# Patient Record
Sex: FEMALE | Race: WHITE | NOT HISPANIC OR LATINO | Employment: OTHER | URBAN - METROPOLITAN AREA
[De-identification: names, ages, dates, MRNs, and addresses within clinical notes are randomized per-mention and may not be internally consistent; named-entity substitution may affect disease eponyms.]

---

## 2023-03-08 ENCOUNTER — TELEPHONE (OUTPATIENT)
Dept: PRIMARY CARE | Facility: CLINIC | Age: 88
End: 2023-03-08
Payer: MEDICARE

## 2023-03-08 NOTE — TELEPHONE ENCOUNTER
Pt seen in urgent care, given Z-Rupert. Had reaction, covered in hives. Asking for you to send in substitute.     Pharmacy: [facility]

## 2023-03-09 DIAGNOSIS — T78.40XA ALLERGIC REACTION, INITIAL ENCOUNTER: Primary | ICD-10-CM

## 2023-03-09 RX ORDER — TRIAMCINOLONE ACETONIDE 1 MG/G
CREAM TOPICAL 2 TIMES DAILY
Qty: 15 G | Refills: 0 | Status: SHIPPED | OUTPATIENT
Start: 2023-03-09 | End: 2023-10-06 | Stop reason: ALTCHOICE

## 2023-03-09 RX ORDER — PREDNISONE 20 MG/1
TABLET ORAL
Qty: 18 TABLET | Refills: 0 | Status: SHIPPED | OUTPATIENT
Start: 2023-03-09 | End: 2023-03-18

## 2023-03-09 NOTE — TELEPHONE ENCOUNTER
Pt had allergic reaction, it's gotten much worse. Rash moved to head, ears, all over. Benadryl is not helping. Asking for steroid and cream.

## 2023-03-25 DIAGNOSIS — K21.9 GASTROESOPHAGEAL REFLUX DISEASE WITHOUT ESOPHAGITIS: Primary | ICD-10-CM

## 2023-03-27 RX ORDER — OMEPRAZOLE 20 MG/1
CAPSULE, DELAYED RELEASE ORAL
Qty: 90 CAPSULE | Refills: 0 | Status: SHIPPED | OUTPATIENT
Start: 2023-03-27 | End: 2023-06-27

## 2023-04-07 ENCOUNTER — OFFICE VISIT (OUTPATIENT)
Dept: PRIMARY CARE | Facility: CLINIC | Age: 88
End: 2023-04-07
Payer: MEDICARE

## 2023-04-07 VITALS
BODY MASS INDEX: 22.66 KG/M2 | SYSTOLIC BLOOD PRESSURE: 112 MMHG | HEART RATE: 51 BPM | HEIGHT: 65 IN | WEIGHT: 136 LBS | RESPIRATION RATE: 17 BRPM | TEMPERATURE: 97.3 F | DIASTOLIC BLOOD PRESSURE: 70 MMHG | OXYGEN SATURATION: 99 %

## 2023-04-07 DIAGNOSIS — Z00.00 MEDICARE ANNUAL WELLNESS VISIT, SUBSEQUENT: ICD-10-CM

## 2023-04-07 DIAGNOSIS — R19.7 DIARRHEA OF PRESUMED INFECTIOUS ORIGIN: Primary | ICD-10-CM

## 2023-04-07 PROBLEM — I10 BENIGN ESSENTIAL HYPERTENSION: Status: ACTIVE | Noted: 2023-04-07

## 2023-04-07 PROBLEM — R41.3 MEMORY LOSS: Status: ACTIVE | Noted: 2023-04-07

## 2023-04-07 PROBLEM — F41.1 ANXIETY STATE: Status: ACTIVE | Noted: 2023-04-07

## 2023-04-07 PROBLEM — N39.41 SENSORY URGE INCONTINENCE: Status: ACTIVE | Noted: 2023-04-07

## 2023-04-07 PROBLEM — E78.5 HYPERLIPIDEMIA, UNSPECIFIED: Status: ACTIVE | Noted: 2023-04-07

## 2023-04-07 PROBLEM — K21.9 GASTROESOPHAGEAL REFLUX DISEASE WITHOUT ESOPHAGITIS: Status: ACTIVE | Noted: 2023-04-07

## 2023-04-07 PROBLEM — E03.9 HYPOTHYROIDISM: Status: ACTIVE | Noted: 2023-04-07

## 2023-04-07 PROBLEM — M81.0 OSTEOPOROSIS: Status: ACTIVE | Noted: 2023-04-07

## 2023-04-07 PROCEDURE — 1170F FXNL STATUS ASSESSED: CPT | Performed by: FAMILY MEDICINE

## 2023-04-07 PROCEDURE — 99213 OFFICE O/P EST LOW 20 MIN: CPT | Performed by: FAMILY MEDICINE

## 2023-04-07 PROCEDURE — 3078F DIAST BP <80 MM HG: CPT | Performed by: FAMILY MEDICINE

## 2023-04-07 PROCEDURE — 1160F RVW MEDS BY RX/DR IN RCRD: CPT | Performed by: FAMILY MEDICINE

## 2023-04-07 PROCEDURE — 1036F TOBACCO NON-USER: CPT | Performed by: FAMILY MEDICINE

## 2023-04-07 PROCEDURE — 1159F MED LIST DOCD IN RCRD: CPT | Performed by: FAMILY MEDICINE

## 2023-04-07 PROCEDURE — G0439 PPPS, SUBSEQ VISIT: HCPCS | Performed by: FAMILY MEDICINE

## 2023-04-07 PROCEDURE — 3074F SYST BP LT 130 MM HG: CPT | Performed by: FAMILY MEDICINE

## 2023-04-07 RX ORDER — LEVOTHYROXINE SODIUM 75 UG/1
TABLET ORAL
COMMUNITY
Start: 2020-10-26 | End: 2023-04-17

## 2023-04-07 RX ORDER — LORAZEPAM 0.5 MG/1
0.5 TABLET ORAL EVERY 6 HOURS PRN
COMMUNITY
End: 2023-10-06 | Stop reason: ALTCHOICE

## 2023-04-07 RX ORDER — BIOTIN 1 MG
1 TABLET ORAL DAILY
COMMUNITY
End: 2024-04-08 | Stop reason: WASHOUT

## 2023-04-07 RX ORDER — ACETAMINOPHEN 500 MG
TABLET ORAL
COMMUNITY

## 2023-04-07 RX ORDER — DOCUSATE SODIUM 100 MG/1
1 CAPSULE, LIQUID FILLED ORAL NIGHTLY
COMMUNITY
Start: 2021-06-08

## 2023-04-07 RX ORDER — DONEPEZIL HYDROCHLORIDE 10 MG/1
TABLET, FILM COATED ORAL
COMMUNITY
End: 2023-06-07

## 2023-04-07 RX ORDER — ACETAMINOPHEN 500 MG
TABLET ORAL
COMMUNITY
Start: 2022-05-03

## 2023-04-07 RX ORDER — AMLODIPINE BESYLATE 5 MG/1
TABLET ORAL
COMMUNITY
Start: 2016-06-20 | End: 2023-07-28

## 2023-04-07 RX ORDER — SIMVASTATIN 20 MG/1
TABLET, FILM COATED ORAL
COMMUNITY
Start: 2021-05-10 | End: 2023-12-15 | Stop reason: SDUPTHER

## 2023-04-07 RX ORDER — CITALOPRAM 40 MG/1
1 TABLET, FILM COATED ORAL DAILY
COMMUNITY
Start: 2017-08-21 | End: 2023-06-07

## 2023-04-07 RX ORDER — ACETAMINOPHEN 500 MG
1 TABLET ORAL DAILY
COMMUNITY

## 2023-04-07 RX ORDER — METOPROLOL SUCCINATE 50 MG/1
TABLET, EXTENDED RELEASE ORAL
COMMUNITY
Start: 2020-10-26 | End: 2023-05-23

## 2023-04-07 RX ORDER — ASPIRIN 81 MG/1
TABLET ORAL
COMMUNITY

## 2023-04-07 ASSESSMENT — ACTIVITIES OF DAILY LIVING (ADL)
TAKING_MEDICATION: NEEDS ASSISTANCE
DRESSING: INDEPENDENT
GROCERY_SHOPPING: INDEPENDENT
MANAGING_FINANCES: NEEDS ASSISTANCE
DOING_HOUSEWORK: INDEPENDENT
BATHING: INDEPENDENT

## 2023-04-07 ASSESSMENT — ENCOUNTER SYMPTOMS
LOSS OF SENSATION IN FEET: 0
CONSTIPATION: 1
DIARRHEA: 1
CONFUSION: 1
OCCASIONAL FEELINGS OF UNSTEADINESS: 0
DEPRESSION: 0
ROS GI COMMENTS: INCREASED BELCHING

## 2023-04-07 ASSESSMENT — PATIENT HEALTH QUESTIONNAIRE - PHQ9
1. LITTLE INTEREST OR PLEASURE IN DOING THINGS: NOT AT ALL
2. FEELING DOWN, DEPRESSED OR HOPELESS: NOT AT ALL
SUM OF ALL RESPONSES TO PHQ9 QUESTIONS 1 AND 2: 0

## 2023-04-07 NOTE — PROGRESS NOTES
"87 year old female for follow up.     Reason for Visit: Gabrielle Turner is an 87 y.o. female here for a Medicare Wellness visit.     Reviewed all medications by prescribing practitioner or clinical pharmacist (such as prescriptions, OTCs, herbal therapies and supplements) and documented in the medical record.    Diarrhea        Subjective : she is having stomach issues. She has been living with Lashawn, and she has sporadic diarrhea. If is very explosive, and mostly when eating out. They were when she had sandwiches, ham and cheese sergey and monte s[employer]. Salads, Bag lettuce. Constipated for the most part. Black stools, fills the toilet. Not really cramping, no nausea, vomiting. Has tried pepto a few times. Burping a lot more.     Patient Care Team:  Allyn Solis MD as PCP - General  Allyn Solis MD as PCP - [employer] PCP     Review of Systems   Gastrointestinal:  Positive for constipation and diarrhea.        Increased belching   Psychiatric/Behavioral:  Positive for confusion.        Objective   Vitals:  /70   Pulse 51   Temp 36.3 °C (97.3 °F)   Resp 17   Ht 1.651 m (5' 5\")   Wt 61.7 kg (136 lb)   SpO2 99%   BMI 22.63 kg/m²       Physical Exam  Vitals reviewed.   Constitutional:       Appearance: Normal appearance.   HENT:      Head: Normocephalic and atraumatic.      Right Ear: Tympanic membrane normal.      Left Ear: Tympanic membrane normal.      Nose: Nose normal.      Mouth/Throat:      Mouth: Mucous membranes are moist.      Pharynx: Oropharynx is clear.   Eyes:      General: Scleral icterus: stool.      Extraocular Movements: Extraocular movements intact.      Conjunctiva/sclera: Conjunctivae normal.      Pupils: Pupils are equal, round, and reactive to light.   Cardiovascular:      Rate and Rhythm: Normal rate and regular rhythm.      Pulses: Normal pulses.      Heart sounds: Normal heart sounds.   Pulmonary:      Effort: Pulmonary effort is normal.      Breath " sounds: Normal breath sounds.   Abdominal:      General: Abdomen is flat. Bowel sounds are normal.      Palpations: Abdomen is soft.   Musculoskeletal:         General: Normal range of motion.      Cervical back: Normal range of motion and neck supple.   Skin:     General: Skin is warm and dry.      Capillary Refill: Capillary refill takes less than 2 seconds.   Neurological:      General: No focal deficit present.      Mental Status: She is alert and oriented to person, place, and time.   Psychiatric:         Mood and Affect: Mood normal.         Behavior: Behavior normal.         Assessment/Plan   Problem List Items Addressed This Visit       Medicare annual wellness visit, subsequent    Diarrhea of presumed infectious origin - Primary    Relevant Orders    Stool Pathogen Panel, PCR    Calprotectin Stool    Giardia / Cryptosporidium antigens, DFA    C. difficile, PCR    Fecal Occult Blood Immunoassy    Lactoferrin, Fecal, Quantitative     Plan to increase fiber, Track diarrhea, and get stool cultures and check for blood. If cultures negative, would think about checking gall bladder or considering irritable bowel.

## 2023-04-13 ENCOUNTER — LAB (OUTPATIENT)
Dept: LAB | Facility: LAB | Age: 88
End: 2023-04-13
Payer: MEDICARE

## 2023-04-13 DIAGNOSIS — R19.7 DIARRHEA OF PRESUMED INFECTIOUS ORIGIN: ICD-10-CM

## 2023-04-13 LAB
C. DIFFICILE TOXIN, PCR: NORMAL
CALPROTECTIN, STOOL: NORMAL
CAMPYLOBACTER GP: NORMAL
CLOSTRIDIUM DIFFICILE NAP 1 STRAIN (PRESUMPTIVE): NORMAL
CRYPTOSPORIDIUM ANTIGEN-DATA CONVERSION: NORMAL
GIARDIA LAMBLIA AG-DATA CONVERSION: NORMAL
LACTOFERRIN, FECAL, QUANT.: POSITIVE
NOROVIRUS GI/GII: NORMAL
ROTAVIRUS A: NORMAL
SALMONELLA SP.: NORMAL
SHIGA TOXIN 1: NORMAL
SHIGA TOXIN 2: NORMAL
SHIGELLA SP.: NORMAL
VIBRIO GRP.: NORMAL
YERSINIA ENTEROCOLITICA: NORMAL

## 2023-04-13 PROCEDURE — 83630 LACTOFERRIN FECAL (QUAL): CPT

## 2023-04-14 ENCOUNTER — TELEPHONE (OUTPATIENT)
Dept: PRIMARY CARE | Facility: CLINIC | Age: 88
End: 2023-04-14
Payer: MEDICARE

## 2023-04-14 DIAGNOSIS — R19.7 DIARRHEA OF PRESUMED INFECTIOUS ORIGIN: Primary | ICD-10-CM

## 2023-04-14 NOTE — TELEPHONE ENCOUNTER
Lab called, all stool studies were canceled to due to Q&S and the cdiff was canceled due to formed stool.

## 2023-04-18 ENCOUNTER — APPOINTMENT (OUTPATIENT)
Dept: PRIMARY CARE | Facility: CLINIC | Age: 88
End: 2023-04-18
Payer: MEDICARE

## 2023-04-20 LAB — FECAL OCCULT BLD IMMUNOASSAY: NEGATIVE

## 2023-04-26 NOTE — TELEPHONE ENCOUNTER
Daughter called stating patient is still having diarrhea, I explained that the stool cultures got canceled because the stool received by the lab was formed and not diarrhea. Since she is still having symptoms recommended her doing the stool cultures again, explained to her that it has to be diarrhea in order for them to test. Re ordered, daughter will drop off at lab.

## 2023-05-01 ENCOUNTER — LAB (OUTPATIENT)
Dept: LAB | Facility: LAB | Age: 88
End: 2023-05-01
Payer: MEDICARE

## 2023-05-01 DIAGNOSIS — R19.7 DIARRHEA OF PRESUMED INFECTIOUS ORIGIN: ICD-10-CM

## 2023-05-01 LAB — LACTOFERRIN, FECAL, QUANT.: NEGATIVE

## 2023-05-01 PROCEDURE — 83630 LACTOFERRIN FECAL (QUAL): CPT

## 2023-05-01 PROCEDURE — 87177 OVA AND PARASITES SMEARS: CPT

## 2023-05-01 PROCEDURE — 87328 CRYPTOSPORIDIUM AG IA: CPT

## 2023-05-01 PROCEDURE — 87329 GIARDIA AG IA: CPT

## 2023-05-01 PROCEDURE — 36415 COLL VENOUS BLD VENIPUNCTURE: CPT

## 2023-05-01 PROCEDURE — 87506 IADNA-DNA/RNA PROBE TQ 6-11: CPT

## 2023-05-01 PROCEDURE — 87209 SMEAR COMPLEX STAIN: CPT

## 2023-05-01 PROCEDURE — 83993 ASSAY FOR CALPROTECTIN FECAL: CPT

## 2023-05-02 LAB
CAMPYLOBACTER GP: NOT DETECTED
NOROVIRUS GI/GII: NOT DETECTED
ROTAVIRUS A: NOT DETECTED
SALMONELLA SP.: NOT DETECTED
SHIGA TOXIN 1: NOT DETECTED
SHIGA TOXIN 2: NOT DETECTED
SHIGELLA SP.: NOT DETECTED
VIBRIO GRP.: NOT DETECTED
YERSINIA ENTEROCOLITICA: NOT DETECTED

## 2023-05-04 LAB — CALPROTECTIN, STOOL: 45 UG/G

## 2023-05-05 LAB
CRYPTOSPORIDIUM ANTIGEN-DATA CONVERSION: NEGATIVE
GIARDIA LAMBLIA AG-DATA CONVERSION: NEGATIVE
OVA + PARASITE EXAM: NEGATIVE

## 2023-05-16 ENCOUNTER — TELEPHONE (OUTPATIENT)
Dept: PRIMARY CARE | Facility: CLINIC | Age: 88
End: 2023-05-16
Payer: MEDICARE

## 2023-06-06 ENCOUNTER — TELEPHONE (OUTPATIENT)
Dept: PRIMARY CARE | Facility: CLINIC | Age: 88
End: 2023-06-06
Payer: MEDICARE

## 2023-06-06 DIAGNOSIS — M19.90 ARTHRITIS: Primary | ICD-10-CM

## 2023-06-06 PROBLEM — R39.9 UTI SYMPTOMS: Status: ACTIVE | Noted: 2023-06-06

## 2023-06-06 PROBLEM — J30.9 ALLERGIC RHINITIS: Status: ACTIVE | Noted: 2023-06-06

## 2023-06-06 PROBLEM — K59.09 CHRONIC CONSTIPATION: Status: ACTIVE | Noted: 2023-06-06

## 2023-06-06 PROBLEM — L08.9 POSTTRAUMATIC WOUND INFECTION: Status: ACTIVE | Noted: 2023-06-06

## 2023-06-06 PROBLEM — R35.0 URINE FREQUENCY: Status: ACTIVE | Noted: 2023-06-06

## 2023-06-06 PROBLEM — T14.8XXA POSTTRAUMATIC WOUND INFECTION: Status: ACTIVE | Noted: 2023-06-06

## 2023-06-06 PROBLEM — R30.0 DYSURIA: Status: ACTIVE | Noted: 2023-06-06

## 2023-06-06 PROBLEM — R32 INCONTINENCE: Status: ACTIVE | Noted: 2023-06-06

## 2023-06-06 PROBLEM — H81.10 BENIGN POSITIONAL VERTIGO: Status: ACTIVE | Noted: 2023-06-06

## 2023-06-06 PROBLEM — S61.451A DOG BITE OF RIGHT HAND: Status: ACTIVE | Noted: 2023-06-06

## 2023-06-06 PROBLEM — K57.30 DIVERTICULOSIS OF LARGE INTESTINE WITHOUT HEMORRHAGE: Status: ACTIVE | Noted: 2023-06-06

## 2023-06-06 PROBLEM — C44.320 SQUAMOUS CELL CARCINOMA OF SKIN OF FACE: Status: ACTIVE | Noted: 2023-06-06

## 2023-06-06 PROBLEM — T14.8XXA: Status: ACTIVE | Noted: 2023-06-06
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
## 2023-06-06 PROBLEM — S42.209A CLOSED FRACTURE OF PROXIMAL END OF HUMERUS: Status: ACTIVE | Noted: 2023-06-06

## 2023-06-06 RX ORDER — MELOXICAM 15 MG/1
15 TABLET ORAL DAILY
Qty: 30 TABLET | Refills: 0 | Status: SHIPPED | OUTPATIENT
Start: 2023-06-06 | End: 2024-04-08 | Stop reason: WASHOUT

## 2023-06-06 NOTE — TELEPHONE ENCOUNTER
Pt having knee pain, made soonest avail appt next week. Asking for refill of Meloxicam    [facility]

## 2023-06-07 DIAGNOSIS — F41.1 ANXIETY STATE: Primary | ICD-10-CM

## 2023-06-07 RX ORDER — CITALOPRAM 40 MG/1
TABLET, FILM COATED ORAL
Qty: 90 TABLET | Refills: 0 | Status: SHIPPED | OUTPATIENT
Start: 2023-06-07 | End: 2023-08-28

## 2023-06-15 ENCOUNTER — OFFICE VISIT (OUTPATIENT)
Dept: PRIMARY CARE | Facility: CLINIC | Age: 88
End: 2023-06-15
Payer: MEDICARE

## 2023-06-15 VITALS
DIASTOLIC BLOOD PRESSURE: 82 MMHG | HEART RATE: 78 BPM | WEIGHT: 136.8 LBS | SYSTOLIC BLOOD PRESSURE: 134 MMHG | BODY MASS INDEX: 22.76 KG/M2

## 2023-06-15 DIAGNOSIS — M25.562 CHRONIC PAIN OF BOTH KNEES: Primary | ICD-10-CM

## 2023-06-15 DIAGNOSIS — M17.11 PRIMARY LOCALIZED OSTEOARTHROSIS OF THE KNEE, RIGHT: ICD-10-CM

## 2023-06-15 DIAGNOSIS — G89.29 CHRONIC PAIN OF BOTH KNEES: Primary | ICD-10-CM

## 2023-06-15 DIAGNOSIS — M25.561 CHRONIC PAIN OF BOTH KNEES: Primary | ICD-10-CM

## 2023-06-15 DIAGNOSIS — M17.12 PRIMARY OSTEOARTHRITIS OF LEFT KNEE: ICD-10-CM

## 2023-06-15 PROCEDURE — 1036F TOBACCO NON-USER: CPT | Performed by: FAMILY MEDICINE

## 2023-06-15 PROCEDURE — 1160F RVW MEDS BY RX/DR IN RCRD: CPT | Performed by: FAMILY MEDICINE

## 2023-06-15 PROCEDURE — 1159F MED LIST DOCD IN RCRD: CPT | Performed by: FAMILY MEDICINE

## 2023-06-15 PROCEDURE — 20610 DRAIN/INJ JOINT/BURSA W/O US: CPT | Performed by: FAMILY MEDICINE

## 2023-06-15 PROCEDURE — 3075F SYST BP GE 130 - 139MM HG: CPT | Performed by: FAMILY MEDICINE

## 2023-06-15 PROCEDURE — 99213 OFFICE O/P EST LOW 20 MIN: CPT | Performed by: FAMILY MEDICINE

## 2023-06-15 PROCEDURE — 3079F DIAST BP 80-89 MM HG: CPT | Performed by: FAMILY MEDICINE

## 2023-06-15 RX ORDER — TRIAMCINOLONE ACETONIDE 40 MG/ML
40 INJECTION, SUSPENSION INTRA-ARTICULAR; INTRAMUSCULAR
Status: COMPLETED | OUTPATIENT
Start: 2023-06-15 | End: 2023-06-15

## 2023-06-15 RX ORDER — LIDOCAINE HYDROCHLORIDE 20 MG/ML
1 INJECTION, SOLUTION INFILTRATION; PERINEURAL ONCE
Status: COMPLETED | OUTPATIENT
Start: 2023-06-15 | End: 2023-06-15

## 2023-06-15 RX ADMIN — TRIAMCINOLONE ACETONIDE 40 MG: 40 INJECTION, SUSPENSION INTRA-ARTICULAR; INTRAMUSCULAR at 14:58

## 2023-06-15 RX ADMIN — LIDOCAINE HYDROCHLORIDE 20 MG: 20 INJECTION, SOLUTION INFILTRATION; PERINEURAL at 15:56

## 2023-06-15 RX ADMIN — LIDOCAINE HYDROCHLORIDE 20 MG: 20 INJECTION, SOLUTION INFILTRATION; PERINEURAL at 15:59

## 2023-06-15 ASSESSMENT — ENCOUNTER SYMPTOMS
ENDOCRINE NEGATIVE: 1
CONSTITUTIONAL NEGATIVE: 1
CARDIOVASCULAR NEGATIVE: 1
ALLERGIC/IMMUNOLOGIC NEGATIVE: 1
ARTHRALGIAS: 1
PSYCHIATRIC NEGATIVE: 1
HEMATOLOGIC/LYMPHATIC NEGATIVE: 1
NEUROLOGICAL NEGATIVE: 1
EYES NEGATIVE: 1
RESPIRATORY NEGATIVE: 1
GASTROINTESTINAL NEGATIVE: 1
MYALGIAS: 1

## 2023-06-15 NOTE — PROGRESS NOTES
Patient ID: Gabrielle Turner is a 88 y.o. female.    Joint Injection Large/Arthrocentesis: bilateral knee on 6/15/2023 2:58 PM  Indications: pain  Details: 22 G needle, medial approach  Medications (Right): 40 mg triamcinolone acetonide 40 mg/mL  Medications (Left): 40 mg triamcinolone acetonide 40 mg/mL  Procedure, treatment alternatives, risks and benefits explained, specific risks discussed. Consent was given by the patient. Immediately prior to procedure a time out was called to verify the correct patient, procedure, equipment, support staff and site/side marked as required. Patient was prepped and draped in the usual sterile fashion.     Subjective   Patient ID: Gabrielle Turner is a 88 y.o. female who presents for Knee Pain (Cortisone injections).    Patient presents for cortisone injections in her knees that has been going on for a few years. She says that the meloxicam that was sent in has helped some. She has no other complaints at this time. She does have a history of falling and hurting her left shoulder, and she saw Dr. Del Valle who gave her cortisone injections in both knees and used comfort measures for her left shoulder. She did not know exactly what medications she needed refills on, so she was instructed to call the office for any refills.  Ladonna Hood P student acting as scribe for Glendy Nolasco CNP          Review of Systems   Constitutional: Negative.    HENT: Negative.     Eyes: Negative.    Respiratory: Negative.     Cardiovascular: Negative.    Gastrointestinal: Negative.    Endocrine: Negative.    Genitourinary: Negative.    Musculoskeletal:  Positive for arthralgias and myalgias.        Bilateral knee pain   Skin: Negative.    Allergic/Immunologic: Negative.    Neurological: Negative.    Hematological: Negative.    Psychiatric/Behavioral: Negative.         Objective   /82   Pulse 78   Wt 62.1 kg (136 lb 12.8 oz)   BMI 22.76 kg/m²     Physical Exam  Constitutional:        Appearance: Normal appearance.   HENT:      Head: Normocephalic and atraumatic.      Nose: Nose normal.      Mouth/Throat:      Mouth: Mucous membranes are moist.   Cardiovascular:      Rate and Rhythm: Normal rate and regular rhythm.      Pulses: Normal pulses.      Heart sounds: Normal heart sounds.   Pulmonary:      Effort: Pulmonary effort is normal.      Breath sounds: Normal breath sounds.   Musculoskeletal:         General: Tenderness present.      Cervical back: Normal range of motion and neck supple.      Comments: Bilateral knee   Skin:     General: Skin is warm and dry.      Capillary Refill: Capillary refill takes less than 2 seconds.   Neurological:      General: No focal deficit present.      Mental Status: She is alert and oriented to person, place, and time.   Psychiatric:         Mood and Affect: Mood normal.         Behavior: Behavior normal.         Thought Content: Thought content normal.         Judgment: Judgment normal.       Assessment/Plan   Problem List Items Addressed This Visit    None  Visit Diagnoses       Chronic pain of both knees    -  Primary    Relevant Medications    lidocaine (Xylocaine) 20 mg/mL (2 %) injection 20 mg (Completed)    lidocaine (Xylocaine) 20 mg/mL (2 %) injection 20 mg (Completed)    Other Relevant Orders    Large Joint Injection/Arthrocentesis    Large Joint Injection/Arthrocentesis    Primary osteoarthritis of left knee        Relevant Medications    lidocaine (Xylocaine) 20 mg/mL (2 %) injection 20 mg (Completed)

## 2023-08-24 ENCOUNTER — OFFICE VISIT (OUTPATIENT)
Dept: PRIMARY CARE | Facility: CLINIC | Age: 88
End: 2023-08-24
Payer: MEDICARE

## 2023-08-24 DIAGNOSIS — S05.32XD: Primary | ICD-10-CM

## 2023-08-24 DIAGNOSIS — Z48.02 VISIT FOR SUTURE REMOVAL: ICD-10-CM

## 2023-08-24 PROCEDURE — 15853 REMOVAL SUTR/STAPL XREQ ANES: CPT | Performed by: FAMILY MEDICINE

## 2023-08-24 PROCEDURE — 99212 OFFICE O/P EST SF 10 MIN: CPT | Performed by: FAMILY MEDICINE

## 2023-08-24 PROCEDURE — 1160F RVW MEDS BY RX/DR IN RCRD: CPT | Performed by: FAMILY MEDICINE

## 2023-08-24 PROCEDURE — 1159F MED LIST DOCD IN RCRD: CPT | Performed by: FAMILY MEDICINE

## 2023-08-24 PROCEDURE — 1036F TOBACCO NON-USER: CPT | Performed by: FAMILY MEDICINE

## 2023-08-24 NOTE — PROGRESS NOTES
Subjective   Patient ID: Gabrielle Turner is a 88 y.o. female who presents for No chief complaint on file..    Presents for suture removal. Was seen in ER 8/14 after sustaining a laceration to left eye requiring the placement of 11 sutures. Denies headaches, vision changes, sleeping difficulties. Is healing well and without s/s infection.          Review of Systems   All other systems reviewed and are negative.      Objective   There were no vitals taken for this visit.    Physical Exam  Constitutional:       Appearance: Normal appearance.   Eyes:      Pupils: Pupils are equal, round, and reactive to light.   Skin:     Comments: Laceration to left upper eyelid closed with 11 sutures, wound bed well approximated with some scabbing along laceration, no s/s infection   Neurological:      Mental Status: She is alert.     Patient ID: Gabrielle Turner is a 88 y.o. female.    Suture Removal    Date/Time: 8/24/2023 3:30 PM    Performed by: GRZEGORZ Maciel  Authorized by: GRZEGORZ Maciel    Consent:     Consent obtained:  Verbal    Consent given by:  Patient    Risks, benefits, and alternatives were discussed: yes      Risks discussed:  Bleeding, pain and wound separation  Location:     Location: left upper eye lid.  Procedure details:     Wound appearance:  No signs of infection and nontender    Number of sutures removed:  11  Post-procedure details:     Post-removal:  Antibiotic ointment applied    Procedure completion:  Tolerated well, no immediate complications      Assessment/Plan   Problem List Items Addressed This Visit    None  Visit Diagnoses       Laceration of left eye, subsequent encounter    -  Primary    Relevant Orders    Suture removal    Visit for suture removal

## 2023-10-06 ENCOUNTER — OFFICE VISIT (OUTPATIENT)
Dept: PRIMARY CARE | Facility: CLINIC | Age: 88
End: 2023-10-06
Payer: MEDICARE

## 2023-10-06 VITALS
OXYGEN SATURATION: 97 % | SYSTOLIC BLOOD PRESSURE: 120 MMHG | TEMPERATURE: 97.6 F | HEIGHT: 65 IN | WEIGHT: 130 LBS | BODY MASS INDEX: 21.66 KG/M2 | HEART RATE: 64 BPM | DIASTOLIC BLOOD PRESSURE: 80 MMHG

## 2023-10-06 DIAGNOSIS — I10 BENIGN ESSENTIAL HYPERTENSION: ICD-10-CM

## 2023-10-06 DIAGNOSIS — E03.9 ACQUIRED HYPOTHYROIDISM: ICD-10-CM

## 2023-10-06 DIAGNOSIS — E78.2 MIXED HYPERLIPIDEMIA: ICD-10-CM

## 2023-10-06 DIAGNOSIS — G30.1 MILD LATE ONSET ALZHEIMER'S DEMENTIA WITHOUT BEHAVIORAL DISTURBANCE, PSYCHOTIC DISTURBANCE, MOOD DISTURBANCE, OR ANXIETY (MULTI): Primary | ICD-10-CM

## 2023-10-06 DIAGNOSIS — F02.A0 MILD LATE ONSET ALZHEIMER'S DEMENTIA WITHOUT BEHAVIORAL DISTURBANCE, PSYCHOTIC DISTURBANCE, MOOD DISTURBANCE, OR ANXIETY (MULTI): Primary | ICD-10-CM

## 2023-10-06 DIAGNOSIS — E03.9 ADULT HYPOTHYROIDISM: ICD-10-CM

## 2023-10-06 DIAGNOSIS — M19.90 ARTHRITIS: ICD-10-CM

## 2023-10-06 DIAGNOSIS — Z23 NEED FOR PROPHYLACTIC VACCINATION AND INOCULATION AGAINST INFLUENZA: ICD-10-CM

## 2023-10-06 DIAGNOSIS — R41.3 MEMORY LOSS: ICD-10-CM

## 2023-10-06 DIAGNOSIS — F41.1 ANXIETY STATE: ICD-10-CM

## 2023-10-06 PROCEDURE — 1170F FXNL STATUS ASSESSED: CPT | Performed by: FAMILY MEDICINE

## 2023-10-06 PROCEDURE — 99214 OFFICE O/P EST MOD 30 MIN: CPT | Performed by: FAMILY MEDICINE

## 2023-10-06 PROCEDURE — 1160F RVW MEDS BY RX/DR IN RCRD: CPT | Performed by: FAMILY MEDICINE

## 2023-10-06 PROCEDURE — 1159F MED LIST DOCD IN RCRD: CPT | Performed by: FAMILY MEDICINE

## 2023-10-06 PROCEDURE — 90686 IIV4 VACC NO PRSV 0.5 ML IM: CPT | Performed by: FAMILY MEDICINE

## 2023-10-06 PROCEDURE — 3079F DIAST BP 80-89 MM HG: CPT | Performed by: FAMILY MEDICINE

## 2023-10-06 PROCEDURE — 3074F SYST BP LT 130 MM HG: CPT | Performed by: FAMILY MEDICINE

## 2023-10-06 PROCEDURE — G0008 ADMIN INFLUENZA VIRUS VAC: HCPCS | Performed by: FAMILY MEDICINE

## 2023-10-06 PROCEDURE — 1036F TOBACCO NON-USER: CPT | Performed by: FAMILY MEDICINE

## 2023-10-06 RX ORDER — AMLODIPINE BESYLATE 5 MG/1
5 TABLET ORAL DAILY
Qty: 90 TABLET | Refills: 3 | Status: SHIPPED | OUTPATIENT
Start: 2023-10-06

## 2023-10-06 RX ORDER — LEVOTHYROXINE SODIUM 75 UG/1
75 TABLET ORAL DAILY
Qty: 90 TABLET | Refills: 3 | Status: SHIPPED | OUTPATIENT
Start: 2023-10-06 | End: 2024-10-05

## 2023-10-06 RX ORDER — MEMANTINE HYDROCHLORIDE 10 MG/1
10 TABLET ORAL 2 TIMES DAILY
Qty: 60 TABLET | Refills: 5 | Status: SHIPPED | OUTPATIENT
Start: 2023-10-06 | End: 2024-04-08 | Stop reason: WASHOUT

## 2023-10-06 RX ORDER — CITALOPRAM 40 MG/1
40 TABLET, FILM COATED ORAL DAILY
Qty: 90 TABLET | Refills: 3 | Status: SHIPPED | OUTPATIENT
Start: 2023-10-06

## 2023-10-06 ASSESSMENT — ACTIVITIES OF DAILY LIVING (ADL)
ADEQUATE_TO_COMPLETE_ADL: YES
MANAGING FINANCES: INDEPENDENT
BATHING: NEEDS ASSISTANCE
PILL BOX USED: YES
FEEDING YOURSELF: INDEPENDENT
TAKING MEDICATION: NEEDS ASSISTANCE
EATING: INDEPENDENT
JUDGMENT_ADEQUATE_SAFELY_COMPLETE_DAILY_ACTIVITIES: YES
DOING HOUSEWORK: NEEDS ASSISTANCE
USING TELEPHONE: INDEPENDENT
DRESSING YOURSELF: INDEPENDENT
STIL DRIVING: NO
GROOMING: INDEPENDENT
GROCERY SHOPPING: NEEDS ASSISTANCE
USING TRANSPORTATION: INDEPENDENT
PREPARING MEALS: NEEDS ASSISTANCE
PATIENT'S MEMORY ADEQUATE TO SAFELY COMPLETE DAILY ACTIVITIES?: YES
TOILETING: INDEPENDENT
HEARING - RIGHT EAR: FUNCTIONAL
HEARING - LEFT EAR: FUNCTIONAL
NEEDS ASSISTANCE WITH FOOD: INDEPENDENT
WALKS IN HOME: INDEPENDENT

## 2023-10-06 ASSESSMENT — COLUMBIA-SUICIDE SEVERITY RATING SCALE - C-SSRS
2. HAVE YOU ACTUALLY HAD ANY THOUGHTS OF KILLING YOURSELF?: NO
1. IN THE PAST MONTH, HAVE YOU WISHED YOU WERE DEAD OR WISHED YOU COULD GO TO SLEEP AND NOT WAKE UP?: NO
6. HAVE YOU EVER DONE ANYTHING, STARTED TO DO ANYTHING, OR PREPARED TO DO ANYTHING TO END YOUR LIFE?: NO

## 2023-10-06 ASSESSMENT — ENCOUNTER SYMPTOMS
CONFUSION: 1
LOSS OF SENSATION IN FEET: 0
DEPRESSION: 0
OCCASIONAL FEELINGS OF UNSTEADINESS: 1

## 2023-10-06 ASSESSMENT — LIFESTYLE VARIABLES
AUDIT-C TOTAL SCORE: 0
HOW OFTEN DO YOU HAVE SIX OR MORE DRINKS ON ONE OCCASION: NEVER
HOW MANY STANDARD DRINKS CONTAINING ALCOHOL DO YOU HAVE ON A TYPICAL DAY: PATIENT DOES NOT DRINK
HOW OFTEN DO YOU HAVE A DRINK CONTAINING ALCOHOL: NEVER
SKIP TO QUESTIONS 9-10: 1

## 2023-10-06 ASSESSMENT — PATIENT HEALTH QUESTIONNAIRE - PHQ9
SUM OF ALL RESPONSES TO PHQ9 QUESTIONS 1 AND 2: 0
1. LITTLE INTEREST OR PLEASURE IN DOING THINGS: NOT AT ALL
2. FEELING DOWN, DEPRESSED OR HOPELESS: NOT AT ALL

## 2023-10-06 NOTE — PROGRESS NOTES
Patient was identified as a fall risk. Risk prevention instructions provided.  Subjective   Patient ID: Gabrielle Turner is a 88 y.o. female.    HPI  88 year old female for six month follow up. Meds reconciled, and daughter questions if she needs less medication.   Review of Systems   Psychiatric/Behavioral:  Positive for confusion.         Memory, but for the most part she is happy.    All other systems reviewed and are negative.      Objective   Physical Exam  Vitals and nursing note reviewed.   Constitutional:       Appearance: Normal appearance.   HENT:      Head: Normocephalic and atraumatic.      Right Ear: Tympanic membrane normal.      Left Ear: Tympanic membrane normal.      Nose: Nose normal.      Mouth/Throat:      Mouth: Mucous membranes are moist.      Pharynx: Oropharynx is clear.   Eyes:      Extraocular Movements: Extraocular movements intact.      Conjunctiva/sclera: Conjunctivae normal.      Pupils: Pupils are equal, round, and reactive to light.   Cardiovascular:      Rate and Rhythm: Normal rate and regular rhythm.      Pulses: Normal pulses.      Heart sounds: Normal heart sounds.   Pulmonary:      Effort: Pulmonary effort is normal.      Breath sounds: Normal breath sounds.   Abdominal:      General: Abdomen is flat. Bowel sounds are normal.      Palpations: Abdomen is soft.   Musculoskeletal:         General: Normal range of motion.      Cervical back: Normal range of motion and neck supple.   Skin:     General: Skin is warm and dry.      Capillary Refill: Capillary refill takes less than 2 seconds.   Neurological:      General: No focal deficit present.      Mental Status: She is alert and oriented to person, place, and time.   Psychiatric:         Mood and Affect: Mood normal.         Behavior: Behavior normal.       Assessment/Plan   Diagnoses and all orders for this visit:  Mild late onset Alzheimer's dementia without behavioral disturbance, psychotic disturbance, mood disturbance, or  anxiety (CMS/HCC)  -     memantine (Namenda) 10 mg tablet; Take 1 tablet (10 mg) by mouth 2 times a day.  Take with aricept.   Need for prophylactic vaccination and inoculation against influenza  -     Flu vaccine (IIV4) age 6 months and greater, preservative free  Benign essential hypertension  -     amLODIPine (Norvasc) 5 mg tablet; Take 1 tablet (5 mg) by mouth once daily. as directed  Also taking Metoprolol, BP good.   Mixed hyperlipidemia- ? Simvastatin, recheck next visit.   Acquired hypothyroidism- levothyroxine  Anxiety state  -     citalopram (CeleXA) 40 mg tablet; Take 1 tablet (40 mg) by mouth once daily.  Arthritis- ? meloxicam  Memory loss- added above  Adult hypothyroidism  -     levothyroxine (Synthroid, Levoxyl) 75 mcg tablet; Take 1 tablet (75 mcg) by mouth once daily. as directed

## 2023-10-06 NOTE — PATIENT INSTRUCTIONS
Assessment/Plan   Diagnoses and all orders for this visit:  Mild late onset Alzheimer's dementia without behavioral disturbance, psychotic disturbance, mood disturbance, or anxiety (CMS/HCC)  -     memantine (Namenda) 10 mg tablet; Take 1 tablet (10 mg) by mouth 2 times a day.  Take with aricept.   Need for prophylactic vaccination and inoculation against influenza  -     Flu vaccine (IIV4) age 6 months and greater, preservative free  Benign essential hypertension  -     amLODIPine (Norvasc) 5 mg tablet; Take 1 tablet (5 mg) by mouth once daily. as directed  Also taking Metoprolol, BP good.   Mixed hyperlipidemia- ? Simvastatin, recheck next visit.   Acquired hypothyroidism- levothyroxine  Anxiety state  -     citalopram (CeleXA) 40 mg tablet; Take 1 tablet (40 mg) by mouth once daily.  Arthritis- ? meloxicam  Memory loss- added above  Adult hypothyroidism  -     levothyroxine (Synthroid, Levoxyl) 75 mcg tablet; Take 1 tablet (75 mcg) by mouth once daily. as directed      Ways to Help Prevent Falls at Home    Quick Tips   ? Ask for help if you need it. Most people want to help!   ? Get up slowly after sitting or laying down   ? Wear a medical alert device or keep cell phone in your pocket   ? Use night lights, especially areas near a bathroom   ? Keep the items you use often within reach on a small stool or end table   ? Use an assistive device such as walker or cane, as directed by provider/physical therapy   ? Use a non-slip mat and grab bars in your bathroom. Look for home health sections for best options     Other Areas to Focus On   ? Exercise and nutrition: Regular exercise or taking a falls prevention class are great ways improve strength and balance. Don’t forget to stay hydrated and bring a snack!   ? Medicine side effects: Some medicines can make you sleepy or dizzy, which could cause a fall. Ask your healthcare provider about the side effects your medicines could cause. Be sure to let them know if you  take any vitamins or supplements as well.   ? Tripping hazards: Remove items you could trip on, such as loose mats, rugs, cords, and clutter. Wear closed toe shoes with rubber soles.   ? Health and wellness: Get regular checkups with your healthcare provider, plus routine vision and hearing screenings. Talk with your healthcare provider about:   o Your medicines and the possible side effects - bring them in a bag if that is easier!   o Problems with balance or feeling dizzy   o Ways to promote bone health, such as Vitamin D and calcium supplements   o Questions or concerns about falling     *Ask your healthcare team if you have questions     ©[facility], 2022

## 2023-10-30 DIAGNOSIS — R41.3 MEMORY LOSS: ICD-10-CM

## 2023-10-30 RX ORDER — DONEPEZIL HYDROCHLORIDE 10 MG/1
TABLET, FILM COATED ORAL
Qty: 30 TABLET | Refills: 0 | Status: SHIPPED | OUTPATIENT
Start: 2023-10-30 | End: 2023-12-04

## 2023-12-15 DIAGNOSIS — E78.5 HYPERLIPIDEMIA, UNSPECIFIED HYPERLIPIDEMIA TYPE: ICD-10-CM

## 2023-12-18 RX ORDER — SIMVASTATIN 20 MG/1
20 TABLET, FILM COATED ORAL DAILY
Qty: 90 TABLET | Refills: 3 | Status: SHIPPED | OUTPATIENT
Start: 2023-12-18

## 2024-01-29 ENCOUNTER — TELEPHONE (OUTPATIENT)
Dept: PRIMARY CARE | Facility: CLINIC | Age: 89
End: 2024-01-29
Payer: MEDICARE

## 2024-01-29 NOTE — TELEPHONE ENCOUNTER
donepezil (Aricept) 10 mg tablet [[id number]]    Patients daughter said memory is getting worse and doesn't seem to be helping asking if the dosage was changed or if there is a stronger alternative. Please advise

## 2024-01-31 NOTE — TELEPHONE ENCOUNTER
Patients daughter said they never received the memantine (Namenda) 10 mg tablet [[id number]] that was prescribed the 10/06/23 appointment calling the pharmacy now

## 2024-01-31 NOTE — TELEPHONE ENCOUNTER
Spoke to Lashawn she never picked up Rx from [facility]. She will  today and start, will call if any issues.

## 2024-04-08 ENCOUNTER — OFFICE VISIT (OUTPATIENT)
Dept: PRIMARY CARE | Facility: CLINIC | Age: 89
End: 2024-04-08
Payer: MEDICARE

## 2024-04-08 VITALS
BODY MASS INDEX: 21.26 KG/M2 | HEIGHT: 65 IN | SYSTOLIC BLOOD PRESSURE: 122 MMHG | WEIGHT: 127.6 LBS | OXYGEN SATURATION: 95 % | HEART RATE: 63 BPM | RESPIRATION RATE: 16 BRPM | DIASTOLIC BLOOD PRESSURE: 80 MMHG

## 2024-04-08 DIAGNOSIS — K59.09 CHRONIC CONSTIPATION: ICD-10-CM

## 2024-04-08 DIAGNOSIS — I10 BENIGN ESSENTIAL HYPERTENSION: ICD-10-CM

## 2024-04-08 DIAGNOSIS — N39.41 SENSORY URGE INCONTINENCE: ICD-10-CM

## 2024-04-08 DIAGNOSIS — F02.A0 MILD LATE ONSET ALZHEIMER'S DEMENTIA WITHOUT BEHAVIORAL DISTURBANCE, PSYCHOTIC DISTURBANCE, MOOD DISTURBANCE, OR ANXIETY (MULTI): ICD-10-CM

## 2024-04-08 DIAGNOSIS — K21.9 GASTROESOPHAGEAL REFLUX DISEASE WITHOUT ESOPHAGITIS: ICD-10-CM

## 2024-04-08 DIAGNOSIS — I73.9 PERIPHERAL VASCULAR DISEASE, UNSPECIFIED (CMS-HCC): ICD-10-CM

## 2024-04-08 DIAGNOSIS — G30.1 MILD LATE ONSET ALZHEIMER'S DEMENTIA WITHOUT BEHAVIORAL DISTURBANCE, PSYCHOTIC DISTURBANCE, MOOD DISTURBANCE, OR ANXIETY (MULTI): ICD-10-CM

## 2024-04-08 DIAGNOSIS — E03.9 ADULT HYPOTHYROIDISM: ICD-10-CM

## 2024-04-08 DIAGNOSIS — M81.0 OSTEOPOROSIS, UNSPECIFIED OSTEOPOROSIS TYPE, UNSPECIFIED PATHOLOGICAL FRACTURE PRESENCE: ICD-10-CM

## 2024-04-08 DIAGNOSIS — H34.8112 CENTRAL RETINAL VEIN OCCLUSION, RIGHT EYE, STABLE (CMS-HCC): ICD-10-CM

## 2024-04-08 DIAGNOSIS — K59.1 FUNCTIONAL DIARRHEA: ICD-10-CM

## 2024-04-08 DIAGNOSIS — F41.1 ANXIETY STATE: ICD-10-CM

## 2024-04-08 DIAGNOSIS — Z00.00 ROUTINE GENERAL MEDICAL EXAMINATION AT HEALTH CARE FACILITY: Primary | ICD-10-CM

## 2024-04-08 DIAGNOSIS — E78.2 MIXED HYPERLIPIDEMIA: ICD-10-CM

## 2024-04-08 PROCEDURE — 1170F FXNL STATUS ASSESSED: CPT | Performed by: FAMILY MEDICINE

## 2024-04-08 PROCEDURE — 3074F SYST BP LT 130 MM HG: CPT | Performed by: FAMILY MEDICINE

## 2024-04-08 PROCEDURE — 1159F MED LIST DOCD IN RCRD: CPT | Performed by: FAMILY MEDICINE

## 2024-04-08 PROCEDURE — 1036F TOBACCO NON-USER: CPT | Performed by: FAMILY MEDICINE

## 2024-04-08 PROCEDURE — 1123F ACP DISCUSS/DSCN MKR DOCD: CPT | Performed by: FAMILY MEDICINE

## 2024-04-08 PROCEDURE — 3079F DIAST BP 80-89 MM HG: CPT | Performed by: FAMILY MEDICINE

## 2024-04-08 PROCEDURE — G0439 PPPS, SUBSEQ VISIT: HCPCS | Performed by: FAMILY MEDICINE

## 2024-04-08 PROCEDURE — 1160F RVW MEDS BY RX/DR IN RCRD: CPT | Performed by: FAMILY MEDICINE

## 2024-04-08 PROCEDURE — 1158F ADVNC CARE PLAN TLK DOCD: CPT | Performed by: FAMILY MEDICINE

## 2024-04-08 RX ORDER — TOLTERODINE 2 MG/1
2 CAPSULE, EXTENDED RELEASE ORAL DAILY
Qty: 90 CAPSULE | Refills: 3 | Status: SHIPPED | OUTPATIENT
Start: 2024-04-08 | End: 2025-04-08

## 2024-04-08 RX ORDER — METOPROLOL SUCCINATE 25 MG/1
25 TABLET, EXTENDED RELEASE ORAL DAILY
Qty: 90 TABLET | Refills: 3 | Status: SHIPPED | OUTPATIENT
Start: 2024-04-08 | End: 2025-04-08

## 2024-04-08 ASSESSMENT — ACTIVITIES OF DAILY LIVING (ADL)
DRESSING: INDEPENDENT
DOING_HOUSEWORK: TOTAL CARE
MANAGING_FINANCES: TOTAL CARE
GROCERY_SHOPPING: TOTAL CARE
TAKING_MEDICATION: TOTAL CARE
BATHING: INDEPENDENT

## 2024-04-08 ASSESSMENT — PATIENT HEALTH QUESTIONNAIRE - PHQ9
2. FEELING DOWN, DEPRESSED OR HOPELESS: NOT AT ALL
SUM OF ALL RESPONSES TO PHQ9 QUESTIONS 1 AND 2: 0
1. LITTLE INTEREST OR PLEASURE IN DOING THINGS: NOT AT ALL

## 2024-04-08 ASSESSMENT — ENCOUNTER SYMPTOMS
LOSS OF SENSATION IN FEET: 0
CONFUSION: 1
DEPRESSION: 0
OCCASIONAL FEELINGS OF UNSTEADINESS: 0

## 2024-04-08 ASSESSMENT — COLUMBIA-SUICIDE SEVERITY RATING SCALE - C-SSRS
1. IN THE PAST MONTH, HAVE YOU WISHED YOU WERE DEAD OR WISHED YOU COULD GO TO SLEEP AND NOT WAKE UP?: NO
2. HAVE YOU ACTUALLY HAD ANY THOUGHTS OF KILLING YOURSELF?: NO
6. HAVE YOU EVER DONE ANYTHING, STARTED TO DO ANYTHING, OR PREPARED TO DO ANYTHING TO END YOUR LIFE?: NO

## 2024-04-08 NOTE — PATIENT INSTRUCTIONS
Assessment/Plan   Problem List Items Addressed This Visit       Anxiety state    Benign essential hypertension    Relevant Medications    metoprolol succinate XL (Toprol-XL) 25 mg 24 hr tablet    Gastroesophageal reflux disease without esophagitis    Hyperlipidemia, unspecified    Adult hypothyroidism    Osteoporosis    Sensory urge incontinence    Relevant Medications    tolterodine LA (Detrol LA) 2 mg 24 hr capsule    Chronic constipation    Mild late onset Alzheimer's dementia without behavioral disturbance, psychotic disturbance, mood disturbance, or anxiety (CMS/HCC)    Central retinal vein occlusion, right eye, stable    Peripheral vascular disease, unspecified (CMS/HCC)    Functional diarrhea     Other Visit Diagnoses       Routine general medical examination at health care facility    -  Primary           Patient was identified as a fall risk. Risk prevention instructions provided.      Ways to Help Prevent Falls at Home    Quick Tips   ? Ask for help if you need it. Most people want to help!   ? Get up slowly after sitting or laying down   ? Wear a medical alert device or keep cell phone in your pocket   ? Use night lights, especially areas near a bathroom   ? Keep the items you use often within reach on a small stool or end table   ? Use an assistive device such as walker or cane, as directed by provider/physical therapy   ? Use a non-slip mat and grab bars in your bathroom. Look for home health sections for best options     Other Areas to Focus On   ? Exercise and nutrition: Regular exercise or taking a falls prevention class are great ways improve strength and balance. Don’t forget to stay hydrated and bring a snack!   ? Medicine side effects: Some medicines can make you sleepy or dizzy, which could cause a fall. Ask your healthcare provider about the side effects your medicines could cause. Be sure to let them know if you take any vitamins or supplements as well.   ? Tripping hazards: Remove items you  could trip on, such as loose mats, rugs, cords, and clutter. Wear closed toe shoes with rubber soles.   ? Health and wellness: Get regular checkups with your healthcare provider, plus routine vision and hearing screenings. Talk with your healthcare provider about:   o Your medicines and the possible side effects - bring them in a bag if that is easier!   o Problems with balance or feeling dizzy   o Ways to promote bone health, such as Vitamin D and calcium supplements   o Questions or concerns about falling     *Ask your healthcare team if you have questions     ©[facility], 2022

## 2024-04-08 NOTE — PROGRESS NOTES
" Subjective   Reason for Visit: Gabrielle Turner is an 88 y.o. female here for a Medicare Wellness visit.      Reviewed all medications by prescribing practitioner or clinical pharmacist (such as prescriptions, OTCs, herbal therapies and supplements) and documented in the medical record.    BERNADRO Hardin is here with [place], memory and care at home is an issue and she may need eventually, has forms today. That may eventually need placement. She   Patient Care Team:  Allyn Solis MD as PCP - General (Family Medicine)  Allyn Solis MD as PCP - [employer] PCP     Review of Systems   Genitourinary:  Positive for enuresis.   Psychiatric/Behavioral:  Positive for confusion.    All other systems reviewed and are negative.      Objective   Vitals:  /80   Pulse 63   Resp 16   Ht 1.651 m (5' 5\")   Wt 57.9 kg (127 lb 9.6 oz)   SpO2 95%   BMI 21.23 kg/m²       Physical Exam  Vitals reviewed.   Constitutional:       Appearance: Normal appearance.   HENT:      Head: Normocephalic and atraumatic.      Right Ear: Tympanic membrane normal.      Left Ear: Tympanic membrane normal.      Nose: Nose normal.      Mouth/Throat:      Mouth: Mucous membranes are moist.      Pharynx: Oropharynx is clear.   Eyes:      Extraocular Movements: Extraocular movements intact.      Conjunctiva/sclera: Conjunctivae normal.      Pupils: Pupils are equal, round, and reactive to light.   Cardiovascular:      Rate and Rhythm: Normal rate and regular rhythm.      Pulses: Normal pulses.      Heart sounds: Normal heart sounds.   Pulmonary:      Effort: Pulmonary effort is normal.      Breath sounds: Normal breath sounds.   Abdominal:      General: Abdomen is flat. Bowel sounds are normal.      Palpations: Abdomen is soft.   Musculoskeletal:         General: Normal range of motion.      Cervical back: Normal range of motion and neck supple.   Skin:     General: Skin is warm and dry.      Capillary Refill: Capillary refill " takes less than 2 seconds.   Neurological:      Mental Status: She is alert and oriented to person, place, and time.   Psychiatric:      Comments: dementia       Assessment/Plan   Problem List Items Addressed This Visit       Anxiety state    Benign essential hypertension    Relevant Medications    metoprolol succinate XL (Toprol-XL) 25 mg 24 hr tablet    Gastroesophageal reflux disease without esophagitis    Hyperlipidemia, unspecified    Adult hypothyroidism    Osteoporosis    Sensory urge incontinence    Relevant Medications    tolterodine LA (Detrol LA) 2 mg 24 hr capsule    Chronic constipation    Mild late onset Alzheimer's dementia without behavioral disturbance, psychotic disturbance, mood disturbance, or anxiety (CMS/HCC)    Central retinal vein occlusion, right eye, stable    Peripheral vascular disease, unspecified (CMS/HCC)    Functional diarrhea     Other Visit Diagnoses       Routine general medical examination at health care facility    -  Primary           Patient was identified as a fall risk. Risk prevention instructions provided.

## 2024-06-11 ENCOUNTER — HOSPITAL ENCOUNTER (EMERGENCY)
Facility: HOSPITAL | Age: 89
Discharge: HOME | End: 2024-06-11
Payer: MEDICARE

## 2024-06-11 ENCOUNTER — APPOINTMENT (OUTPATIENT)
Dept: RADIOLOGY | Facility: HOSPITAL | Age: 89
End: 2024-06-11
Payer: MEDICARE

## 2024-06-11 VITALS
OXYGEN SATURATION: 97 % | HEIGHT: 63 IN | DIASTOLIC BLOOD PRESSURE: 77 MMHG | RESPIRATION RATE: 18 BRPM | WEIGHT: 130 LBS | TEMPERATURE: 97.8 F | BODY MASS INDEX: 23.04 KG/M2 | SYSTOLIC BLOOD PRESSURE: 151 MMHG | HEART RATE: 60 BPM

## 2024-06-11 DIAGNOSIS — S62.102A CLOSED FRACTURE OF LEFT WRIST, INITIAL ENCOUNTER: Primary | ICD-10-CM

## 2024-06-11 PROCEDURE — 99284 EMERGENCY DEPT VISIT MOD MDM: CPT

## 2024-06-11 PROCEDURE — 73090 X-RAY EXAM OF FOREARM: CPT | Mod: LT

## 2024-06-11 PROCEDURE — 73110 X-RAY EXAM OF WRIST: CPT | Mod: LT

## 2024-06-11 PROCEDURE — 73090 X-RAY EXAM OF FOREARM: CPT | Mod: LEFT SIDE | Performed by: RADIOLOGY

## 2024-06-11 PROCEDURE — 73130 X-RAY EXAM OF HAND: CPT | Mod: LEFT SIDE | Performed by: RADIOLOGY

## 2024-06-11 PROCEDURE — 29125 APPL SHORT ARM SPLINT STATIC: CPT | Mod: LT

## 2024-06-11 PROCEDURE — 73130 X-RAY EXAM OF HAND: CPT | Mod: LT

## 2024-06-11 PROCEDURE — 73110 X-RAY EXAM OF WRIST: CPT | Mod: LEFT SIDE | Performed by: RADIOLOGY

## 2024-06-11 RX ORDER — HYDROCODONE BITARTRATE AND ACETAMINOPHEN 5; 325 MG/1; MG/1
1 TABLET ORAL EVERY 6 HOURS PRN
Qty: 12 TABLET | Refills: 0 | Status: SHIPPED | OUTPATIENT
Start: 2024-06-11 | End: 2024-06-20 | Stop reason: HOSPADM

## 2024-06-11 RX ORDER — ACETAMINOPHEN 325 MG/1
975 TABLET ORAL ONCE
Status: COMPLETED | OUTPATIENT
Start: 2024-06-11 | End: 2024-06-11

## 2024-06-11 ASSESSMENT — LIFESTYLE VARIABLES
HAVE YOU EVER FELT YOU SHOULD CUT DOWN ON YOUR DRINKING: NO
HAVE PEOPLE ANNOYED YOU BY CRITICIZING YOUR DRINKING: NO
EVER FELT BAD OR GUILTY ABOUT YOUR DRINKING: NO
TOTAL SCORE: 0
EVER HAD A DRINK FIRST THING IN THE MORNING TO STEADY YOUR NERVES TO GET RID OF A HANGOVER: NO

## 2024-06-11 ASSESSMENT — COLUMBIA-SUICIDE SEVERITY RATING SCALE - C-SSRS
6. HAVE YOU EVER DONE ANYTHING, STARTED TO DO ANYTHING, OR PREPARED TO DO ANYTHING TO END YOUR LIFE?: NO
2. HAVE YOU ACTUALLY HAD ANY THOUGHTS OF KILLING YOURSELF?: NO
1. IN THE PAST MONTH, HAVE YOU WISHED YOU WERE DEAD OR WISHED YOU COULD GO TO SLEEP AND NOT WAKE UP?: NO

## 2024-06-11 ASSESSMENT — PAIN DESCRIPTION - LOCATION: LOCATION: WRIST

## 2024-06-11 ASSESSMENT — PAIN - FUNCTIONAL ASSESSMENT: PAIN_FUNCTIONAL_ASSESSMENT: 0-10

## 2024-06-11 ASSESSMENT — PAIN DESCRIPTION - ORIENTATION: ORIENTATION: LEFT

## 2024-06-11 ASSESSMENT — PAIN SCALES - GENERAL: PAINLEVEL_OUTOF10: 5 - MODERATE PAIN

## 2024-06-12 PROBLEM — S52.502A: Status: ACTIVE | Noted: 2024-06-12

## 2024-06-12 PROBLEM — S52.602A: Status: ACTIVE | Noted: 2024-06-12

## 2024-06-12 NOTE — ED PROVIDER NOTES
HPI   Chief Complaint   Patient presents with    Arm Injury     Left wrist injury after fall at 2000 this evening. Not on blood thinners. Denies hitting head. Patient explains she came down on her butt and tried to catch herself.        A 88 year old female presents with fall a few hours ago. Fall was witnessed by her  that is with her tonight. It was on a carpeted surface, no LOC, did not hit her head, no obvious deformity. Point tender at midpoint of dorsal aspect on forearm. Pain with flexion and extension of wrist and digits. Patient has significant hx of dementia, so  provided a large portion of today's history. He had wrapped her left arm and wrist in an ACE wrap that was present upon arrival. She denies taking any medication for pain prior to arrival. Denies pain radiating up to elbow or shoulder. Denies h/a, chest pain, dizziness. Is not on any blood thinners.                           Nikolay Coma Scale Score: 14                     Patient History   Past Medical History:   Diagnosis Date    Other specified postprocedural states     History of colonoscopy    Personal history of other diseases of the digestive system 04/27/2016    History of constipation     Past Surgical History:   Procedure Laterality Date    COLONOSCOPY  04/27/2016    Complete Colonoscopy    HERNIA REPAIR  04/27/2016    Hernia Repair     No family history on file.  Social History     Tobacco Use    Smoking status: Never    Smokeless tobacco: Never   Vaping Use    Vaping status: Never Used   Substance Use Topics    Alcohol use: Never    Drug use: Never       Physical Exam   ED Triage Vitals [06/11/24 2028]   Temperature Heart Rate Respirations BP   36.6 °C (97.8 °F) 60 18 151/77      Pulse Ox Temp Source Heart Rate Source Patient Position   97 % Temporal Monitor Sitting      BP Location FiO2 (%)     Right arm --       Physical Exam  Vitals and nursing note reviewed.   HENT:      Head: Normocephalic and atraumatic.    Cardiovascular:      Rate and Rhythm: Normal rate and regular rhythm.      Pulses: Normal pulses.      Heart sounds: Normal heart sounds.   Pulmonary:      Effort: Pulmonary effort is normal.      Breath sounds: Normal breath sounds.   Musculoskeletal:      Comments: There is no obvious deformity noted to the left hand, wrist, or forearm.  The patient has focal tenderness noted to the medial aspect of the mid radius.  The patient has pain with flexion and extension of the hand.  Hand grasp are strong and equal bilaterally.  No scaphoid or snuffbox tenderness.  Distal extremity with brisk cap refill and sensation intact.   Skin:     General: Skin is warm.      Capillary Refill: Capillary refill takes less than 2 seconds.   Neurological:      General: No focal deficit present.      Mental Status: She is alert.   Psychiatric:         Mood and Affect: Mood normal.         ED Course & MDM   Diagnoses as of 06/11/24 2248   Closed fracture of left wrist, initial encounter       Medical Decision Making  The patient was seen and evaluated by the nurse practitioner, Virginia Smith.  The patient is presenting to the emergency room, complaints of left arm pain status post fall.  Patient did not suffer a head, neck, or back injury.  She was medicated with Tylenol 975 mg p.o.  X-rays of the left hand, wrist, and forearm were obtained and revealed that the patient had comminuted fractures of the ulna and radius.  The patient was notified of their imaging results.  The patient was placed in a custom sugar-tong splint by me.  She was neurovascularly intact after splint placement.  The patient was placed in a sling for comfort.  She was educated on rice therapy and splint care.  She is to follow-up with Dr. Del Valle for further evaluation and treatment.  The patient was advised to use Tylenol over-the-counter.  The patient was provided prescription for Norco for home administration for breakthrough pain.  Patient was discharged in  stable condition with discharge instructions given.  Patient was agreeable with discharge plan..        Procedure  Procedures     Virginia Smith, MEERA-CNP  06/11/24 2350

## 2024-06-13 ENCOUNTER — TELEPHONE (OUTPATIENT)
Dept: PRIMARY CARE | Facility: CLINIC | Age: 89
End: 2024-06-13
Payer: MEDICARE

## 2024-06-17 ENCOUNTER — APPOINTMENT (OUTPATIENT)
Dept: ORTHOPEDIC SURGERY | Facility: CLINIC | Age: 89
End: 2024-06-17
Payer: MEDICARE

## 2024-06-17 VITALS — HEIGHT: 63 IN | WEIGHT: 130 LBS | BODY MASS INDEX: 23.04 KG/M2

## 2024-06-17 DIAGNOSIS — S52.502A TRAUMATIC CLOSED DISPLACED FRACTURE OF DISTAL END OF LEFT RADIUS AND ULNA, INITIAL ENCOUNTER: Primary | ICD-10-CM

## 2024-06-17 DIAGNOSIS — S52.602A TRAUMATIC CLOSED DISPLACED FRACTURE OF DISTAL END OF LEFT RADIUS AND ULNA, INITIAL ENCOUNTER: Primary | ICD-10-CM

## 2024-06-17 PROCEDURE — 1036F TOBACCO NON-USER: CPT | Performed by: STUDENT IN AN ORGANIZED HEALTH CARE EDUCATION/TRAINING PROGRAM

## 2024-06-17 PROCEDURE — 1159F MED LIST DOCD IN RCRD: CPT | Performed by: STUDENT IN AN ORGANIZED HEALTH CARE EDUCATION/TRAINING PROGRAM

## 2024-06-17 PROCEDURE — 1123F ACP DISCUSS/DSCN MKR DOCD: CPT | Performed by: STUDENT IN AN ORGANIZED HEALTH CARE EDUCATION/TRAINING PROGRAM

## 2024-06-17 PROCEDURE — 1160F RVW MEDS BY RX/DR IN RCRD: CPT | Performed by: STUDENT IN AN ORGANIZED HEALTH CARE EDUCATION/TRAINING PROGRAM

## 2024-06-17 PROCEDURE — 99214 OFFICE O/P EST MOD 30 MIN: CPT | Performed by: STUDENT IN AN ORGANIZED HEALTH CARE EDUCATION/TRAINING PROGRAM

## 2024-06-17 NOTE — H&P (VIEW-ONLY)
PRIMARY CARE PHYSICIAN: Allyn Solis MD  REFERRING PROVIDER: No referring provider defined for this encounter.     CONSULT ORTHOPAEDIC: Hand and Wrist Evaluation    ASSESSMENT & PLAN    Impression: 89 y.o. female with left displaced distal radius fracture.    Plan:   I explained to the patient the nature of their diagnosis.  I reviewed their imaging studies with them.    Based on the history, physical exam and imaging studies above, the patient's presentation is consistent with the above diagnosis.  I had a long discussion with the patient regarding their presentation and the treatment options.  We discussed initial nonoperative versus operative management options as well as potential further diagnostic imaging.  I reviewed the patient's x-ray findings with her and her family.  Her distal radius fracture is displaced with loss of angulation and height.  She is otherwise quite active and independent prior to this injury.  Based on her fracture pattern and positioning, I do recommend surgical intervention in the form of a left distal radius open reduction internal fixation.  I thoroughly explained the risks and benefits as well as the expected postoperative timeline for the proposed procedure versus nonoperative management. Risks of this procedure include but are not limited to bleeding, infection, nerve injury, DVT and failure of repair or implant.  The patient and her family expressed understanding and wished to proceed with surgical intervention.  All questions were answered. We will begin the presurgical process and find them a surgical date in a timely fashion that works for them.    At the end of the visit, all questions were answered in full. The patient is in agreement with the plan and recommendations. They will call the office with any questions/concerns.    Note dictated with [employer] software. Completed without full typed error editing and sent to avoid delay.      SUBJECTIVE  CHIEF COMPLAINT:   Chief Complaint   Patient presents with    Left Wrist - Pain        HPI: Gabrielle Turner is a 89 y.o. patient who presents today with left wrist fracture. Patient fell on 6/11/24.  She landed directly onto her left wrist.  She had immediate pain and deformity.  X-rays in the emergency room demonstrated displaced and angulated distal radius fracture.  She was splinted and instructed to follow-up orthopedics for further management.  No issues with this wrist in the past.  She is accompanied today by her parents.  They deny any associated neck pain.  No numbness, tingling, or paresthesias.    REVIEW OF SYSTEMS  Constitutional: See HPI for pain assessment, No significant weight loss, recent trauma  Cardiovascular: No chest pain, shortness of breath  Respiratory: No difficulty breathing, cough  Gastrointestinal: No nausea, vomiting, diarrhea, constipation  Musculoskeletal: Noted in HPI, positive for pain, restricted motion, stiffness  Integumentary: No rashes, easy bruising, redness   Neurological: no numbness or tingling in extremities, no gait disturbances   Psychiatric: No mood changes, memory changes, social issues  Heme/Lymph: no excessive swelling, easy bruising, excessive bleeding  ENT: No hearing changes  Eyes: No vision changes    Past Medical History:   Diagnosis Date    Other specified postprocedural states     History of colonoscopy    Personal history of other diseases of the digestive system 04/27/2016    History of constipation        No Known Allergies     Past Surgical History:   Procedure Laterality Date    COLONOSCOPY  04/27/2016    Complete Colonoscopy    HERNIA REPAIR  04/27/2016    Hernia Repair        No family history on file.     Social History     Socioeconomic History    Marital status:      Spouse name: Not on file    Number of children: Not on file    Years of education: Not on file    Highest education level: Not on file   Occupational History    Not  on file   Tobacco Use    Smoking status: Never    Smokeless tobacco: Never   Vaping Use    Vaping status: Never Used   Substance and Sexual Activity    Alcohol use: Never    Drug use: Never    Sexual activity: Defer   Other Topics Concern    Not on file   Social History Narrative    Not on file     Social Determinants of Health     Financial Resource Strain: Not on file   Food Insecurity: Not on file   Transportation Needs: Not on file   Physical Activity: Not on file   Stress: Not on file   Social Connections: Not on file   Intimate Partner Violence: Not on file   Housing Stability: Not on file        CURRENT MEDICATIONS:   Current Outpatient Medications   Medication Sig Dispense Refill    acetaminophen (Tylenol) 500 mg tablet Take by mouth.      amLODIPine (Norvasc) 5 mg tablet Take 1 tablet (5 mg) by mouth once daily. as directed 90 tablet 3    aspirin 81 mg EC tablet Take by mouth.      calcium carbonate-vitamin D3 600 mg-20 mcg (800 unit) tablet Take 1 tablet by mouth once daily.      cholecalciferol (Vitamin D-3) 50 mcg (2,000 unit) capsule Take by mouth.      citalopram (CeleXA) 40 mg tablet Take 1 tablet (40 mg) by mouth once daily. 90 tablet 3    docusate sodium (Colace) 100 mg capsule Take 1 capsule (100 mg) by mouth once daily at bedtime.      donepezil (Aricept) 10 mg tablet Take 1 tablet by mouth at bedtime 90 tablet 0    levothyroxine (Synthroid, Levoxyl) 75 mcg tablet Take 1 tablet (75 mcg) by mouth once daily. as directed 90 tablet 3    metoprolol succinate XL (Toprol-XL) 25 mg 24 hr tablet Take 1 tablet (25 mg) by mouth once daily. Do not crush or chew. 90 tablet 3    omega-3 fatty acids-fish oil 300-1,000 mg capsule Take 2 capsules (2,000 mg) by mouth once daily.      omeprazole (PriLOSEC) 20 mg DR capsule TAKE ONE CAPSULE BY MOUTH EVERY DAY 90 capsule 0    simvastatin (Zocor) 20 mg tablet Take 1 tablet (20 mg) by mouth once daily. 90 tablet 3    tolterodine LA (Detrol LA) 2 mg 24 hr capsule Take 1  "capsule (2 mg) by mouth once daily. 90 capsule 3     No current facility-administered medications for this visit.        OBJECTIVE    PHYSICAL EXAM      11/29/2022     3:10 PM 4/7/2023    10:25 AM 6/15/2023     2:00 PM 10/6/2023    12:35 PM 4/8/2024    10:08 AM 6/11/2024     8:28 PM 6/17/2024     9:38 AM   Vitals   Systolic 128 112 134 120 122 151    Diastolic 76 70 82 80 80 77    Heart Rate 60 51 78 64 63 60    Temp 36.6 °C (97.8 °F) 36.3 °C (97.3 °F)  36.4 °C (97.6 °F)  36.6 °C (97.8 °F)    Resp 17 17   16 18    Height (in) 1.626 m (5' 4\") 1.651 m (5' 5\")  1.651 m (5' 5\") 1.651 m (5' 5\") 1.6 m (5' 3\") 1.6 m (5' 3\")   Weight (lb) 138 136 136.8 130 127.6 130 130   BMI 23.69 kg/m2 22.63 kg/m2 22.76 kg/m2 21.63 kg/m2 21.23 kg/m2 23.03 kg/m2 23.03 kg/m2   BSA (m2) 1.68 m2 1.68 m2 1.69 m2 1.64 m2 1.63 m2 1.62 m2 1.62 m2   Visit Report  Report Report Report Report  Report      Body mass index is 23.03 kg/m².    GENERAL: A/Ox3, NAD. Appears healthy, well nourished  PSYCHIATRIC: Mood stable, appropriate memory recall  EYES: EOM intact, no scleral icterus  CARDIOVASCULAR: Palpable peripheral pulses  LUNGS: Breathing non-labored on room air  SKIN: no erythema, rashes, or ecchymosis     MUSCULOSKELETAL:  Laterality: left Hand and Wrist Exam  - Symmetric shoulder and elbow ROM  - Skin intact  - No erythema or warmth  -Positive ecchymosis and soft tissue swelling extending into the fingers  - Wrist ROM: Deferred   - Strength: Deferred  - Palpation: Positive tenderness of the distal radius  - Stability: Varus and valgus stress stable  - Able to make a loose fist    NEUROVASCULAR:  - Neurovascular Status: sensation intact to light touch distally, upper extremity motor grossly intact  - Capillary refill brisk at extremities, Bilateral peripheral pulses 2+    Imaging: Multiple views of the affected left hand/wrist(s) demonstrate: Displaced and shortened comminuted intra-articular distal radius fracture.   X-rays were personally " reviewed and interpreted by me.  Radiology reports were reviewed by me as well, if readily available at the time.      Nelson Del Valle MD  Attending Surgeon    Sports Medicine Orthopaedic Surgery  [facility]

## 2024-06-17 NOTE — PROGRESS NOTES
PRIMARY CARE PHYSICIAN: Allyn Solis MD  REFERRING PROVIDER: No referring provider defined for this encounter.     CONSULT ORTHOPAEDIC: Hand and Wrist Evaluation    ASSESSMENT & PLAN    Impression: 89 y.o. female with left displaced distal radius fracture.    Plan:   I explained to the patient the nature of their diagnosis.  I reviewed their imaging studies with them.    Based on the history, physical exam and imaging studies above, the patient's presentation is consistent with the above diagnosis.  I had a long discussion with the patient regarding their presentation and the treatment options.  We discussed initial nonoperative versus operative management options as well as potential further diagnostic imaging.  I reviewed the patient's x-ray findings with her and her family.  Her distal radius fracture is displaced with loss of angulation and height.  She is otherwise quite active and independent prior to this injury.  Based on her fracture pattern and positioning, I do recommend surgical intervention in the form of a left distal radius open reduction internal fixation.  I thoroughly explained the risks and benefits as well as the expected postoperative timeline for the proposed procedure versus nonoperative management. Risks of this procedure include but are not limited to bleeding, infection, nerve injury, DVT and failure of repair or implant.  The patient and her family expressed understanding and wished to proceed with surgical intervention.  All questions were answered. We will begin the presurgical process and find them a surgical date in a timely fashion that works for them.    At the end of the visit, all questions were answered in full. The patient is in agreement with the plan and recommendations. They will call the office with any questions/concerns.    Note dictated with [employer] software. Completed without full typed error editing and sent to avoid delay.      SUBJECTIVE  CHIEF COMPLAINT:   Chief Complaint   Patient presents with    Left Wrist - Pain        HPI: Gabrielle Turner is a 89 y.o. patient who presents today with left wrist fracture. Patient fell on 6/11/24.  She landed directly onto her left wrist.  She had immediate pain and deformity.  X-rays in the emergency room demonstrated displaced and angulated distal radius fracture.  She was splinted and instructed to follow-up orthopedics for further management.  No issues with this wrist in the past.  She is accompanied today by her parents.  They deny any associated neck pain.  No numbness, tingling, or paresthesias.    REVIEW OF SYSTEMS  Constitutional: See HPI for pain assessment, No significant weight loss, recent trauma  Cardiovascular: No chest pain, shortness of breath  Respiratory: No difficulty breathing, cough  Gastrointestinal: No nausea, vomiting, diarrhea, constipation  Musculoskeletal: Noted in HPI, positive for pain, restricted motion, stiffness  Integumentary: No rashes, easy bruising, redness   Neurological: no numbness or tingling in extremities, no gait disturbances   Psychiatric: No mood changes, memory changes, social issues  Heme/Lymph: no excessive swelling, easy bruising, excessive bleeding  ENT: No hearing changes  Eyes: No vision changes    Past Medical History:   Diagnosis Date    Other specified postprocedural states     History of colonoscopy    Personal history of other diseases of the digestive system 04/27/2016    History of constipation        No Known Allergies     Past Surgical History:   Procedure Laterality Date    COLONOSCOPY  04/27/2016    Complete Colonoscopy    HERNIA REPAIR  04/27/2016    Hernia Repair        No family history on file.     Social History     Socioeconomic History    Marital status:      Spouse name: Not on file    Number of children: Not on file    Years of education: Not on file    Highest education level: Not on file   Occupational History    Not  on file   Tobacco Use    Smoking status: Never    Smokeless tobacco: Never   Vaping Use    Vaping status: Never Used   Substance and Sexual Activity    Alcohol use: Never    Drug use: Never    Sexual activity: Defer   Other Topics Concern    Not on file   Social History Narrative    Not on file     Social Determinants of Health     Financial Resource Strain: Not on file   Food Insecurity: Not on file   Transportation Needs: Not on file   Physical Activity: Not on file   Stress: Not on file   Social Connections: Not on file   Intimate Partner Violence: Not on file   Housing Stability: Not on file        CURRENT MEDICATIONS:   Current Outpatient Medications   Medication Sig Dispense Refill    acetaminophen (Tylenol) 500 mg tablet Take by mouth.      amLODIPine (Norvasc) 5 mg tablet Take 1 tablet (5 mg) by mouth once daily. as directed 90 tablet 3    aspirin 81 mg EC tablet Take by mouth.      calcium carbonate-vitamin D3 600 mg-20 mcg (800 unit) tablet Take 1 tablet by mouth once daily.      cholecalciferol (Vitamin D-3) 50 mcg (2,000 unit) capsule Take by mouth.      citalopram (CeleXA) 40 mg tablet Take 1 tablet (40 mg) by mouth once daily. 90 tablet 3    docusate sodium (Colace) 100 mg capsule Take 1 capsule (100 mg) by mouth once daily at bedtime.      donepezil (Aricept) 10 mg tablet Take 1 tablet by mouth at bedtime 90 tablet 0    levothyroxine (Synthroid, Levoxyl) 75 mcg tablet Take 1 tablet (75 mcg) by mouth once daily. as directed 90 tablet 3    metoprolol succinate XL (Toprol-XL) 25 mg 24 hr tablet Take 1 tablet (25 mg) by mouth once daily. Do not crush or chew. 90 tablet 3    omega-3 fatty acids-fish oil 300-1,000 mg capsule Take 2 capsules (2,000 mg) by mouth once daily.      omeprazole (PriLOSEC) 20 mg DR capsule TAKE ONE CAPSULE BY MOUTH EVERY DAY 90 capsule 0    simvastatin (Zocor) 20 mg tablet Take 1 tablet (20 mg) by mouth once daily. 90 tablet 3    tolterodine LA (Detrol LA) 2 mg 24 hr capsule Take 1  "capsule (2 mg) by mouth once daily. 90 capsule 3     No current facility-administered medications for this visit.        OBJECTIVE    PHYSICAL EXAM      11/29/2022     3:10 PM 4/7/2023    10:25 AM 6/15/2023     2:00 PM 10/6/2023    12:35 PM 4/8/2024    10:08 AM 6/11/2024     8:28 PM 6/17/2024     9:38 AM   Vitals   Systolic 128 112 134 120 122 151    Diastolic 76 70 82 80 80 77    Heart Rate 60 51 78 64 63 60    Temp 36.6 °C (97.8 °F) 36.3 °C (97.3 °F)  36.4 °C (97.6 °F)  36.6 °C (97.8 °F)    Resp 17 17   16 18    Height (in) 1.626 m (5' 4\") 1.651 m (5' 5\")  1.651 m (5' 5\") 1.651 m (5' 5\") 1.6 m (5' 3\") 1.6 m (5' 3\")   Weight (lb) 138 136 136.8 130 127.6 130 130   BMI 23.69 kg/m2 22.63 kg/m2 22.76 kg/m2 21.63 kg/m2 21.23 kg/m2 23.03 kg/m2 23.03 kg/m2   BSA (m2) 1.68 m2 1.68 m2 1.69 m2 1.64 m2 1.63 m2 1.62 m2 1.62 m2   Visit Report  Report Report Report Report  Report      Body mass index is 23.03 kg/m².    GENERAL: A/Ox3, NAD. Appears healthy, well nourished  PSYCHIATRIC: Mood stable, appropriate memory recall  EYES: EOM intact, no scleral icterus  CARDIOVASCULAR: Palpable peripheral pulses  LUNGS: Breathing non-labored on room air  SKIN: no erythema, rashes, or ecchymosis     MUSCULOSKELETAL:  Laterality: left Hand and Wrist Exam  - Symmetric shoulder and elbow ROM  - Skin intact  - No erythema or warmth  -Positive ecchymosis and soft tissue swelling extending into the fingers  - Wrist ROM: Deferred   - Strength: Deferred  - Palpation: Positive tenderness of the distal radius  - Stability: Varus and valgus stress stable  - Able to make a loose fist    NEUROVASCULAR:  - Neurovascular Status: sensation intact to light touch distally, upper extremity motor grossly intact  - Capillary refill brisk at extremities, Bilateral peripheral pulses 2+    Imaging: Multiple views of the affected left hand/wrist(s) demonstrate: Displaced and shortened comminuted intra-articular distal radius fracture.   X-rays were personally " reviewed and interpreted by me.  Radiology reports were reviewed by me as well, if readily available at the time.      Nelson Del Valle MD  Attending Surgeon    Sports Medicine Orthopaedic Surgery  [facility]

## 2024-06-19 NOTE — DISCHARGE INSTRUCTIONS
Nelson Del Valle M.D.   Sports Medicine Orthopaedic Surgery    [facility]  [address]                  [address]       [address]  [facility], [address]   Phone: [phone]         Phone: [phone]       Phone: [phone]   Fax: [phone]                         Fax: [phone]       Fax: [phone]     After hours phone number: [phone]    AFTER SURGERY     Anesthesia  If you received a nerve block during surgery, you may have numbness or inability to move the limb. Do not be alarmed as this may last 8-36 hours depending upon the amount and type of medication used by the anesthesiologist. Make sure If you are experiencing numbness after 36 hours, please call the office. When the nerve block begins to wear off, you will feel a tingling sensation, like pins and needles. It is important that you start taking the pain medication at that time to ensure that you “stay ahead of the pain.” It is important to take the pain medicine when the pain level is a 4 or 5/10, before it gets too high.    Do not operate heavy machinery, make major decisions, drink alcohol or smoke for 24 hours. Do not drink alcohol while taking pain medications.    Prescribed Medications   Narcotic pain medicine (Oxycodone): The goal of post-operative pain management is pain control, NOT pain elimination. You should expect some pain after surgery - this pain helps you protect itself while it is healing. Constipation, nausea, itching, and drowsiness are side effects of this type of medication. You should take an over-the-counter stool softener (Colace and/or Senna) while taking narcotics to prevent constipation. If you experience itching, over the counter Benadryl may be helpful. Narcotic pain medications often produce drowsiness and it is against the law to operate a vehicle  while taking these medications. If you are taking oxycodone, you should take acetaminophen (Tylenol) around the clock to decrease baseline pain. Do not take Tylenol-containing products while on Percocet or Langtry.   Refill Policy: For concerns over your safety due to the rising opioid addiction epidemic in the [place], refills of your narcotic pain medications will only be provided on a case by case basis. Please use these medications judiciously.    Anti-inflammatory (NSAID) medicine (Naproxen or Mobic): These are both anti-inflammatory and pain relief. Do NOT take this medication if you have had an ulcer in the past unless you have cleared this with your primary care doctor. You should take NSAIDs with food or antacid to reduce the chance of upset stomach. Depending on your surgery, Dr. Del Valle may instruct you to avoid these medications.    Anti-nausea medicine (ondansetron/Zofran): sometimes patients experience nausea related to either anesthesia or the narcotic pain medication. If this is the case you will find this medication helpful.    DVT prophylaxis (Aspirin or Eliquis): For most patients, activity alone is sufficient to prevent dangerous blood clots, but in some cases your personal risk profile and/or the type of surgery you have undergone makes it necessary that you take medication to help prevent blood clots. Dr. Del Valle will inform you if you are to start one of these medications postoperatively.    Stool softener (Colace and/or Senna): are available over the counter at your local pharmacy and should be taken while you are taking narcotic pain medication to avoid constipation. You should stop taking these medications if you develop diarrhea. Over the counter laxatives may be used if you develop painful constipation.     Diet   Start with clear liquids (water, juice, Gatorade) and light foods (jello, soup, crackers). Progress to normal diet as tolerated if you are not nauseated. Avoid greasy or spicy  foods for the first 24hrs to avoid GI upset. Increase fluid intake to help prevent constipation.    Dressings / Wound Care  You may remove the outer dressing after 3 days and then can shower. (If you have a splint, please leave the splint in place until follow-up.) Do not remove Steri-strips (white stickers) if present over your incisions. Steri-strips may fall off on their own, which is normal. After the bandage has been removed, you may leave the incisions open to air. Alternatively, if you prefer to keep them covered, you may do so with Band-Aids, a light gauze dressing, or a clean ACE wrap. You may shower after the bandage has been removed (3 days), but it is very important that you pat the wounds dry after the shower. It is OK to allow soap and water to run over the wound - DO NOT soak or scrub the wound. Outside of the shower, keep your incision clean and dry until your first postoperative visit, approximately 10-14 days after surgery. You may remove your sling or brace to shower, unless otherwise instructed. As your balance may be affected by recent surgery, we recommend placing a plastic chair in the shower to help prevent falls. Do NOT take baths, go into a pool, or soak the operative site until approved by Dr. Del Valle.    Bracing / Physical Therapy   If you were given one, make sure you wear sling or brace at all times until your follow-up with Dr. Del Valle. Only remove your sling or brace for physical therapy, home exercises, and hygiene. These are typically used for 4-6 weeks after surgery in order to protect the healing of the tissue.     Physical therapy is just as important to your recovery as the actual operation! If you were given a prescription for physical therapy, make sure you go to your appointments and do your exercises daily at home (especially motion exercises).     Ice is a very important part of your recovery. It helps reduce inflammation and improves pain control. You should ice a few times  each day for 20-30 minutes at a time. Please make sure there is something under the ice (clean towel, cloth, T-shirt) so that the ice doesn't directly contact your skin. If you ordered a commercially available ice machine (optional) and a compression setting is available, you should use LOW or no compression during the first 5 days. After that, you may increase compression setting as tolerated. If the compression is bothering you then do not use compression.    Driving / Travel  Ultimately, it is your judgment to decide when you are safe to drive, but if you are at all unsure, do not risk your life or someone else's. As a general guideline, you will not be able to drive for 4-6 weeks after surgery. You should certainly not drive while on narcotics pain medication or while in a brace or sling.     Avoid flights and long distance traveling for 6 weeks after surgery. It is important to discuss your travel plans with Dr. Del Valle, as additional medications may need to be prescribed to help prevent blood clots if certain travel is unavoidable.     Return to Work or School   Your return to work will depend on what surgery was done and what type of work you do. Please note that these are general guidelines, and there may be modifications based on your unique situation. Typically, you may return to sedentary work or school 3-7 days after surgery if pain is tolerable and you are no longer requiring narcotic pain medication. In conjunction with your input, Dr. Del Valle will determine when you may return to more physically rigorous demands.     If you had Knee or Hip Surgery   If your surgery involves a ligament reconstruction or tendon repair, you will typically be prescribed crutches for at least the first few days until pain and the postoperative protocol allows you to fully bear weight and also wear a brace for 4-6 weeks. If cartilage surgery or meniscus repair is performed, you may be on crutches for 6 weeks. Individual  rehabilitation guidelines will vary based upon the unique situation and surgery of every patient, but take these general guidelines into account when planning return to work.     If you had Shoulder Surgery   If your surgery involves a repair (rotator cuff repair, labral repair), you will have a sling on for six weeks after surgery. If you have a sling, you will need to wear it all day. Please wear the sling at all times except for bathing for the first 2 weeks minimum. As long as you can abide by the restrictions, you can return to work when you feel like you can do so safely. However, you will need to take into consideration driving and activities related to your job. You may be able to safely loosen it if you are able to keep your arm supported. Please understand that you will NOT be able to work with your arm away from your body, above shoulder level, or use your arm against gravity for approximately 8 weeks. For jobs that require physical labor, you may require four months or more to return to work. If your surgery does NOT involve a repair (subacromial decompression, distal clavicle excision, capsular release), then you will be in a sling for only a few days after surgery. When comfortable, you may return to work when ready to conduct normal activities of your job. Remember that you may be on narcotic pain medications and these should be discontinued prior to your return to work. For jobs that require physical labor, you may require 6 weeks or more to return to work.     If you had Elbow or Wrist Surgery   If your surgery involves a repair or reconstruction, you will have a splint and sling on followed by a brace for four to six weeks after surgery. As long as you can abide by the restrictions, you can return to work when you feel like you can do so safely. However, you will need to take into consideration driving and activities related to your job. If you have a sling, you will need to wear it all day unless  otherwise instructed. You may be able to safely loosen it if you are able to keep your arm supported. For jobs that require physical labor, you may require four months or more to return to work.    If you had Ankle Surgery   If your surgery involves a repair or reconstruction, you will have a splint followed by a walking boot for four to six weeks after surgery. As long as you can abide by the restrictions, you can return to work when you feel like you can do so safely. However, you will need to take into consideration driving and activities related to your job. For jobs that require physical labor, you may require four months or more to return to work.    Normal Sensations and Findings after Surgery:   PAIN: We do everything possible to make your pain/discomfort level tolerable, but some amount of pain is to be expected.   WARMTH: Mild warmth around the operative site is normal for up to 3 weeks.   REDNESS: Small amount of redness where the sutures enter the skin is normal. If redness worsens or spreads it is important that you contact the office.   DRAINAGE: A small amount is normal for the first 48-72 hours. If wounds continue to drain after this time (requiring multiple gauze changes per day), please contact the office.   NUMBNESS: Around the incision is common.   BRUISING: Is common and often tracks down the arm or leg due to gravity and results in an alarming appearance, but is common and will resolve with time.   FEVER: Low-grade fevers (less than 101.5°F) are common during the first week after surgery. You should drink plenty of fluids and breathe deeply.   CONSTIPATION: Post-operative pain medications as well as immobility can lead to constipation. Please consider taking an over the counter stool softener such as Colace and/or Senna if you experience constipation or if you have a history of constipation.    Follow-Up   A Follow-up appointment should be arranged for 10-14 days after surgery. If one has not  been provided, please call the office to schedule.       NOTIFY US IMMEDIATELY FOR ANY OF THE FOLLOWING:   Most orthopedic surgical procedures are uneventful. However, complications can occur. The following are things to be aware of in the immediate postoperative period.   FEVER - Temperature rises above 101.5°F or associated chills/sweats   WOUND - If you notice drainage more than 4 days after surgery, if the drainage turns yellows and foul smelling, if you need to change gauze multiple times per day, or if sutures become loose.   CARDIOVASCULAR - Chest pain, shortness of breath, palpitations, or fainting spells must be taken seriously. Go to the emergency room (or call 911) immediately for evaluation.   BLOOD CLOTS - Orthopaedic surgery patients are at risk for blood clots. While the risk is higher for lower extremity surgery, even those who have undergone upper extremity surgery are at an increased risk. Please notify Dr. Del Valle if you or someone in your family has had blood clots or any type of known clotting disorder. Signs of blood clots may include calf pain or cramping, diffuse swelling in the leg and foot, or chest pain and shortness of breath. Please call the office or go to the hospital if you recognize any of these symptoms.   NAUSEA - If you have severe vomiting, diarrhea, or constipation, or cannot keep any liquid down   URINARY RETENTION - If you cannot urinate the night after surgery, please go to the Emergency Room.

## 2024-06-20 ENCOUNTER — ANESTHESIA (OUTPATIENT)
Dept: OPERATING ROOM | Facility: HOSPITAL | Age: 89
End: 2024-06-20
Payer: MEDICARE

## 2024-06-20 ENCOUNTER — PHARMACY VISIT (OUTPATIENT)
Dept: PHARMACY | Facility: CLINIC | Age: 89
End: 2024-06-20
Payer: COMMERCIAL

## 2024-06-20 ENCOUNTER — APPOINTMENT (OUTPATIENT)
Dept: RADIOLOGY | Facility: HOSPITAL | Age: 89
End: 2024-06-20
Payer: MEDICARE

## 2024-06-20 ENCOUNTER — HOSPITAL ENCOUNTER (OUTPATIENT)
Facility: HOSPITAL | Age: 89
Setting detail: OUTPATIENT SURGERY
Discharge: HOME | End: 2024-06-20
Attending: STUDENT IN AN ORGANIZED HEALTH CARE EDUCATION/TRAINING PROGRAM | Admitting: STUDENT IN AN ORGANIZED HEALTH CARE EDUCATION/TRAINING PROGRAM
Payer: MEDICARE

## 2024-06-20 ENCOUNTER — ANESTHESIA EVENT (OUTPATIENT)
Dept: OPERATING ROOM | Facility: HOSPITAL | Age: 89
End: 2024-06-20
Payer: MEDICARE

## 2024-06-20 VITALS
RESPIRATION RATE: 17 BRPM | TEMPERATURE: 97.7 F | BODY MASS INDEX: 23.04 KG/M2 | OXYGEN SATURATION: 98 % | HEIGHT: 63 IN | WEIGHT: 130 LBS | SYSTOLIC BLOOD PRESSURE: 138 MMHG | HEART RATE: 66 BPM | DIASTOLIC BLOOD PRESSURE: 78 MMHG

## 2024-06-20 DIAGNOSIS — S52.602A FRACTURE OF RADIUS AND ULNA, DISTAL, LEFT, CLOSED, INITIAL ENCOUNTER: ICD-10-CM

## 2024-06-20 DIAGNOSIS — S52.502A FRACTURE OF RADIUS AND ULNA, DISTAL, LEFT, CLOSED, INITIAL ENCOUNTER: ICD-10-CM

## 2024-06-20 DIAGNOSIS — S52.602A TRAUMATIC CLOSED DISPLACED FRACTURE OF DISTAL END OF LEFT RADIUS AND ULNA, INITIAL ENCOUNTER: Primary | ICD-10-CM

## 2024-06-20 DIAGNOSIS — S52.502A TRAUMATIC CLOSED DISPLACED FRACTURE OF DISTAL END OF LEFT RADIUS AND ULNA, INITIAL ENCOUNTER: Primary | ICD-10-CM

## 2024-06-20 PROCEDURE — 7100000001 HC RECOVERY ROOM TIME - INITIAL BASE CHARGE: Performed by: STUDENT IN AN ORGANIZED HEALTH CARE EDUCATION/TRAINING PROGRAM

## 2024-06-20 PROCEDURE — 2500000004 HC RX 250 GENERAL PHARMACY W/ HCPCS (ALT 636 FOR OP/ED): Performed by: NURSE ANESTHETIST, CERTIFIED REGISTERED

## 2024-06-20 PROCEDURE — 2720000007 HC OR 272 NO HCPCS: Performed by: STUDENT IN AN ORGANIZED HEALTH CARE EDUCATION/TRAINING PROGRAM

## 2024-06-20 PROCEDURE — 7100000009 HC PHASE TWO TIME - INITIAL BASE CHARGE: Performed by: STUDENT IN AN ORGANIZED HEALTH CARE EDUCATION/TRAINING PROGRAM

## 2024-06-20 PROCEDURE — 76000 FLUOROSCOPY <1 HR PHYS/QHP: CPT | Mod: 59

## 2024-06-20 PROCEDURE — C1713 ANCHOR/SCREW BN/BN,TIS/BN: HCPCS | Performed by: STUDENT IN AN ORGANIZED HEALTH CARE EDUCATION/TRAINING PROGRAM

## 2024-06-20 PROCEDURE — 3700000001 HC GENERAL ANESTHESIA TIME - INITIAL BASE CHARGE: Performed by: STUDENT IN AN ORGANIZED HEALTH CARE EDUCATION/TRAINING PROGRAM

## 2024-06-20 PROCEDURE — 3600000004 HC OR TIME - INITIAL BASE CHARGE - PROCEDURE LEVEL FOUR: Performed by: STUDENT IN AN ORGANIZED HEALTH CARE EDUCATION/TRAINING PROGRAM

## 2024-06-20 PROCEDURE — 7100000002 HC RECOVERY ROOM TIME - EACH INCREMENTAL 1 MINUTE: Performed by: STUDENT IN AN ORGANIZED HEALTH CARE EDUCATION/TRAINING PROGRAM

## 2024-06-20 PROCEDURE — 3700000002 HC GENERAL ANESTHESIA TIME - EACH INCREMENTAL 1 MINUTE: Performed by: STUDENT IN AN ORGANIZED HEALTH CARE EDUCATION/TRAINING PROGRAM

## 2024-06-20 PROCEDURE — RXMED WILLOW AMBULATORY MEDICATION CHARGE

## 2024-06-20 PROCEDURE — 25609 OPTX DST RD XART FX/EP SEP3+: CPT | Performed by: STUDENT IN AN ORGANIZED HEALTH CARE EDUCATION/TRAINING PROGRAM

## 2024-06-20 PROCEDURE — 3600000009 HC OR TIME - EACH INCREMENTAL 1 MINUTE - PROCEDURE LEVEL FOUR: Performed by: STUDENT IN AN ORGANIZED HEALTH CARE EDUCATION/TRAINING PROGRAM

## 2024-06-20 PROCEDURE — 2500000001 HC RX 250 WO HCPCS SELF ADMINISTERED DRUGS (ALT 637 FOR MEDICARE OP)

## 2024-06-20 PROCEDURE — 7100000010 HC PHASE TWO TIME - EACH INCREMENTAL 1 MINUTE: Performed by: STUDENT IN AN ORGANIZED HEALTH CARE EDUCATION/TRAINING PROGRAM

## 2024-06-20 PROCEDURE — 2500000004 HC RX 250 GENERAL PHARMACY W/ HCPCS (ALT 636 FOR OP/ED): Performed by: ANESTHESIOLOGY

## 2024-06-20 PROCEDURE — 25609 OPTX DST RD XART FX/EP SEP3+: CPT

## 2024-06-20 PROCEDURE — 2500000004 HC RX 250 GENERAL PHARMACY W/ HCPCS (ALT 636 FOR OP/ED): Mod: JZ

## 2024-06-20 PROCEDURE — 2780000003 HC OR 278 NO HCPCS: Performed by: STUDENT IN AN ORGANIZED HEALTH CARE EDUCATION/TRAINING PROGRAM

## 2024-06-20 DEVICE — PLATE, 2.4MM, VOLAR DISTAL, VA-LCP, 6H HEAD/3H SHAFT, LEFT: Type: IMPLANTABLE DEVICE | Site: WRIST | Status: FUNCTIONAL

## 2024-06-20 DEVICE — SCREW, LOCKING, 2.4MM X 18MM: Type: IMPLANTABLE DEVICE | Site: WRIST | Status: FUNCTIONAL

## 2024-06-20 DEVICE — SCREW, CORTEX SELF TAP W/T8 RECESS 2.7MM X 14MM: Type: IMPLANTABLE DEVICE | Site: WRIST | Status: FUNCTIONAL

## 2024-06-20 DEVICE — SCREW, LOCKING, 2.4MM X 20MM: Type: IMPLANTABLE DEVICE | Site: WRIST | Status: FUNCTIONAL

## 2024-06-20 DEVICE — SCREW, CORTEX SELF TAP W/T8 RECESS 2.7MM X 12MM: Type: IMPLANTABLE DEVICE | Site: WRIST | Status: FUNCTIONAL

## 2024-06-20 RX ORDER — PHENYLEPHRINE HCL IN 0.9% NACL 1 MG/10 ML
SYRINGE (ML) INTRAVENOUS AS NEEDED
Status: DISCONTINUED | OUTPATIENT
Start: 2024-06-20 | End: 2024-06-20

## 2024-06-20 RX ORDER — GABAPENTIN 300 MG/1
300 CAPSULE ORAL ONCE
Status: COMPLETED | OUTPATIENT
Start: 2024-06-20 | End: 2024-06-20

## 2024-06-20 RX ORDER — SODIUM CHLORIDE, SODIUM LACTATE, POTASSIUM CHLORIDE, CALCIUM CHLORIDE 600; 310; 30; 20 MG/100ML; MG/100ML; MG/100ML; MG/100ML
100 INJECTION, SOLUTION INTRAVENOUS CONTINUOUS
Status: DISCONTINUED | OUTPATIENT
Start: 2024-06-20 | End: 2024-06-20 | Stop reason: HOSPADM

## 2024-06-20 RX ORDER — ALBUTEROL SULFATE 0.83 MG/ML
2.5 SOLUTION RESPIRATORY (INHALATION) ONCE AS NEEDED
Status: DISCONTINUED | OUTPATIENT
Start: 2024-06-20 | End: 2024-06-20 | Stop reason: HOSPADM

## 2024-06-20 RX ORDER — ACETAMINOPHEN 500 MG
1000 TABLET ORAL EVERY 8 HOURS PRN
Qty: 60 TABLET | Refills: 1 | Status: SHIPPED | OUTPATIENT
Start: 2024-06-20 | End: 2024-07-10

## 2024-06-20 RX ORDER — OXYCODONE HYDROCHLORIDE 5 MG/1
5 TABLET ORAL EVERY 4 HOURS PRN
Qty: 15 TABLET | Refills: 0 | Status: SHIPPED | OUTPATIENT
Start: 2024-06-20 | End: 2024-06-23

## 2024-06-20 RX ORDER — FAMOTIDINE 10 MG/ML
20 INJECTION INTRAVENOUS ONCE
Status: COMPLETED | OUTPATIENT
Start: 2024-06-20 | End: 2024-06-20

## 2024-06-20 RX ORDER — ONDANSETRON HYDROCHLORIDE 2 MG/ML
4 INJECTION, SOLUTION INTRAVENOUS ONCE AS NEEDED
Status: DISCONTINUED | OUTPATIENT
Start: 2024-06-20 | End: 2024-06-20 | Stop reason: HOSPADM

## 2024-06-20 RX ORDER — OXYCODONE AND ACETAMINOPHEN 5; 325 MG/1; MG/1
1 TABLET ORAL EVERY 4 HOURS PRN
Status: DISCONTINUED | OUTPATIENT
Start: 2024-06-20 | End: 2024-06-20 | Stop reason: HOSPADM

## 2024-06-20 RX ORDER — PROPOFOL 10 MG/ML
INJECTION, EMULSION INTRAVENOUS AS NEEDED
Status: DISCONTINUED | OUTPATIENT
Start: 2024-06-20 | End: 2024-06-20

## 2024-06-20 RX ORDER — ASPIRIN 81 MG/1
81 TABLET ORAL 2 TIMES DAILY
Qty: 30 TABLET | Refills: 0 | Status: SHIPPED | OUTPATIENT
Start: 2024-06-20 | End: 2024-07-05

## 2024-06-20 RX ORDER — LIDOCAINE HYDROCHLORIDE 20 MG/ML
INJECTION, SOLUTION EPIDURAL; INFILTRATION; INTRACAUDAL; PERINEURAL AS NEEDED
Status: DISCONTINUED | OUTPATIENT
Start: 2024-06-20 | End: 2024-06-20

## 2024-06-20 RX ORDER — CELECOXIB 200 MG/1
200 CAPSULE ORAL ONCE
Status: COMPLETED | OUTPATIENT
Start: 2024-06-20 | End: 2024-06-20

## 2024-06-20 RX ORDER — DROPERIDOL 2.5 MG/ML
0.62 INJECTION, SOLUTION INTRAMUSCULAR; INTRAVENOUS ONCE AS NEEDED
Status: DISCONTINUED | OUTPATIENT
Start: 2024-06-20 | End: 2024-06-20 | Stop reason: HOSPADM

## 2024-06-20 RX ORDER — ROPIVACAINE HYDROCHLORIDE 5 MG/ML
INJECTION, SOLUTION EPIDURAL; INFILTRATION; PERINEURAL AS NEEDED
Status: DISCONTINUED | OUTPATIENT
Start: 2024-06-20 | End: 2024-06-20

## 2024-06-20 RX ORDER — DIPHENHYDRAMINE HYDROCHLORIDE 50 MG/ML
12.5 INJECTION INTRAMUSCULAR; INTRAVENOUS ONCE AS NEEDED
Status: DISCONTINUED | OUTPATIENT
Start: 2024-06-20 | End: 2024-06-20 | Stop reason: HOSPADM

## 2024-06-20 RX ORDER — MORPHINE SULFATE 2 MG/ML
2 INJECTION, SOLUTION INTRAMUSCULAR; INTRAVENOUS EVERY 5 MIN PRN
Status: DISCONTINUED | OUTPATIENT
Start: 2024-06-20 | End: 2024-06-20 | Stop reason: HOSPADM

## 2024-06-20 RX ORDER — ACETAMINOPHEN 325 MG/1
975 TABLET ORAL ONCE
Status: COMPLETED | OUTPATIENT
Start: 2024-06-20 | End: 2024-06-20

## 2024-06-20 RX ORDER — FENTANYL CITRATE 50 UG/ML
INJECTION, SOLUTION INTRAMUSCULAR; INTRAVENOUS AS NEEDED
Status: DISCONTINUED | OUTPATIENT
Start: 2024-06-20 | End: 2024-06-20

## 2024-06-20 RX ORDER — HYDRALAZINE HYDROCHLORIDE 20 MG/ML
5 INJECTION INTRAMUSCULAR; INTRAVENOUS EVERY 30 MIN PRN
Status: DISCONTINUED | OUTPATIENT
Start: 2024-06-20 | End: 2024-06-20 | Stop reason: HOSPADM

## 2024-06-20 RX ORDER — ONDANSETRON 4 MG/1
4 TABLET, FILM COATED ORAL EVERY 8 HOURS PRN
Qty: 20 TABLET | Refills: 0 | Status: SHIPPED | OUTPATIENT
Start: 2024-06-20 | End: 2024-06-27

## 2024-06-20 RX ORDER — LIDOCAINE HYDROCHLORIDE 10 MG/ML
0.1 INJECTION, SOLUTION EPIDURAL; INFILTRATION; INTRACAUDAL; PERINEURAL ONCE
Status: DISCONTINUED | OUTPATIENT
Start: 2024-06-20 | End: 2024-06-20 | Stop reason: HOSPADM

## 2024-06-20 RX ORDER — LABETALOL HYDROCHLORIDE 5 MG/ML
5 INJECTION, SOLUTION INTRAVENOUS ONCE AS NEEDED
Status: DISCONTINUED | OUTPATIENT
Start: 2024-06-20 | End: 2024-06-20 | Stop reason: HOSPADM

## 2024-06-20 RX ORDER — CEFAZOLIN SODIUM 2 G/100ML
2 INJECTION, SOLUTION INTRAVENOUS ONCE
Status: COMPLETED | OUTPATIENT
Start: 2024-06-20 | End: 2024-06-20

## 2024-06-20 RX ORDER — ONDANSETRON HYDROCHLORIDE 2 MG/ML
INJECTION, SOLUTION INTRAVENOUS AS NEEDED
Status: DISCONTINUED | OUTPATIENT
Start: 2024-06-20 | End: 2024-06-20

## 2024-06-20 RX ORDER — MEPERIDINE HYDROCHLORIDE 25 MG/ML
12.5 INJECTION INTRAMUSCULAR; INTRAVENOUS; SUBCUTANEOUS EVERY 10 MIN PRN
Status: DISCONTINUED | OUTPATIENT
Start: 2024-06-20 | End: 2024-06-20 | Stop reason: HOSPADM

## 2024-06-20 SDOH — HEALTH STABILITY: MENTAL HEALTH: CURRENT SMOKER: 0

## 2024-06-20 ASSESSMENT — PAIN - FUNCTIONAL ASSESSMENT: PAIN_FUNCTIONAL_ASSESSMENT: 0-10

## 2024-06-20 ASSESSMENT — PAIN SCALES - GENERAL
PAINLEVEL_OUTOF10: 0 - NO PAIN
PAIN_LEVEL: 0

## 2024-06-20 NOTE — OP NOTE
Left distal radius open reduction internal fixation (L) Operative Note     Date: 2024  OR Location: [facility]    Name: Gabrielle Turner, : 1935, Age: 89 y.o., MRN: [id number], Sex: female    Diagnosis  Pre-op Diagnosis     * Traumatic closed displaced fracture of distal end of left radius and ulna, initial encounter [S52.502A, S52.602A] Post-op Diagnosis     * Traumatic closed displaced fracture of distal end of left radius and ulna, initial encounter [S52.502A, S52.602A]     Procedures  Left distal radius open reduction internal fixation (22 modifier)    Surgeons      * Nelson Del Valle - Primary    Resident/Fellow/Other Assistant:  Surgeons and Role:     * Gabrielle Lopresti, PA-C - [facility] Assist    Procedure Summary  Anesthesia: Regional  ASA: III  Anesthesia Staff: CRNA: MEERA Clifton-CRNA  Estimated Blood Loss: 5 mL  Intra-op Medications: * Intraprocedure medication information is unavailable because the case start and end events have not been set *           Anesthesia Record               Intraprocedure I/O Totals          Intake    Dexmedetomidine 0.00 mL    The total shown is the total volume documented since Anesthesia Start was filed.    lactated Ringer's infusion 600.00 mL    Total Intake 600 mL          Specimen: No specimens collected     Staff:   Floydulator: Tyson Israelub Person: Marilia Israelub Person: Elizabeth     Drains and/or Catheters: * None in log *    Tourniquet Times:     Total Tourniquet Time Documented:  Arm - Upper (Left) - 48 minutes  Total: Arm - Upper (Left) - 48 minutes      Implants:  Implants       Type Name Action Serial No.      Screw PLATE, 2.4MM, VOLAR DISTAL, VA-LCP, 6H HEAD/3H SHAFT, LEFT - [id number] Implanted      Screw SCREW, CORTEX SELF TAP W/T8 RECESS 2.7MM X 14MM - [id number] Implanted      Screw SCREW, LOCKING, 2.4MM X 20MM - [id number] Implanted      Screw SCREW, LOCKING, 2.4MM X 18MM - [id number] Implanted      Screw SCREW, CORTEX SELF TAP W/T8 RECESS  2.7MM X 12MM - [id number] Implanted               Findings: Comminuted intra-articular left distal radius fracture    Indications: Gabrielle Turner is an 89 y.o. female who is having surgery for a comminuted and displaced left distal radius fracture.  I reviewed the patient's x-rays with her and her family.  She had significant displacement of her distal radius fracture with loss of normal volar tilt and shortening.  I discussed operative and nonoperative management and after long discussion, the family elected to proceed with surgical intervention with a left distal radius open reduction internal fixation.  I thoroughly explained the risks and benefits as well as the expected postoperative timeline for the proposed procedure versus nonoperative management. Risks of this procedure include but are not limited to bleeding, infection, nerve injury, DVT and failure of repair or implant.  The patient expressed understanding and wished to proceed with surgical intervention.  All questions were answered. They were consented to the above procedure at bedside.    The patient was seen in the preoperative area. The risks, benefits, complications, treatment options, non-operative alternatives, expected recovery and outcomes were discussed with the patient. The possibilities of reaction to medication, pulmonary aspiration, injury to surrounding structures, bleeding, recurrent infection, the need for additional procedures, failure to diagnose a condition, and creating a complication requiring transfusion or operation were discussed with the patient. The patient concurred with the proposed plan, giving informed consent.  The site of surgery was properly noted/marked if necessary per policy. The patient has been actively warmed in preoperative area. Preoperative antibiotics have been ordered and given within 1 hours of incision. Venous thrombosis prophylaxis have been ordered including bilateral sequential compression  devices    Procedure Details:   The patient seen in the preoperative holding area and the operative site was identified and confirmed by the patient.  The left upper extremity was signed with my initials and again confirmed by the patient.  The patient was consented in the holding area. A regional anesthetic was administered by the anesthesia team.    The patient was brought back to the operating room and placed on the operating room table.  After smooth induction of general endotracheal anesthesia by the anesthesia team, the patient was positioned supine with the operative extremity on a hand table.  The patient was belted and posted and all bony prominences were padded.  Sequential compression devices were placed on the bilateral lower extremities for DVT prophylaxis.  The operative extremity was prepped and draped in usual sterile fashion.  Preoperative antibiotics were administered by the anesthesia team prior to incision.  A preoperative timeout was performed confirming the correct patient, site, side and procedure to be performed.  All in the room were in agreement.    A standard volar approach to the distal radius was performed.  The FCR tendon was palpated and a longitudinal incision along the length of the FCR tendon was made sharply through the skin using a 15 blade down to the FCR tendon sheath.  The tendon sheath was incised and the FCR tendon was retracted ulnarly.  The floor of the FCR tendon sheath was then incised and the FPL muscle belly was also retracted ulnarly revealing the pronator quadratus.  The pronator quadratus was taken down in an L shaped fashion and bluntly elevated off of the volar aspect of the distal radius.  Careful attention was paid not to violate the volar capsule and carpal ligaments.  Careful attention was paid to avoid injury to the radial artery throughout the case.  The brachioradialis was released off of the radial styloid.    The distal radius fracture was then visualized.   The distal fragment was mobile and additional mobility was achieved using an elevator and curette.  A scalpel was then used to clean the fracture edges of any callus that was forming as well as any muscle that got trapped into the fracture site.  The fracture was then reduced under direct visualization into an anatomic position and provisionally secured in place using a smooth K wire from the radial styloid into the shaft.  A Synthes 3-hole distal radius volar locking plate was chosen as the appropriate plate.  Size and position of the plate was confirmed using fluoroscopy.  The plate was then fixed to the shaft using a single 2.7 cortical screw and a smooth K wire and appropriate positioning was again confirmed using fluoroscopy.  At this point the appropriate length distal variable angle 2.4 locking screws were placed in the distal fragment and the remaining shaft 2.7 cortical screws were drilled and placed.  Fluoroscopic images were taken throughout to ensure the screws did not violate the joint.    Final fluoroscopic images demonstrated anatomic reduction of the distal radius with restored length and volar tilt.    The wound was thoroughly irrigated.  The pronator quadratus was reapproximated with interrupted 0 Vicryl sutures followed by deep dermal layer with 2-0 Vicryl sutures and a running 3-0 Monocryl on the skin.  Steri-Strips were placed across the incision on top.  The wound was dressed with a sterile dry dressing and a well-padded volar resting splint.  The patient was placed in sling as they had a block preoperatively.    The patient was awoken from anesthesia and taken to the recovery room in stable condition.    Instructions:  The patient will remain in the splint postoperatively.  They will be provided with my postoperative distal radius ORIF protocol and an occupational therapy prescription will be provided to the patient at her first postoperative visit at 2 weeks.  The patient will return to see  me in 2 weeks with repeat x-rays of the left wrist.    Gabrielle LoPresti, PA-C was present for the entire case.  Her assistance was necessary and critical to the successful completion of the procedure.  She provided skilled assistance with arm positioning, implant insertion, retraction and wound closure.  A qualified assistant was not available to perform her portion of the case.    A 22 modifier was added due to the patient's frail bone which added additional case complexity and operative time      Complications:  None; patient tolerated the procedure well.    Disposition: PACU - hemodynamically stable.  Condition: stable     Attending Attestation: I was present and scrubbed for the entire procedure.    Nelson Del Valle  Phone Number: [phone]

## 2024-06-20 NOTE — ANESTHESIA PROCEDURE NOTES
"Peripheral Block    Patient location during procedure: pre-op  Start time: 6/20/2024 7:57 AM  End time: 6/20/2024 8:04 AM  Reason for block: at surgeon's request and post-op pain management  Staffing  Performed: CRNA   Authorized by: ADAM Doss    Performed by: ADAM Doss  Preanesthetic Checklist  Completed: patient identified, IV checked, site marked, risks and benefits discussed, surgical consent, monitors and equipment checked, pre-op evaluation and timeout performed   Timeout performed at: 6/20/2024 7:57 AM  Peripheral Block  Patient position: sitting  Prep: ChloraPrep  Patient monitoring: continuous pulse ox, cardiac monitor and heart rate  Block type: supraclavicular  Laterality: left  Injection technique: single-shot  Guidance: ultrasound guided  Local infiltration: ropivacaine  Infiltration strength: 0.5 %  Dose: 15 mL  Needle  Needle type: 50mm.   Needle localization: anatomical landmarks and ultrasound guidance  Assessment  Injection assessment: negative aspiration for heme, no paresthesia on injection, incremental injection and local visualized surrounding nerve on ultrasound  Paresthesia pain: none  Heart rate change: no  Slow fractionated injection: yes  Additional Notes  Anesthesia consult was placed by Dr. Del Valle______for post procedural analgesia.  The patient's chart was reviewed and they were deemed an appropriate candidate for the procedure. The patient was educated in detail on the risks, benefits, and alternatives to the block including but not limited to: temporary or permanent nerve damage, bleeding, infection, damage to surrounding tissues, possible block failure and the potential for local anesthesia toxicity syndrome.  The patient expressed understanding and all questions were answered prior to the initiation of the procedure.  Informed consent was also signed by the patient and laterality determined per institutional policy.  A formal \"time out \"consistent " with the institutions rules and regulations was performed by the anesthesia provider and appropriate RN.     Procedure  The patient was placed in the sitting position. The LEFT side was marked and skin prep applied and allowed to dry. Proper monitors were applied with oxygen.  Sedation was provided by administering     Fentanyl __50__mcg IV    A high frequency linear probe with probe cover and was placed over lateral neck and subclavian artery, brachial plexus identified using sterile coupling gel. The brachial plexus was identified posterior to the subclavian artery as well as the pleura. An ECHOGENIC BLOCK NEEDLE was then advanced maintaining an in-plane visualization of the procedure, under ultrasound guidance from lateral to medial to come adjacent to, but avoiding contact to the brachial plexus itself. Upon negative aspiration, 5ml 2% lidocaine 15 mls 0.5% Ropivacaine with 4 mg decadron preservative free was administered safely and cautiously between the muscle planes. Aspiration every 3-5mls was done to avoid potential intravascular injection.  All injections were done without resistance and free of blood.  All relevant vasculature was carefully avoided throughout the procedure. The patient tolerated the procedure well without report of intense pain, tinnitus, metallic taste or circumoral numbness.  The block was then evaluated a few minutes and appeared to be functioning appropriately.

## 2024-06-20 NOTE — ANESTHESIA PREPROCEDURE EVALUATION
Patient: Gabrielle Turner    Procedure Information       Date/Time: 06/20/24 0900    Procedure: Left distal radius open reduction internal fixation (Left: Wrist) - 1hr    Location: [facility] / [facility]    Surgeons: Nelson Del Valle MD            Relevant Problems   Anesthesia (within normal limits)      Cardiac   (+) Benign essential hypertension   (+) Hyperlipidemia, unspecified   (+) Peripheral vascular disease, unspecified (CMS-HCC)      Neuro   (+) Anxiety state   (+) Mild late onset Alzheimer's dementia without behavioral disturbance, psychotic disturbance, mood disturbance, or anxiety (Multi)      GI   (+) Gastroesophageal reflux disease without esophagitis      Endocrine   (+) Adult hypothyroidism      ID   (+) Posttraumatic wound infection      Skin   (+) Squamous cell carcinoma of skin of face       Clinical information reviewed:   Tobacco  Allergies  Meds  Problems  Med Hx  Surg Hx   Fam Hx  Soc   Hx        NPO Detail:  NPO/Void Status  Date of Last Liquid: 06/19/24  Time of Last Liquid: 2000  Date of Last Solid: 06/19/24  Time of Last Solid: 1800         Physical Exam    Airway  Mallampati: II  TM distance: >3 FB  Neck ROM: full     Cardiovascular - normal exam     Dental - normal exam     Pulmonary - normal exam     Abdominal - normal exam         Anesthesia Plan    History of general anesthesia?: yes  History of complications of general anesthesia?: no    ASA 3     general and regional   (GA  Right Supraclavicular Nerve Block)  The patient is not a current smoker.    intravenous induction   Postoperative administration of opioids is intended.  Anesthetic plan and risks discussed with patient.    Plan discussed with CRNA.

## 2024-06-20 NOTE — BRIEF OP NOTE
Date: 2024  OR Location: [facility]    Name: Gabrielle Turner, : 1935, Age: 89 y.o., MRN: [id number], Sex: female    Diagnosis  Pre-op Diagnosis     * Traumatic closed displaced fracture of distal end of left radius and ulna, initial encounter [S52.502A, S52.602A] Post-op Diagnosis     * Traumatic closed displaced fracture of distal end of left radius and ulna, initial encounter [S52.502A, S52.602A]     Procedures  Left distal radius open reduction internal fixation (22 modifier)    Surgeons      * Nelson Del Valle - Primary    Resident/Fellow/Other Assistant:  Surgeons and Role:     * Gabrielle Lopresti, PA-C - [facility] Assist    Procedure Summary  Anesthesia: Regional  ASA: III  Anesthesia Staff: CRNA: MEERA Clifton-CRNA  Estimated Blood Loss: 5mL  Intra-op Medications: * Intraprocedure medication information is unavailable because the case start and end events have not been set *           Anesthesia Record               Intraprocedure I/O Totals          Intake    Dexmedetomidine 0.00 mL    The total shown is the total volume documented since Anesthesia Start was filed.    lactated Ringer's infusion 600.00 mL    Total Intake 600 mL          Specimen: No specimens collected     Staff:   Circulator: Tyson  Scrub Person: Marilia Israelub Person: Elizabeth    Findings: Comminuted intra-articular left distal radius fracture    Complications:  None; patient tolerated the procedure well.     Disposition: PACU - hemodynamically stable.  Condition: stable  Specimens Collected: No specimens collected  Attending Attestation: I was present and scrubbed for the entire procedure.    Nelson Del Valle  Phone Number: [phone]

## 2024-06-20 NOTE — ANESTHESIA POSTPROCEDURE EVALUATION
Patient: Gabrielle Turner    Procedure Summary       Date: 06/20/24 Room / Location: [facility] / [facility]    Anesthesia Start: 0855 Anesthesia Stop: 1021    Procedure: Left distal radius open reduction internal fixation (Left: Wrist) Diagnosis:       Traumatic closed displaced fracture of distal end of left radius and ulna, initial encounter      (Traumatic closed displaced fracture of distal end of left radius and ulna, initial encounter [S52.502A, S52.602A])    Surgeons: Nelson Del Valle MD Responsible Provider: ADAM Clifton    Anesthesia Type: general, regional ASA Status: 3            Anesthesia Type: general, regional    Vitals Value Taken Time   /79 06/20/24 1111   Temp 36.5 °C (97.7 °F) 06/20/24 1050   Pulse 69 06/20/24 1117   Resp 11 06/20/24 1118   SpO2 54 % 06/20/24 1119   Vitals shown include unfiled device data.    Anesthesia Post Evaluation    Patient location during evaluation: PACU  Patient participation: complete - patient participated  Level of consciousness: awake  Pain score: 0  Pain management: adequate  Airway patency: patent  Cardiovascular status: acceptable  Respiratory status: acceptable  Hydration status: acceptable  Postoperative Nausea and Vomiting: none      No notable events documented.

## 2024-06-20 NOTE — ANESTHESIA PROCEDURE NOTES
Airway  Date/Time: 6/20/2024 9:02 AM  Urgency: elective    Airway not difficult    Staffing  Performed: CRNA   Authorized by: MEERA Clifton-JOSE    Performed by: MEERA Clifton-JOSE  Patient location during procedure: OR    Indications and Patient Condition  Indications for airway management: anesthesia  Spontaneous Ventilation: absent  Sedation level: deep  Preoxygenated: yes  Patient position: sniffing  Mask difficulty assessment: 0 - not attempted    Final Airway Details  Final airway type: supraglottic airway      Successful airway: Supraglottic airway: Igel.     Number of attempts at approach: 1

## 2024-06-21 ASSESSMENT — PAIN SCALES - GENERAL: PAINLEVEL_OUTOF10: 0 - NO PAIN

## 2024-07-03 ENCOUNTER — HOSPITAL ENCOUNTER (OUTPATIENT)
Dept: RADIOLOGY | Facility: CLINIC | Age: 89
Discharge: HOME | End: 2024-07-03
Payer: MEDICARE

## 2024-07-03 ENCOUNTER — OFFICE VISIT (OUTPATIENT)
Dept: ORTHOPEDIC SURGERY | Facility: CLINIC | Age: 89
End: 2024-07-03
Payer: MEDICARE

## 2024-07-03 ENCOUNTER — APPOINTMENT (OUTPATIENT)
Dept: ORTHOPEDIC SURGERY | Facility: CLINIC | Age: 89
End: 2024-07-03
Payer: MEDICARE

## 2024-07-03 VITALS — HEIGHT: 63 IN | WEIGHT: 130 LBS | BODY MASS INDEX: 23.04 KG/M2

## 2024-07-03 DIAGNOSIS — S52.502A TRAUMATIC CLOSED DISPLACED FRACTURE OF DISTAL END OF LEFT RADIUS AND ULNA, INITIAL ENCOUNTER: ICD-10-CM

## 2024-07-03 DIAGNOSIS — S52.602A TRAUMATIC CLOSED DISPLACED FRACTURE OF DISTAL END OF LEFT RADIUS AND ULNA, INITIAL ENCOUNTER: ICD-10-CM

## 2024-07-03 PROCEDURE — 1159F MED LIST DOCD IN RCRD: CPT

## 2024-07-03 PROCEDURE — 73110 X-RAY EXAM OF WRIST: CPT | Mod: LEFT SIDE | Performed by: RADIOLOGY

## 2024-07-03 PROCEDURE — L3908 WHO COCK-UP NONMOLDE PRE OTS: HCPCS | Performed by: STUDENT IN AN ORGANIZED HEALTH CARE EDUCATION/TRAINING PROGRAM

## 2024-07-03 PROCEDURE — 99024 POSTOP FOLLOW-UP VISIT: CPT

## 2024-07-03 PROCEDURE — 1123F ACP DISCUSS/DSCN MKR DOCD: CPT

## 2024-07-03 PROCEDURE — 1160F RVW MEDS BY RX/DR IN RCRD: CPT

## 2024-07-03 PROCEDURE — 73110 X-RAY EXAM OF WRIST: CPT | Mod: LT

## 2024-07-03 ASSESSMENT — PAIN - FUNCTIONAL ASSESSMENT: PAIN_FUNCTIONAL_ASSESSMENT: NO/DENIES PAIN

## 2024-07-03 NOTE — PROGRESS NOTES
PRIMARY CARE PHYSICIAN: Allyn Solis MD    ORTHOPAEDIC POSTOPERATIVE VISIT    ASSESSMENT & PLAN    Impression: 89 y.o. female 2 week postop s/p Left distal radius open reduction internal fixation     Plan:   Overall Gabrielle is doing well. She has minimal to no pain. She was transitioned to a cock up wrist splint today. She will wear this splint at all times except for hygiene and occupational therapy. She was provided a referral to occupational therapy today to work on gentle wrist range of motion. Gabrielle will follow up with us in 4 weeks with x-rays of the left wrist.     I reviewed the intraoperative findings with the patient and answered all their questions. I reviewed their postoperative timeline and plan with them. They understand the postoperative precautions and the treatment plan going forward.     Follow-Up: Patient will follow-up in 4 weeks with x-rays of left wrist    At the end of the visit, all questions were answered in full. The patient is in agreement with the plan and recommendations. They will call the office with any questions/concerns.    Note dictated with Frequency software. Completed without full typed error editing and sent to avoid delay.    SUBJECTIVE  CHIEF COMPLAINT: Post-op       HPI: Gabrielle Turner is a 89 y.o. patient who is now 2 week postop status post Left distal radius open reduction internal fixation. XR done today. Pt is doing very well. She reports no pain. She presents today with her son-in-law. No concerns at this time.     REVIEW OF SYSTEMS  Constitutional: See HPI for pain assessment, No significant weight loss, recent trauma  Cardiovascular: No chest pain, shortness of breath  Respiratory: No difficulty breathing, cough  Gastrointestinal: No nausea, vomiting, diarrhea, constipation  Musculoskeletal: Noted in HPI, positive for pain, restricted motion, stiffness  Integumentary: No rashes, easy bruising, redness   Neurological: no numbness or tingling  in extremities, no gait disturbances   Psychiatric: No mood changes, memory changes, social issues  Heme/Lymph: no excessive swelling, easy bruising, excessive bleeding  ENT: No hearing changes  Eyes: No vision changes    CURRENT MEDICATIONS:   Current Outpatient Medications   Medication Sig Dispense Refill    acetaminophen (Tylenol) 500 mg tablet Take 2 tablets (1,000 mg) by mouth every 8 hours if needed for mild pain (1 - 3) for up to 20 days. 60 tablet 1    amLODIPine (Norvasc) 5 mg tablet Take 1 tablet (5 mg) by mouth once daily. as directed 90 tablet 3    aspirin 81 mg EC tablet Take 1 tablet (81 mg) by mouth 2 times a day for 15 days. 30 tablet 0    calcium carbonate-vitamin D3 600 mg-20 mcg (800 unit) tablet Take 1 tablet by mouth once daily.      cholecalciferol (Vitamin D-3) 50 mcg (2,000 unit) capsule Take by mouth.      citalopram (CeleXA) 40 mg tablet Take 1 tablet (40 mg) by mouth once daily. 90 tablet 3    docusate sodium (Colace) 100 mg capsule Take 1 capsule (100 mg) by mouth once daily at bedtime.      donepezil (Aricept) 10 mg tablet Take 1 tablet by mouth at bedtime 90 tablet 0    levothyroxine (Synthroid, Levoxyl) 75 mcg tablet Take 1 tablet (75 mcg) by mouth once daily. as directed 90 tablet 3    metoprolol succinate XL (Toprol-XL) 25 mg 24 hr tablet Take 1 tablet (25 mg) by mouth once daily. Do not crush or chew. 90 tablet 3    omega-3 fatty acids-fish oil 300-1,000 mg capsule Take 2 capsules (2,000 mg) by mouth once daily.      omeprazole (PriLOSEC) 20 mg DR capsule TAKE ONE CAPSULE BY MOUTH EVERY DAY 90 capsule 0    simvastatin (Zocor) 20 mg tablet Take 1 tablet (20 mg) by mouth once daily. 90 tablet 3    tolterodine LA (Detrol LA) 2 mg 24 hr capsule Take 1 capsule (2 mg) by mouth once daily. (Patient not taking: Reported on 6/20/2024) 90 capsule 3     No current facility-administered medications for this visit.        OBJECTIVE    PHYSICAL EXAM      6/20/2024    10:20 AM 6/20/2024    10:30  AM 6/20/2024    10:40 AM 6/20/2024    10:50 AM 6/20/2024    11:00 AM 6/20/2024    11:10 AM 6/20/2024    11:20 AM   Vitals   Systolic 133 132 134 122 131 130 138   Diastolic 87 81 80 77 81 79 78   Heart Rate 65 67 68 63 65 65 66   Temp    36.5 °C (97.7 °F)      Resp 15 15 17 17 14 20 17      There is no height or weight on file to calculate BMI.    General: Well-appearing, no acute distress    Skin intact bilateral upper and lower extremities  No erythema  No warmth    Detailed examination of the left wrist and hand demonstrates:  Surgical incision(s) healing well  No erythema or warmth  No drainage  No ecchymosis  Resolving swelling, minimal  Wrist range of motion: gentle wrist range of motion intact  Able to make a loose fist without rotational abnormality  Upper extremity motor grossly intact  C5-T1 sensation intact bilaterally  2+ radial pulses bilaterally  Warm and well-perfused, brisk capillary refill    IMAGING:   X-ray views of the left wrist reviewed by me demonstrate a distal radius open reduction internal fixation with improved alignment and hardware in the appropriate position without evidence of hardware complication.

## 2024-07-18 ENCOUNTER — EVALUATION (OUTPATIENT)
Dept: OCCUPATIONAL THERAPY | Facility: HOSPITAL | Age: 89
End: 2024-07-18
Payer: MEDICARE

## 2024-07-18 DIAGNOSIS — S52.502A: ICD-10-CM

## 2024-07-18 DIAGNOSIS — M25.632 DECREASED RANGE OF MOTION OF LEFT WRIST: Primary | ICD-10-CM

## 2024-07-18 DIAGNOSIS — S52.602A: ICD-10-CM

## 2024-07-18 DIAGNOSIS — R53.1 WEAKNESS: ICD-10-CM

## 2024-07-18 PROCEDURE — 97110 THERAPEUTIC EXERCISES: CPT | Mod: GO | Performed by: OCCUPATIONAL THERAPIST

## 2024-07-18 PROCEDURE — 97165 OT EVAL LOW COMPLEX 30 MIN: CPT | Mod: GO | Performed by: OCCUPATIONAL THERAPIST

## 2024-07-18 ASSESSMENT — ENCOUNTER SYMPTOMS
OCCASIONAL FEELINGS OF UNSTEADINESS: 0
DEPRESSION: 0
LOSS OF SENSATION IN FEET: 0

## 2024-07-18 ASSESSMENT — PAIN SCALES - GENERAL: PAINLEVEL_OUTOF10: 5 - MODERATE PAIN

## 2024-07-18 ASSESSMENT — PAIN - FUNCTIONAL ASSESSMENT: PAIN_FUNCTIONAL_ASSESSMENT: 0-10

## 2024-07-18 NOTE — PROGRESS NOTES
Occupational Therapy    Evaluation/Treatment    Patient Name: Gabrielle Turner  MRN: [id number]  : 1935  Today's Date: 2024     Time Calculation  Start Time: 130  Stop Time: 220  Time Calculation (min): 50 min  Visit Number: 1  Therapeutic Procedure Codes:  OT Evaluation Time Entry  OT Evaluation (Low) Time Entry: 30  OT Therapeutic Procedures Time Entry  Therapeutic Exercise Time Entry: 20    Subjective   Current Problem:  1. Decreased range of motion of left wrist        2. Traumatic closed displaced fracture of distal end of left radius and ulna, initial encounter  Referral to Occupational Therapy    Follow Up In Occupational Therapy      3. Weakness          General:   Pt fell back and caught herself with her hands on . Pt then had surgery . Pt presenting to OT with caregiver who is her daughters friend who assisted with evaluation questions. Pt reports limited shoulder movement from previous injury. Pt and caregiver report that she had a fall within the last 3 days with no further evaluation. Pt reports pain in shoulder is 5/10. Recommendation made to get shoulder/L UE evaluated from physician. Pt currently wearing a splint on L wrist provided from Dr. Smith. Pt reports she has no pain in her wrist.           Precautions:  STEADI Fall Risk Score (The score of 4 or more indicates an increased risk of falling): 7  I have reviewed patients medical history form.     Pain:  Pain Assessment  Pain Assessment: 0-10  0-10 (Numeric) Pain Score: 5 - Moderate pain  Pain Location: Shoulder  Pain Orientation: Left      Objective   Home Living:  Pt lives with daughter Lashawn and Carries friend who is assisting with caregiving, laundry, cooking. Lashawn has health issues that prevent full assistance at home.      Prior Function:  Pt had home health aide that came to help with bathing and grooming.      ADL:     Pt requires moderate assistance to complete ADL/IADLs tasks safely.     Sensation:  None  reported    Outcome Measures:   Quickdash Scores: 63.64%    Therapeutic exercises/activities:  DOS: 6/17/24 Post-op: 4 Weeks 3 days   Re-check due on: 8/17/24    Exercises: Reps:   AROM 5 finger position 1x5    5 thumb position 1x5    Wrist flex/ext/UD/RD 1x5       Activities:                    Modalities:                    Manual:                    Functional review:     Completed on: 7/18/24 [facility]     Measurements:       Wrist Measurements:  WFL unless documented below   Flexion  Extension Radial Dev Ulnar Dev Supination Pronation   Right         Left 15 35 25 40 40 70        OP EDUCATION:  Education  Individual(s) Educated: Patient, Caregiver  Education Provided: Anatomy & Physiology, Risk and benefits of OT discussed with patient or other, POC discussed and agreed upon, Fall precautons, Diagnosis & Precautions  Home Program: [facility]    Assessment:        Pt is a 89 who presents to this facility with performance deficits in ROM, pain, and strength limiting ability to complete ADL and IADL tasks. Pt  provided with HEP including 5 finger position, 5 thumb position, & wrist ROM. Handouts provided to pt. Pt required Min tactile/visual/verbal cueing to complete exercises. Caregiver trained on exercises and verbally stated understanding. Handouts provided to the patient and caregiver with instructions to complete 5 reps of each exercise 4-8x daily. Pt presenting to OT tx with no pain in wrist, however states she has pain in her shoulder from a recent fall. Pt and caregiver advised to go to urgent care facility/ER for further assessment of L shoulder. Pt demonstrating limited wrist flexion, extension and reports no pain at rest and minimal discomfort throughout exercises this date. Pt requiring Max A to remove wrist brace to complete exercises. Pt will required caregiver assistance to complete HEP at home, caregiver trained and agreed to HEP this date. Pt and caregiver hesitant to schedule further appointments, wishes to  do exercises on own at home.     Plan:        Pt to be seen 1-2 x per week for 12 weeks.  Occupational therapy intervention plan to include education/instruction, hot pack, ultrasound, manual therapy, orthotic fitting/training, self-care/home management, therapeutic exercises, therapeutic activities, and home program.     Goals:    Active       OT Goals       OT Goal 1       Start:  07/18/24    Expected End:  10/10/24       LTG - Patient will indicate/ demonstrate the ability to resume all preinjury ADLs and IADLs without significant limits secondary to decreased ROM, decreased strength and/or pain as indicated by Quickdash score of less than 20.          OT Goal 2       Start:  07/18/24    Expected End:  10/10/24       Patient will demonstrate a progressive increase in ROM as appropriate with L wrist to be within 5-10 degrees of R wrist to help patient resume normal ADL and IADL function.          OT Goal 3       Start:  07/18/24    Expected End:  10/10/24       Pt will demonstrate increased  strength as appropriate after measurements indicated with the L  to be greater than or equal to 80% of the R to help patient resume ADLs and IADLs.          OT Goal 4       Start:  07/18/24    Expected End:  10/10/24       Develop and issue HEP to help maximize ROM, strength and tolerance to help maximize return to all pre-onset activities.          OT Goal 5       Start:  07/18/24    Expected End:  10/10/24       Pain to be less than or equal to 2/10 with greater than or equal to 20 minutes activity.

## 2024-07-29 ENCOUNTER — APPOINTMENT (OUTPATIENT)
Dept: ORTHOPEDIC SURGERY | Facility: CLINIC | Age: 89
End: 2024-07-29
Payer: MEDICARE

## 2024-07-29 ENCOUNTER — HOSPITAL ENCOUNTER (OUTPATIENT)
Dept: RADIOLOGY | Facility: CLINIC | Age: 89
Discharge: HOME | End: 2024-07-29
Payer: MEDICARE

## 2024-07-29 VITALS — HEIGHT: 63 IN | BODY MASS INDEX: 23.04 KG/M2 | WEIGHT: 130 LBS

## 2024-07-29 DIAGNOSIS — S52.502A TRAUMATIC CLOSED DISPLACED FRACTURE OF DISTAL END OF LEFT RADIUS AND ULNA, INITIAL ENCOUNTER: ICD-10-CM

## 2024-07-29 DIAGNOSIS — S52.602A TRAUMATIC CLOSED DISPLACED FRACTURE OF DISTAL END OF LEFT RADIUS AND ULNA, INITIAL ENCOUNTER: ICD-10-CM

## 2024-07-29 PROCEDURE — 1159F MED LIST DOCD IN RCRD: CPT | Performed by: STUDENT IN AN ORGANIZED HEALTH CARE EDUCATION/TRAINING PROGRAM

## 2024-07-29 PROCEDURE — 73110 X-RAY EXAM OF WRIST: CPT | Mod: LT

## 2024-07-29 PROCEDURE — 1160F RVW MEDS BY RX/DR IN RCRD: CPT | Performed by: STUDENT IN AN ORGANIZED HEALTH CARE EDUCATION/TRAINING PROGRAM

## 2024-07-29 PROCEDURE — 99024 POSTOP FOLLOW-UP VISIT: CPT | Performed by: STUDENT IN AN ORGANIZED HEALTH CARE EDUCATION/TRAINING PROGRAM

## 2024-07-29 PROCEDURE — 1123F ACP DISCUSS/DSCN MKR DOCD: CPT | Performed by: STUDENT IN AN ORGANIZED HEALTH CARE EDUCATION/TRAINING PROGRAM

## 2024-07-29 PROCEDURE — 73110 X-RAY EXAM OF WRIST: CPT | Mod: LEFT SIDE | Performed by: RADIOLOGY

## 2024-07-29 ASSESSMENT — PAIN - FUNCTIONAL ASSESSMENT: PAIN_FUNCTIONAL_ASSESSMENT: NO/DENIES PAIN

## 2024-07-29 NOTE — PROGRESS NOTES
PRIMARY CARE PHYSICIAN: Allyn Solis MD    ORTHOPAEDIC POSTOPERATIVE VISIT    ASSESSMENT & PLAN    Impression: 89 y.o. female 5.5 week postop s/p Left distal radius open reduction internal fixation     Plan:   Overall Gabrielle is doing very well. She has no pain and has been wearing her cock up wrist splint as instructed. She may discontinue with the splint in 1 week. She will continue working on her exercises from occupational therapy. She will follow up with us in 6 weeks with x-rays of left wrist.    I reviewed their postoperative timeline and plan with them. They understand the postoperative precautions and the treatment plan going forward.     Follow-Up: Patient will follow-up in 6 weeks with x-rays of left wrist    At the end of the visit, all questions were answered in full. The patient is in agreement with the plan and recommendations. They will call the office with any questions/concerns.    Note dictated with Elli software. Completed without full typed error editing and sent to avoid delay.    SUBJECTIVE  CHIEF COMPLAINT: Post-op       HPI: Gabrielle Turner is a 89 y.o. patient who is now 5.5 week postop status post Left distal radius open reduction internal fixation. XR done today. Overall Gabrielle is doing very well. She has minimal to no pain and has been wearing her splint as instructed. She has attended one session of occupational therapy and has been doing her exercises at home. She is doing well and has no concerns. She is accompanied today by her son-in-law.     REVIEW OF SYSTEMS  Constitutional: See HPI for pain assessment, No significant weight loss, recent trauma  Cardiovascular: No chest pain, shortness of breath  Respiratory: No difficulty breathing, cough  Gastrointestinal: No nausea, vomiting, diarrhea, constipation  Musculoskeletal: Noted in HPI, positive for pain, restricted motion, stiffness  Integumentary: No rashes, easy bruising, redness   Neurological: no  numbness or tingling in extremities, no gait disturbances   Psychiatric: No mood changes, memory changes, social issues  Heme/Lymph: no excessive swelling, easy bruising, excessive bleeding  ENT: No hearing changes  Eyes: No vision changes    CURRENT MEDICATIONS:   Current Outpatient Medications   Medication Sig Dispense Refill    amLODIPine (Norvasc) 5 mg tablet Take 1 tablet (5 mg) by mouth once daily. as directed 90 tablet 3    calcium carbonate-vitamin D3 600 mg-20 mcg (800 unit) tablet Take 1 tablet by mouth once daily.      cholecalciferol (Vitamin D-3) 50 mcg (2,000 unit) capsule Take by mouth.      citalopram (CeleXA) 40 mg tablet Take 1 tablet (40 mg) by mouth once daily. 90 tablet 3    docusate sodium (Colace) 100 mg capsule Take 1 capsule (100 mg) by mouth once daily at bedtime.      donepezil (Aricept) 10 mg tablet TAKE ONE TABLET BY MOUTH AT BEDTIME 90 tablet 0    levothyroxine (Synthroid, Levoxyl) 75 mcg tablet Take 1 tablet (75 mcg) by mouth once daily. as directed 90 tablet 3    metoprolol succinate XL (Toprol-XL) 25 mg 24 hr tablet Take 1 tablet (25 mg) by mouth once daily. Do not crush or chew. 90 tablet 3    omega-3 fatty acids-fish oil 300-1,000 mg capsule Take 2 capsules (2,000 mg) by mouth once daily.      omeprazole (PriLOSEC) 20 mg DR capsule TAKE ONE CAPSULE BY MOUTH EVERY DAY 90 capsule 0    simvastatin (Zocor) 20 mg tablet Take 1 tablet (20 mg) by mouth once daily. 90 tablet 3    tolterodine LA (Detrol LA) 2 mg 24 hr capsule Take 1 capsule (2 mg) by mouth once daily. (Patient not taking: Reported on 6/20/2024) 90 capsule 3     No current facility-administered medications for this visit.        OBJECTIVE    PHYSICAL EXAM      6/20/2024    10:30 AM 6/20/2024    10:40 AM 6/20/2024    10:50 AM 6/20/2024    11:00 AM 6/20/2024    11:10 AM 6/20/2024    11:20 AM 7/3/2024     1:58 PM   Vitals   Systolic 132 134 122 131 130 138    Diastolic 81 80 77 81 79 78    Heart Rate 67 68 63 65 65 66    Temp    "36.5 °C (97.7 °F)       Resp 15 17 17 14 20 17    Height (in)       1.6 m (5' 3\")   Weight (lb)       130   BMI       23.03 kg/m2   BSA (m2)       1.62 m2   Visit Report       Report      There is no height or weight on file to calculate BMI.    General: Well-appearing, no acute distress    Skin intact bilateral upper and lower extremities  No erythema  No warmth    Detailed examination of the left wrist and hand demonstrates:  Surgical incision(s) well healed   No erythema or warmth  No drainage  No ecchymosis  Resolving swelling, minimal  Wrist range of motion: gentle wrist range of motion intact  Able to make a loose fist without rotational abnormality  Upper extremity motor grossly intact  C5-T1 sensation intact bilaterally  2+ radial pulses bilaterally  Warm and well-perfused, brisk capillary refill    IMAGING:   X-ray views of the left wrist reviewed by me demonstrate a distal radius open reduction internal fixation with improved alignment and hardware in the appropriate position without evidence of hardware complication.   "

## 2024-08-05 ENCOUNTER — TREATMENT (OUTPATIENT)
Dept: OCCUPATIONAL THERAPY | Facility: HOSPITAL | Age: 89
End: 2024-08-05
Payer: MEDICARE

## 2024-08-05 DIAGNOSIS — R53.1 WEAKNESS: ICD-10-CM

## 2024-08-05 DIAGNOSIS — S52.602A: ICD-10-CM

## 2024-08-05 DIAGNOSIS — M25.632 DECREASED RANGE OF MOTION OF LEFT WRIST: Primary | ICD-10-CM

## 2024-08-05 DIAGNOSIS — S52.502A: ICD-10-CM

## 2024-08-05 PROCEDURE — 97530 THERAPEUTIC ACTIVITIES: CPT | Mod: GO | Performed by: OCCUPATIONAL THERAPIST

## 2024-08-05 PROCEDURE — 97110 THERAPEUTIC EXERCISES: CPT | Mod: GO | Performed by: OCCUPATIONAL THERAPIST

## 2024-08-05 ASSESSMENT — PAIN - FUNCTIONAL ASSESSMENT: PAIN_FUNCTIONAL_ASSESSMENT: 0-10

## 2024-08-05 ASSESSMENT — PAIN SCALES - GENERAL: PAINLEVEL_OUTOF10: 0 - NO PAIN

## 2024-08-05 NOTE — PROGRESS NOTES
"Occupational Therapy    OT Treatment    Patient Name: Gabrielle Turner  MRN: [id number]  Today's Date: 8/5/2024     Time Calculation  Start Time: 0300  Stop Time: 0345  Time Calculation (min): 45 min    Visit Number: 2  Therapeutic Procedures:  OT Therapeutic Procedures Time Entry  Therapeutic Activity Time Entry: 15  Therapeutic Exercise Time Entry: 30    Current Problem:  1. Decreased range of motion of left wrist        2. Traumatic closed displaced fracture of distal end of left radius and ulna  Follow Up In Occupational Therapy      3. Weakness            Subjective     General: Pt reports she is not experiencing any pain. Pt and caregiver have noticed improved participation in ADL/IADL tasks without exacerbating pain. Pt states her shoulder pain has completely subsided. Pt caregiver mention difficulty coming to therapy sessions d/t difficulty with home mobility. Pt stated \"We don't need to come back\" , caregiver and pt feel ready to; and request discharge.       OT Received On: 08/05/24     Pain:  Pain Assessment  Pain Assessment: 0-10  0-10 (Numeric) Pain Score: 0 - No pain    Objective       Therapy/Activity:     Therapeutic exercises/activities:  DOS: 6/17/24 Post-op: 7 Weeks   Re-check due on: 8/17/24    Exercises: Reps:   AROM 5 finger position 1x5    5 thumb position 1x5    Wrist flex/ext/UD/RD 1x5       AAROM 5 finger position 1x5   5 thumb position 1x5   Wrist flex/ext/UD/RD 1x5              Activities:    Foam Block Grasp  2x10   Digit Opposition alternating 2x10               Modalities:                    Manual:                    Functional review:     Completed on: 7/18/24 AROM     Quickdash Scores: 27.27%    Wrist Measurements:  WFL unless documented below   Flexion  Extension Radial Dev Ulnar Dev Supination Pronation   Right         Left 35 50 15 30 60 75     WFL unless documented below   Flexion  Extension Radial Dev Ulnar Dev Supination Pronation   Right         Left 15 35 25 40 40 70       OP " EDUCATION:  Education  Individual(s) Educated: Patient, Caregiver    Assessment:   Pt participated in therapeutic exercises to improve ROM, strength, and pain control. Pt completed therapeutic exercises, including new AAROM exercise with no increased pain. Pt completed hand strengthening exercises using foam block, tolerating exercises well without pain. L wrist measurements taken this date with significantly improved flexion/extension as well as supination and pronation. Pt and caregiver requesting discharge and are happy with Pt's progress. Pt provided with tools through HEP to continue to improve ROM and strength. Caregiver and Pt instructed to call if issues arise.      Plan:   Pt to contact therapy to schedule appointment if needed.      Goals:  Active       OT Goals       OT Goal 1 (Progressing)       Start:  07/18/24    Expected End:  10/10/24       LTG - Patient will indicate/ demonstrate the ability to resume all preinjury ADLs and IADLs without significant limits secondary to decreased ROM, decreased strength and/or pain as indicated by Quickdash score of less than 20.          OT Goal 2 (Not met)       Start:  07/18/24    Expected End:  10/10/24    Resolved:  08/05/24    Patient will demonstrate a progressive increase in ROM as appropriate with L wrist to be within 5-10 degrees of R wrist to help patient resume normal ADL and IADL function.          OT Goal 3 (Not met)       Start:  07/18/24    Expected End:  10/10/24    Resolved:  08/05/24    Pt will demonstrate increased  strength as appropriate after measurements indicated with the L  to be greater than or equal to 80% of the R to help patient resume ADLs and IADLs.          OT Goal 4 (Met)       Start:  07/18/24    Expected End:  10/10/24    Resolved:  08/05/24    Develop and issue HEP to help maximize ROM, strength and tolerance to help maximize return to all pre-onset activities.          OT Goal 5 (Met)       Start:  07/18/24    Expected  End:  10/10/24    Resolved:  08/05/24    Pain to be less than or equal to 2/10 with greater than or equal to 20 minutes activity.

## 2024-08-28 ENCOUNTER — TELEPHONE (OUTPATIENT)
Dept: PRIMARY CARE | Facility: CLINIC | Age: 89
End: 2024-08-28
Payer: MEDICARE

## 2024-08-28 DIAGNOSIS — F41.1 ANXIETY STATE: Primary | ICD-10-CM

## 2024-08-28 NOTE — TELEPHONE ENCOUNTER
Med Refill   LORazepam (Ativan) 0.5 mg tablet [[id number]]  DISCONTINUED     [facility] - [address] [address]  Phone: [phone]  Fax: [phone]  KAREN #: --

## 2024-08-29 RX ORDER — LORAZEPAM 0.5 MG/1
0.5 TABLET ORAL 2 TIMES DAILY PRN
Qty: 14 TABLET | Refills: 0 | Status: SHIPPED | OUTPATIENT
Start: 2024-08-29 | End: 2024-09-05

## 2024-09-09 ENCOUNTER — APPOINTMENT (OUTPATIENT)
Dept: ORTHOPEDIC SURGERY | Facility: CLINIC | Age: 89
End: 2024-09-09
Payer: MEDICARE

## 2024-09-09 ENCOUNTER — APPOINTMENT (OUTPATIENT)
Dept: RADIOLOGY | Facility: CLINIC | Age: 89
End: 2024-09-09
Payer: MEDICARE

## 2024-10-08 ENCOUNTER — APPOINTMENT (OUTPATIENT)
Dept: PRIMARY CARE | Facility: CLINIC | Age: 89
End: 2024-10-08
Payer: MEDICARE

## 2024-10-08 VITALS
RESPIRATION RATE: 16 BRPM | DIASTOLIC BLOOD PRESSURE: 78 MMHG | WEIGHT: 118 LBS | OXYGEN SATURATION: 98 % | BODY MASS INDEX: 20.91 KG/M2 | HEART RATE: 59 BPM | HEIGHT: 63 IN | SYSTOLIC BLOOD PRESSURE: 118 MMHG

## 2024-10-08 DIAGNOSIS — R30.0 DYSURIA: ICD-10-CM

## 2024-10-08 DIAGNOSIS — E03.9 ADULT HYPOTHYROIDISM: ICD-10-CM

## 2024-10-08 DIAGNOSIS — I10 BENIGN ESSENTIAL HYPERTENSION: ICD-10-CM

## 2024-10-08 DIAGNOSIS — G30.1 MILD LATE ONSET ALZHEIMER'S DEMENTIA WITHOUT BEHAVIORAL DISTURBANCE, PSYCHOTIC DISTURBANCE, MOOD DISTURBANCE, OR ANXIETY (MULTI): ICD-10-CM

## 2024-10-08 DIAGNOSIS — Z79.899 MEDICATION MANAGEMENT: Primary | ICD-10-CM

## 2024-10-08 DIAGNOSIS — F02.A0 MILD LATE ONSET ALZHEIMER'S DEMENTIA WITHOUT BEHAVIORAL DISTURBANCE, PSYCHOTIC DISTURBANCE, MOOD DISTURBANCE, OR ANXIETY (MULTI): ICD-10-CM

## 2024-10-08 DIAGNOSIS — R41.3 MEMORY LOSS: ICD-10-CM

## 2024-10-08 DIAGNOSIS — K21.9 GASTROESOPHAGEAL REFLUX DISEASE WITHOUT ESOPHAGITIS: ICD-10-CM

## 2024-10-08 DIAGNOSIS — F41.1 ANXIETY STATE: ICD-10-CM

## 2024-10-08 DIAGNOSIS — E78.5 HYPERLIPIDEMIA, UNSPECIFIED HYPERLIPIDEMIA TYPE: ICD-10-CM

## 2024-10-08 DIAGNOSIS — N39.41 SENSORY URGE INCONTINENCE: ICD-10-CM

## 2024-10-08 PROBLEM — M19.90 OSTEOARTHRITIS: Status: ACTIVE | Noted: 2024-10-08

## 2024-10-08 PROBLEM — Z98.890 HISTORY OF COLONOSCOPY: Status: ACTIVE | Noted: 2024-10-08

## 2024-10-08 PROBLEM — M53.3 PAIN IN THE COCCYX: Status: ACTIVE | Noted: 2024-10-08

## 2024-10-08 PROBLEM — R26.2 DISABILITY OF WALKING: Status: ACTIVE | Noted: 2024-10-08

## 2024-10-08 PROBLEM — T14.8XXA POST-TRAUMATIC WOUND INFECTION: Status: ACTIVE | Noted: 2024-10-08
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
## 2024-10-08 PROBLEM — F03.90 DEMENTIA: Status: ACTIVE | Noted: 2022-10-22

## 2024-10-08 PROBLEM — L08.9 POST-TRAUMATIC WOUND INFECTION: Status: ACTIVE | Noted: 2024-10-08

## 2024-10-08 PROBLEM — R53.81 PHYSICAL DECONDITIONING: Status: ACTIVE | Noted: 2024-10-08

## 2024-10-08 PROBLEM — T14.8XXA FRACTURE OF BONE: Status: ACTIVE | Noted: 2024-10-08

## 2024-10-08 PROBLEM — S42.209A FRACTURE OF PROXIMAL END OF HUMERUS: Status: ACTIVE | Noted: 2022-10-22
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
## 2024-10-08 PROBLEM — Z20.822 CONTACT WITH AND (SUSPECTED) EXPOSURE TO COVID-19: Status: ACTIVE | Noted: 2022-10-22

## 2024-10-08 PROBLEM — S61.459A DOG BITE OF HAND: Status: ACTIVE | Noted: 2024-10-08

## 2024-10-08 PROBLEM — J18.9 PNEUMONIA: Status: ACTIVE | Noted: 2022-10-19

## 2024-10-08 PROBLEM — H81.10 BENIGN PAROXYSMAL POSITIONAL VERTIGO: Status: ACTIVE | Noted: 2024-10-08

## 2024-10-08 PROBLEM — R09.02 HYPOXIA: Status: ACTIVE | Noted: 2024-10-08

## 2024-10-08 LAB
POC APPEARANCE, URINE: CLEAR
POC BILIRUBIN, URINE: NEGATIVE
POC BLOOD, URINE: ABNORMAL
POC COLOR, URINE: YELLOW
POC GLUCOSE, URINE: NEGATIVE MG/DL
POC KETONES, URINE: ABNORMAL MG/DL
POC LEUKOCYTES, URINE: ABNORMAL
POC NITRITE,URINE: NEGATIVE
POC PH, URINE: 8.5 PH
POC PROTEIN, URINE: ABNORMAL MG/DL
POC SPECIFIC GRAVITY, URINE: 1.02
POC UROBILINOGEN, URINE: 0.2 EU/DL

## 2024-10-08 PROCEDURE — 99214 OFFICE O/P EST MOD 30 MIN: CPT | Performed by: FAMILY MEDICINE

## 2024-10-08 PROCEDURE — 87086 URINE CULTURE/COLONY COUNT: CPT

## 2024-10-08 PROCEDURE — 81002 URINALYSIS NONAUTO W/O SCOPE: CPT | Performed by: FAMILY MEDICINE

## 2024-10-08 PROCEDURE — 1036F TOBACCO NON-USER: CPT | Performed by: FAMILY MEDICINE

## 2024-10-08 PROCEDURE — 1159F MED LIST DOCD IN RCRD: CPT | Performed by: FAMILY MEDICINE

## 2024-10-08 PROCEDURE — 3078F DIAST BP <80 MM HG: CPT | Performed by: FAMILY MEDICINE

## 2024-10-08 PROCEDURE — 1123F ACP DISCUSS/DSCN MKR DOCD: CPT | Performed by: FAMILY MEDICINE

## 2024-10-08 PROCEDURE — 3074F SYST BP LT 130 MM HG: CPT | Performed by: FAMILY MEDICINE

## 2024-10-08 RX ORDER — LEVOTHYROXINE SODIUM 75 UG/1
75 TABLET ORAL DAILY
Qty: 90 TABLET | Refills: 3 | Status: SHIPPED | OUTPATIENT
Start: 2024-10-08 | End: 2025-10-08

## 2024-10-08 RX ORDER — TOLTERODINE 2 MG/1
2 CAPSULE, EXTENDED RELEASE ORAL DAILY
Qty: 90 CAPSULE | Refills: 3 | Status: SHIPPED | OUTPATIENT
Start: 2024-10-08 | End: 2025-10-08

## 2024-10-08 RX ORDER — LORAZEPAM 0.5 MG/1
0.5 TABLET ORAL 2 TIMES DAILY PRN
Qty: 30 TABLET | Refills: 1 | Status: SHIPPED | OUTPATIENT
Start: 2024-10-08 | End: 2024-11-07

## 2024-10-08 RX ORDER — AMLODIPINE BESYLATE 5 MG/1
5 TABLET ORAL DAILY
Qty: 90 TABLET | Refills: 3 | Status: SHIPPED | OUTPATIENT
Start: 2024-10-08

## 2024-10-08 RX ORDER — OMEPRAZOLE 20 MG/1
20 CAPSULE, DELAYED RELEASE ORAL DAILY
Qty: 90 CAPSULE | Refills: 3 | Status: SHIPPED | OUTPATIENT
Start: 2024-10-08

## 2024-10-08 RX ORDER — SIMVASTATIN 20 MG/1
20 TABLET, FILM COATED ORAL DAILY
Qty: 90 TABLET | Refills: 3 | Status: SHIPPED | OUTPATIENT
Start: 2024-10-08

## 2024-10-08 RX ORDER — DONEPEZIL HYDROCHLORIDE 10 MG/1
10 TABLET, FILM COATED ORAL NIGHTLY
Qty: 90 TABLET | Refills: 3 | Status: SHIPPED | OUTPATIENT
Start: 2024-10-08

## 2024-10-08 RX ORDER — CITALOPRAM 40 MG/1
40 TABLET, FILM COATED ORAL DAILY
Qty: 90 TABLET | Refills: 3 | Status: SHIPPED | OUTPATIENT
Start: 2024-10-08

## 2024-10-08 ASSESSMENT — ENCOUNTER SYMPTOMS
DEPRESSION: 0
UNEXPECTED WEIGHT CHANGE: 1
OCCASIONAL FEELINGS OF UNSTEADINESS: 0
LOSS OF SENSATION IN FEET: 0

## 2024-10-08 ASSESSMENT — ANXIETY QUESTIONNAIRES
7. FEELING AFRAID AS IF SOMETHING AWFUL MIGHT HAPPEN: NOT AT ALL
5. BEING SO RESTLESS THAT IT IS HARD TO SIT STILL: NOT AT ALL
6. BECOMING EASILY ANNOYED OR IRRITABLE: NOT AT ALL
4. TROUBLE RELAXING: NOT AT ALL
2. NOT BEING ABLE TO STOP OR CONTROL WORRYING: NOT AT ALL
GAD7 TOTAL SCORE: 0
1. FEELING NERVOUS, ANXIOUS, OR ON EDGE: NOT AT ALL
3. WORRYING TOO MUCH ABOUT DIFFERENT THINGS: NOT AT ALL
IF YOU CHECKED OFF ANY PROBLEMS ON THIS QUESTIONNAIRE, HOW DIFFICULT HAVE THESE PROBLEMS MADE IT FOR YOU TO DO YOUR WORK, TAKE CARE OF THINGS AT HOME, OR GET ALONG WITH OTHER PEOPLE: NOT DIFFICULT AT ALL

## 2024-10-08 NOTE — PROGRESS NOTES
Subjective   Patient ID: Gabrielle Turner is a 89 y.o. female.    HPI Gabrielle comes in for follow up in wheelchair. She is doing well at home of daughter. On fall with broken arm.   Urinary symptoms, will send for culture  Naps daily,  No falls      OARRS:  No data recorded  I have personally reviewed the OARRS report for Gabrielle Turner. I have considered the risks of abuse, dependence, addiction and diversion and I believe that it is clinically appropriate for Gabrielle Turner to be prescribed this medication    Is the patient prescribed a combination of a benzodiazepine and opioid?  No    Last Urine Drug Screen / ordered today: No  No results found for this or any previous visit (from the past 8760 hour(s)).  Results are as expected.         Controlled Substance Agreement:  Date of the Last Agreement: 10/08/2024  Reviewed Controlled Substance Agreement including but not limited to the benefits, risks, and alternatives to treatment with a Controlled Substance medication(s).    Benzodiazepines:  What is the patient's goal of therapy? Improved agitation  Is this being achieved with current treatment? yes    LORENE-7:  Over the last 2 weeks, how often have you been bothered by any of the following problems?  Feeling nervous, anxious, or on edge: 0  Not being able to stop or control worryin  Worrying too much about different things: 0  Trouble relaxin  Being so restless that it is hard to sit still: 0  Becoming easily annoyed or irritable: 0  Feeling afraid as if something awful might happen: 0  LORENE-7 Total Score: 0        Activities of Daily Living:   Is your overall impression that this patient is benefiting (symptom reduction outweighs side effects) from benzodiazepine therapy? Yes     1. Physical Functioning: Better  2. Family Relationship: Better  3. Social Relationship: Better  4. Mood: Better  5. Sleep Patterns: Better  6. Overall Function: Better  Review of Systems   Constitutional:  Positive for unexpected  weight change (12 pounds down.).   Psychiatric/Behavioral:          Pleasant dementia   All other systems reviewed and are negative.    Left forearm has healed nicely.   Objective   Physical Exam  Vitals and nursing note reviewed.   Constitutional:       Appearance: Normal appearance. She is normal weight.      Comments: In wheelchair   HENT:      Head: Normocephalic and atraumatic.      Right Ear: Tympanic membrane normal.      Left Ear: Tympanic membrane normal.      Nose: Nose normal.      Mouth/Throat:      Mouth: Mucous membranes are moist.      Pharynx: Oropharynx is clear.   Eyes:      Extraocular Movements: Extraocular movements intact.      Conjunctiva/sclera: Conjunctivae normal.      Pupils: Pupils are equal, round, and reactive to light.   Cardiovascular:      Rate and Rhythm: Normal rate and regular rhythm.      Pulses: Normal pulses.      Heart sounds: Normal heart sounds.   Pulmonary:      Effort: Pulmonary effort is normal.      Breath sounds: Normal breath sounds.   Abdominal:      General: Abdomen is flat. Bowel sounds are normal.      Palpations: Abdomen is soft.   Musculoskeletal:         General: Normal range of motion.      Cervical back: Normal range of motion and neck supple.   Skin:     General: Skin is warm and dry.      Capillary Refill: Capillary refill takes less than 2 seconds.   Neurological:      General: No focal deficit present.      Mental Status: She is alert and oriented to person, place, and time.   Psychiatric:         Mood and Affect: Mood normal.         Behavior: Behavior normal.      Comments: Not aware of current time and date.          Assessment/Plan   Diagnoses and all orders for this visit:  Medication management- stable.   Mild late onset Alzheimer's dementia without behavioral disturbance, psychotic disturbance, mood disturbance, or anxiety (Multi)- J  -     Follow Up In [facility] - PCP - Established  Benign essential hypertension  -     amLODIPine  (Norvasc) 5 mg tablet; Take 1 tablet (5 mg) by mouth once daily. as directed  Metoprolol  -     CBC and Auto Differential; Future  -     Comprehensive Metabolic Panel; Future  -     Lipid Panel; Future  -     TSH with reflex to Free T4 if abnormal; Future  Anxiety state  -     citalopram (CeleXA) 40 mg tablet; Take 1 tablet (40 mg) by mouth once daily.  -     LORazepam (Ativan) 0.5 mg tablet; Take 1 tablet (0.5 mg) by mouth 2 times a day as needed for anxiety (and agitation).  Memory loss  -     donepezil (Aricept) 10 mg tablet; Take 1 tablet (10 mg) by mouth once daily at bedtime.  Adult hypothyroidism  -     levothyroxine (Synthroid, Levoxyl) 75 mcg tablet; Take 1 tablet (75 mcg) by mouth once daily. as directed  -     CBC and Auto Differential; Future  -     Comprehensive Metabolic Panel; Future  -     Lipid Panel; Future  -     TSH with reflex to Free T4 if abnormal; Future  Gastroesophageal reflux disease without esophagitis  -     omeprazole (PriLOSEC) 20 mg DR capsule; Take 1 capsule (20 mg) by mouth once daily.  -     CBC and Auto Differential; Future  -     Comprehensive Metabolic Panel; Future  -     Lipid Panel; Future  -     TSH with reflex to Free T4 if abnormal; Future  Hyperlipidemia, unspecified hyperlipidemia type  -     simvastatin (Zocor) 20 mg tablet; Take 1 tablet (20 mg) by mouth once daily.  Sensory urge incontinence  -     tolterodine LA (Detrol LA) 2 mg 24 hr capsule; Take 1 capsule (2 mg) by mouth once daily.  Dysuria  -     POCT UA (nonautomated) manually resulted  -     Urine Culture  Not enough urine to do both culture and drug screen, opted for urine culture.     Follow up six months.

## 2024-10-08 NOTE — PATIENT INSTRUCTIONS
If vomiting or burping worsens (has oemprazole now) Then may increase or add pepcid. Just call.   Glad to see your arm is healed.   If dizziness comes back and lasts for more than 2-3 days. Then call. We may try antivert to see if more helpful  Good to see you. I wrote an order for labwork, to get if interested.  This would only be if we are interested in taking away more medications.

## 2024-10-09 LAB — BACTERIA UR CULT: NORMAL

## 2024-11-26 ENCOUNTER — TELEPHONE (OUTPATIENT)
Dept: PRIMARY CARE | Facility: CLINIC | Age: 89
End: 2024-11-26
Payer: MEDICARE

## 2024-11-26 DIAGNOSIS — F41.1 ANXIETY STATE: ICD-10-CM

## 2024-11-26 NOTE — TELEPHONE ENCOUNTER
Patients daughter says that patients agitation has increased and the only thing that is helping is the lorazepam. Is asking for advice on how to help with the agitation.

## 2024-11-26 NOTE — TELEPHONE ENCOUNTER
Medication: lorazepam     Dosage: 0.5 mg tablet     Sig:  Take 1 tablet (0.5 mg) by mouth 2 times a day as needed for anxiety (and agitation).     Dispense Quantity:    Refills:     Pharmacy: [facility]     LOV: 10/8/24    NOV: 4/8/25

## 2024-11-27 RX ORDER — LORAZEPAM 0.5 MG/1
0.5 TABLET ORAL 2 TIMES DAILY PRN
Qty: 30 TABLET | Refills: 2 | Status: SHIPPED | OUTPATIENT
Start: 2024-11-27 | End: 2024-12-27

## 2025-01-09 ENCOUNTER — TELEPHONE (OUTPATIENT)
Dept: PRIMARY CARE | Facility: CLINIC | Age: 89
End: 2025-01-09
Payer: MEDICARE

## 2025-01-09 DIAGNOSIS — N30.00 ACUTE CYSTITIS WITHOUT HEMATURIA: Primary | ICD-10-CM

## 2025-01-09 RX ORDER — SULFAMETHOXAZOLE AND TRIMETHOPRIM 800; 160 MG/1; MG/1
1 TABLET ORAL 2 TIMES DAILY
Qty: 14 TABLET | Refills: 0 | Status: ON HOLD | OUTPATIENT
Start: 2025-01-09 | End: 2025-01-16

## 2025-01-09 NOTE — TELEPHONE ENCOUNTER
Daughter called stating her mom was acting different. She did a home test and it was positive for a UTI infection. Can she get medication for this.    Pharmacy: Pharmacy    [facility] - [place], [place] - [address]

## 2025-01-11 ENCOUNTER — APPOINTMENT (OUTPATIENT)
Dept: CARDIOLOGY | Facility: HOSPITAL | Age: 89
End: 2025-01-11
Payer: MEDICARE

## 2025-01-11 ENCOUNTER — HOSPITAL ENCOUNTER (EMERGENCY)
Facility: HOSPITAL | Age: 89
Discharge: OTHER INSTITUTION | End: 2025-01-11
Attending: EMERGENCY MEDICINE
Payer: MEDICARE

## 2025-01-11 ENCOUNTER — APPOINTMENT (OUTPATIENT)
Dept: RADIOLOGY | Facility: HOSPITAL | Age: 89
End: 2025-01-11
Payer: MEDICARE

## 2025-01-11 ENCOUNTER — HOSPITAL ENCOUNTER (INPATIENT)
Facility: HOSPITAL | Age: 89
End: 2025-01-11
Attending: EMERGENCY MEDICINE | Admitting: SURGERY
Payer: MEDICARE

## 2025-01-11 VITALS
DIASTOLIC BLOOD PRESSURE: 75 MMHG | RESPIRATION RATE: 18 BRPM | BODY MASS INDEX: 22.51 KG/M2 | HEIGHT: 60 IN | HEART RATE: 78 BPM | OXYGEN SATURATION: 99 % | TEMPERATURE: 97.3 F | SYSTOLIC BLOOD PRESSURE: 142 MMHG | WEIGHT: 114.64 LBS

## 2025-01-11 DIAGNOSIS — S06.5XAA SDH (SUBDURAL HEMATOMA) (MULTI): Primary | ICD-10-CM

## 2025-01-11 DIAGNOSIS — R41.0 DISORIENTATION: ICD-10-CM

## 2025-01-11 DIAGNOSIS — I10 BENIGN ESSENTIAL HYPERTENSION: ICD-10-CM

## 2025-01-11 LAB
ALBUMIN SERPL BCP-MCNC: 3.8 G/DL (ref 3.4–5)
ALP SERPL-CCNC: 78 U/L (ref 33–136)
ALT SERPL W P-5'-P-CCNC: 8 U/L (ref 7–45)
ANION GAP BLDV CALCULATED.4IONS-SCNC: 7 MMOL/L (ref 10–25)
ANION GAP SERPL CALC-SCNC: 13 MMOL/L (ref 10–20)
APPEARANCE UR: CLEAR
AST SERPL W P-5'-P-CCNC: 13 U/L (ref 9–39)
BASE EXCESS BLDV CALC-SCNC: 3 MMOL/L (ref -2–3)
BASOPHILS # BLD AUTO: 0.04 X10*3/UL (ref 0–0.1)
BASOPHILS NFR BLD AUTO: 0.6 %
BILIRUB SERPL-MCNC: 0.4 MG/DL (ref 0–1.2)
BILIRUB UR STRIP.AUTO-MCNC: NEGATIVE MG/DL
BODY TEMPERATURE: 37 DEGREES CELSIUS
BUN SERPL-MCNC: 21 MG/DL (ref 6–23)
CA-I BLDV-SCNC: 1.29 MMOL/L (ref 1.1–1.33)
CALCIUM SERPL-MCNC: 9.3 MG/DL (ref 8.6–10.3)
CARDIAC TROPONIN I PNL SERPL HS: 3 NG/L (ref 0–13)
CARDIAC TROPONIN I PNL SERPL HS: 4 NG/L (ref 0–13)
CHLORIDE BLDV-SCNC: 107 MMOL/L (ref 98–107)
CHLORIDE SERPL-SCNC: 106 MMOL/L (ref 98–107)
CO2 SERPL-SCNC: 24 MMOL/L (ref 21–32)
COLOR UR: ABNORMAL
CREAT SERPL-MCNC: 0.96 MG/DL (ref 0.5–1.05)
EGFRCR SERPLBLD CKD-EPI 2021: 57 ML/MIN/1.73M*2
EOSINOPHIL # BLD AUTO: 0.12 X10*3/UL (ref 0–0.4)
EOSINOPHIL NFR BLD AUTO: 1.7 %
ERYTHROCYTE [DISTWIDTH] IN BLOOD BY AUTOMATED COUNT: 12.5 % (ref 11.5–14.5)
FLUAV RNA RESP QL NAA+PROBE: NOT DETECTED
FLUBV RNA RESP QL NAA+PROBE: NOT DETECTED
GLUCOSE BLDV-MCNC: 106 MG/DL (ref 74–99)
GLUCOSE SERPL-MCNC: 105 MG/DL (ref 74–99)
GLUCOSE UR STRIP.AUTO-MCNC: NORMAL MG/DL
HCO3 BLDV-SCNC: 25.5 MMOL/L (ref 22–26)
HCT VFR BLD AUTO: 39.4 % (ref 36–46)
HCT VFR BLD EST: 43 % (ref 36–46)
HGB BLD-MCNC: 13.7 G/DL (ref 12–16)
HGB BLDV-MCNC: 14.2 G/DL (ref 12–16)
IMM GRANULOCYTES # BLD AUTO: 0.02 X10*3/UL (ref 0–0.5)
IMM GRANULOCYTES NFR BLD AUTO: 0.3 % (ref 0–0.9)
INHALED O2 CONCENTRATION: 21 %
KETONES UR STRIP.AUTO-MCNC: ABNORMAL MG/DL
LACTATE BLDV-SCNC: 1.6 MMOL/L (ref 0.4–2)
LEUKOCYTE ESTERASE UR QL STRIP.AUTO: NEGATIVE
LIPASE SERPL-CCNC: 115 U/L (ref 9–82)
LYMPHOCYTES # BLD AUTO: 1.34 X10*3/UL (ref 0.8–3)
LYMPHOCYTES NFR BLD AUTO: 18.8 %
MAGNESIUM SERPL-MCNC: 1.72 MG/DL (ref 1.6–2.4)
MCH RBC QN AUTO: 33.3 PG (ref 26–34)
MCHC RBC AUTO-ENTMCNC: 34.8 G/DL (ref 32–36)
MCV RBC AUTO: 96 FL (ref 80–100)
MONOCYTES # BLD AUTO: 0.73 X10*3/UL (ref 0.05–0.8)
MONOCYTES NFR BLD AUTO: 10.2 %
NEUTROPHILS # BLD AUTO: 4.88 X10*3/UL (ref 1.6–5.5)
NEUTROPHILS NFR BLD AUTO: 68.4 %
NITRITE UR QL STRIP.AUTO: NEGATIVE
NRBC BLD-RTO: 0 /100 WBCS (ref 0–0)
OXYHGB MFR BLDV: 48.8 % (ref 45–75)
PCO2 BLDV: 32 MM HG (ref 41–51)
PH BLDV: 7.51 PH (ref 7.33–7.43)
PH UR STRIP.AUTO: 7.5 [PH]
PLATELET # BLD AUTO: 237 X10*3/UL (ref 150–450)
PO2 BLDV: 32 MM HG (ref 35–45)
POTASSIUM BLDV-SCNC: 3.6 MMOL/L (ref 3.5–5.3)
POTASSIUM SERPL-SCNC: 3.5 MMOL/L (ref 3.5–5.3)
PROT SERPL-MCNC: 6.7 G/DL (ref 6.4–8.2)
PROT UR STRIP.AUTO-MCNC: NEGATIVE MG/DL
RBC # BLD AUTO: 4.12 X10*6/UL (ref 4–5.2)
RBC # UR STRIP.AUTO: NEGATIVE /UL
SAO2 % BLDV: 50 % (ref 45–75)
SARS-COV-2 RNA RESP QL NAA+PROBE: NOT DETECTED
SODIUM BLDV-SCNC: 136 MMOL/L (ref 136–145)
SODIUM SERPL-SCNC: 139 MMOL/L (ref 136–145)
SP GR UR STRIP.AUTO: 1.02
T4 FREE SERPL-MCNC: 1.29 NG/DL (ref 0.61–1.12)
TSH SERPL-ACNC: 6.26 MIU/L (ref 0.44–3.98)
UROBILINOGEN UR STRIP.AUTO-MCNC: NORMAL MG/DL
WBC # BLD AUTO: 7.1 X10*3/UL (ref 4.4–11.3)

## 2025-01-11 PROCEDURE — 85730 THROMBOPLASTIN TIME PARTIAL: CPT | Performed by: EMERGENCY MEDICINE

## 2025-01-11 PROCEDURE — 85610 PROTHROMBIN TIME: CPT | Performed by: EMERGENCY MEDICINE

## 2025-01-11 PROCEDURE — 84439 ASSAY OF FREE THYROXINE: CPT | Performed by: EMERGENCY MEDICINE

## 2025-01-11 PROCEDURE — 36415 COLL VENOUS BLD VENIPUNCTURE: CPT | Performed by: EMERGENCY MEDICINE

## 2025-01-11 PROCEDURE — 72128 CT CHEST SPINE W/O DYE: CPT | Mod: RSC

## 2025-01-11 PROCEDURE — 71260 CT THORAX DX C+: CPT

## 2025-01-11 PROCEDURE — 87636 SARSCOV2 & INF A&B AMP PRB: CPT | Performed by: EMERGENCY MEDICINE

## 2025-01-11 PROCEDURE — 72125 CT NECK SPINE W/O DYE: CPT

## 2025-01-11 PROCEDURE — 84132 ASSAY OF SERUM POTASSIUM: CPT | Performed by: EMERGENCY MEDICINE

## 2025-01-11 PROCEDURE — 70450 CT HEAD/BRAIN W/O DYE: CPT | Performed by: RADIOLOGY

## 2025-01-11 PROCEDURE — 93005 ELECTROCARDIOGRAM TRACING: CPT

## 2025-01-11 PROCEDURE — 85025 COMPLETE CBC W/AUTO DIFF WBC: CPT | Performed by: EMERGENCY MEDICINE

## 2025-01-11 PROCEDURE — 99285 EMERGENCY DEPT VISIT HI MDM: CPT | Mod: 25 | Performed by: EMERGENCY MEDICINE

## 2025-01-11 PROCEDURE — 72131 CT LUMBAR SPINE W/O DYE: CPT | Mod: RSC

## 2025-01-11 PROCEDURE — 71045 X-RAY EXAM CHEST 1 VIEW: CPT | Performed by: RADIOLOGY

## 2025-01-11 PROCEDURE — 2550000001 HC RX 255 CONTRASTS: Performed by: EMERGENCY MEDICINE

## 2025-01-11 PROCEDURE — 84443 ASSAY THYROID STIM HORMONE: CPT | Performed by: EMERGENCY MEDICINE

## 2025-01-11 PROCEDURE — 80053 COMPREHEN METABOLIC PANEL: CPT | Mod: MUE | Performed by: EMERGENCY MEDICINE

## 2025-01-11 PROCEDURE — 72125 CT NECK SPINE W/O DYE: CPT | Performed by: RADIOLOGY

## 2025-01-11 PROCEDURE — 84484 ASSAY OF TROPONIN QUANT: CPT | Performed by: EMERGENCY MEDICINE

## 2025-01-11 PROCEDURE — 70450 CT HEAD/BRAIN W/O DYE: CPT

## 2025-01-11 PROCEDURE — G0390 TRAUMA RESPONS W/HOSP CRITI: HCPCS

## 2025-01-11 PROCEDURE — 71260 CT THORAX DX C+: CPT | Performed by: RADIOLOGY

## 2025-01-11 PROCEDURE — 82330 ASSAY OF CALCIUM: CPT | Performed by: EMERGENCY MEDICINE

## 2025-01-11 PROCEDURE — 84295 ASSAY OF SERUM SODIUM: CPT | Performed by: EMERGENCY MEDICINE

## 2025-01-11 PROCEDURE — 74177 CT ABD & PELVIS W/CONTRAST: CPT | Performed by: RADIOLOGY

## 2025-01-11 PROCEDURE — 83735 ASSAY OF MAGNESIUM: CPT | Performed by: EMERGENCY MEDICINE

## 2025-01-11 PROCEDURE — 99285 EMERGENCY DEPT VISIT HI MDM: CPT | Performed by: EMERGENCY MEDICINE

## 2025-01-11 PROCEDURE — 81003 URINALYSIS AUTO W/O SCOPE: CPT | Performed by: EMERGENCY MEDICINE

## 2025-01-11 PROCEDURE — 72128 CT CHEST SPINE W/O DYE: CPT | Performed by: RADIOLOGY

## 2025-01-11 PROCEDURE — 83690 ASSAY OF LIPASE: CPT | Performed by: EMERGENCY MEDICINE

## 2025-01-11 PROCEDURE — 71045 X-RAY EXAM CHEST 1 VIEW: CPT

## 2025-01-11 PROCEDURE — 74177 CT ABD & PELVIS W/CONTRAST: CPT

## 2025-01-11 PROCEDURE — 72131 CT LUMBAR SPINE W/O DYE: CPT | Performed by: RADIOLOGY

## 2025-01-11 RX ADMIN — IOHEXOL 75 ML: 350 INJECTION, SOLUTION INTRAVENOUS at 18:13

## 2025-01-11 ASSESSMENT — PAIN SCALES - GENERAL
PAINLEVEL_OUTOF10: 0 - NO PAIN

## 2025-01-11 ASSESSMENT — COLUMBIA-SUICIDE SEVERITY RATING SCALE - C-SSRS
1. IN THE PAST MONTH, HAVE YOU WISHED YOU WERE DEAD OR WISHED YOU COULD GO TO SLEEP AND NOT WAKE UP?: NO
6. HAVE YOU EVER DONE ANYTHING, STARTED TO DO ANYTHING, OR PREPARED TO DO ANYTHING TO END YOUR LIFE?: NO
2. HAVE YOU ACTUALLY HAD ANY THOUGHTS OF KILLING YOURSELF?: NO

## 2025-01-11 ASSESSMENT — PAIN - FUNCTIONAL ASSESSMENT
PAIN_FUNCTIONAL_ASSESSMENT: 0-10
PAIN_FUNCTIONAL_ASSESSMENT: 0-10

## 2025-01-11 ASSESSMENT — PAIN DESCRIPTION - PROGRESSION: CLINICAL_PROGRESSION: NOT CHANGED

## 2025-01-11 NOTE — ED PROVIDER NOTES
HPI   Chief Complaint   Patient presents with    Altered Mental Status     Patient has not been acting her normal the last few days per family. Hx of dementia, she took an at home UTI test 2 days ago that was positive and patient was put on antibiotics. Patient fell yesterday, no LOC, no injuries, not on anticoagulants. Patient is complaining of not being able to talk well, no other complaints.        HPI    This is a 89-year-old female who was brought in from home for altered mental status.  Family did an at-home UTI kit and it was positive, patient was started on Bactrim.  She has dementia at baseline - unsure what her usual orientation is.  She is having speech problems currently, unable to provide any additional information. LKN unknown    I did call her daughter Lashawn Turner who is on her way.  I will speak to her when she arrives in the room.        Patient History   Past Medical History:   Diagnosis Date    Other specified postprocedural states     History of colonoscopy    Personal history of other diseases of the digestive system 04/27/2016    History of constipation     Past Surgical History:   Procedure Laterality Date    COLONOSCOPY  04/27/2016    Complete Colonoscopy    HERNIA REPAIR  04/27/2016    Hernia Repair     No family history on file.  Social History     Tobacco Use    Smoking status: Never    Smokeless tobacco: Never   Vaping Use    Vaping status: Never Used   Substance Use Topics    Alcohol use: Never    Drug use: Never       Physical Exam   ED Triage Vitals [01/11/25 1446]   Temperature Heart Rate Respirations BP   36.3 °C (97.3 °F) 66 16 134/87      Pulse Ox Temp src Heart Rate Source Patient Position   98 % -- Monitor Lying      BP Location FiO2 (%)     Left arm --       Physical Exam  Vitals and nursing note reviewed.   Constitutional:       Appearance: Normal appearance. She is normal weight.   HENT:      Head: Normocephalic and atraumatic.      Mouth/Throat:      Mouth: Mucous membranes  are moist.   Eyes:      Extraocular Movements: Extraocular movements intact.      Conjunctiva/sclera: Conjunctivae normal.      Pupils: Pupils are equal, round, and reactive to light.   Cardiovascular:      Rate and Rhythm: Normal rate and regular rhythm.      Pulses: Normal pulses.      Heart sounds: Normal heart sounds.   Pulmonary:      Effort: Pulmonary effort is normal. No respiratory distress.      Breath sounds: Normal breath sounds. No wheezing, rhonchi or rales.      Comments: Clear to auscultation without any wheezing or rhonchi.  No tachypnea or increased work of breathing.  Abdominal:      General: Abdomen is flat.      Palpations: Abdomen is soft.      Tenderness: There is no abdominal tenderness.      Comments: No abdominal tenderness to palpation, no rebound or guarding   Musculoskeletal:         General: Normal range of motion.      Cervical back: Normal range of motion and neck supple. No rigidity or tenderness.   Skin:     General: Skin is warm and dry.      Capillary Refill: Capillary refill takes less than 2 seconds.   Neurological:      Mental Status: She is alert.      Comments: Able to follow commands, word salad, cranial nerves II through XII intact, slight drift in the right upper extremity, positive sensation intact to light touch in all 4 extremities, difficulty with finger-nose due to complex command, she is able to touch her nose & my finger. Total NIH 2 (RUE 1, dysarthria 1)             ED Course & [facility]   ED Course as of 01/11/25 1637   Sat Jan 11, 2025   1634 I reviewed the CT head imaging that looked concerning for acute versus subacute subdural on the left, no midline shift.  Patient blood pressure is 134/87 at goal if this is in fact a subdural.  Added remaining trauma pan scan. [KF]      ED Course User Index  [KF] Duane Hoover MD MPH                 No data recorded     [place] Coma Scale Score: 14 (01/11/25 1508 : Luis Fernando Barnes RN)                           Medical Decision  Making  EKG interpreted by myself (ED attending physician). I independently interpreted the (Study: EKG), which showed [ ]    External Records Reviewed: I reviewed recent and relevant outside records including: Reviewed prior PCP notes to confirm late onset Alzheimer's dementia, hypertension, anxiety, hypothyroidism, GERD, hyperlipidemia and dysuria diagnoses.    Independent Interpretation of Studies:  I independently interpreted the CT head which showed concern for left subdural measuring approximately 10 cc per my calculation, no midline shift.  Also reviewed portable chest x-ray which showed lung markings in LLL, likely chronic changes, but no consolidation, pulmonary edema or acute processes    Ordered remaining trauma pan scan after reviewed the CT and saw evidence of left subdural    Escalation of Care:  Appropriate for inpatient management.      1600 spoke to daughter and son at bedside.  Patient lives with her daughter & partner.  The last 5 days patient has become less active than usual.  Unable to stand or transfer on her own which is unusual for her.  Is having episodes where she spaces out, is unable to communicate well which is also new for her.  Patient sustained a fall yesterday while on the toilet, unsure of any head trauma or LOC.  Not on any anticoagulants per daughter and per chart review. she started Bactrim 2 days ago for UTI.  Also has chronic vomiting that comes in waves and is episodic, has happened for the last year.  No prior CT imaging per daughter.  At this time patient's altered mental status and declining activity would warrant admission as she is not able to care for her.    Social Determinants Affecting Care: History of dementia, lives with daughter, patient's needs exceed what daughter can provide at home currently    Prescription Drug Consideration: no sepsis alert currently     Diagnostic testing considered: broad AMS    Discussion of Management with Other Providers:   I discussed  the patient/results with: [admitting team, consultant, radiologist, social work, EPAT, case management, PT/OT, RT, PCP, etc.]        Procedure  Procedures

## 2025-01-12 ENCOUNTER — APPOINTMENT (OUTPATIENT)
Dept: RADIOLOGY | Facility: HOSPITAL | Age: 89
End: 2025-01-12
Payer: MEDICARE

## 2025-01-12 ENCOUNTER — APPOINTMENT (OUTPATIENT)
Dept: NEUROLOGY | Facility: HOSPITAL | Age: 89
End: 2025-01-12
Payer: MEDICARE

## 2025-01-12 VITALS
DIASTOLIC BLOOD PRESSURE: 97 MMHG | OXYGEN SATURATION: 100 % | HEIGHT: 60 IN | HEART RATE: 71 BPM | BODY MASS INDEX: 22.38 KG/M2 | RESPIRATION RATE: 16 BRPM | SYSTOLIC BLOOD PRESSURE: 158 MMHG | WEIGHT: 114 LBS | TEMPERATURE: 97.3 F

## 2025-01-12 PROBLEM — S06.5XAA SDH (SUBDURAL HEMATOMA) (MULTI): Status: ACTIVE | Noted: 2025-01-12

## 2025-01-12 LAB
ALBUMIN SERPL BCP-MCNC: 4.1 G/DL (ref 3.4–5)
ALP SERPL-CCNC: 76 U/L (ref 33–136)
ALT SERPL W P-5'-P-CCNC: 8 U/L (ref 7–45)
ANION GAP SERPL CALC-SCNC: 18 MMOL/L (ref 10–20)
APTT PPP: 31 SECONDS (ref 27–38)
AST SERPL W P-5'-P-CCNC: 15 U/L (ref 9–39)
BASOPHILS # BLD AUTO: 0.04 X10*3/UL (ref 0–0.1)
BASOPHILS NFR BLD AUTO: 0.5 %
BILIRUB SERPL-MCNC: 0.8 MG/DL (ref 0–1.2)
BUN SERPL-MCNC: 18 MG/DL (ref 6–23)
CALCIUM SERPL-MCNC: 9.8 MG/DL (ref 8.6–10.6)
CHLORIDE SERPL-SCNC: 105 MMOL/L (ref 98–107)
CO2 SERPL-SCNC: 20 MMOL/L (ref 21–32)
CREAT SERPL-MCNC: 0.85 MG/DL (ref 0.5–1.05)
EGFRCR SERPLBLD CKD-EPI 2021: 66 ML/MIN/1.73M*2
EOSINOPHIL # BLD AUTO: 0.06 X10*3/UL (ref 0–0.4)
EOSINOPHIL NFR BLD AUTO: 0.7 %
ERYTHROCYTE [DISTWIDTH] IN BLOOD BY AUTOMATED COUNT: 12.1 % (ref 11.5–14.5)
GLUCOSE SERPL-MCNC: 102 MG/DL (ref 74–99)
HCT VFR BLD AUTO: 38.6 % (ref 36–46)
HGB BLD-MCNC: 14.1 G/DL (ref 12–16)
IMM GRANULOCYTES # BLD AUTO: 0.06 X10*3/UL (ref 0–0.5)
IMM GRANULOCYTES NFR BLD AUTO: 0.7 % (ref 0–0.9)
INR PPP: 1.2 (ref 0.9–1.1)
LYMPHOCYTES # BLD AUTO: 1.2 X10*3/UL (ref 0.8–3)
LYMPHOCYTES NFR BLD AUTO: 13.6 %
MCH RBC QN AUTO: 33 PG (ref 26–34)
MCHC RBC AUTO-ENTMCNC: 36.5 G/DL (ref 32–36)
MCV RBC AUTO: 90 FL (ref 80–100)
MONOCYTES # BLD AUTO: 0.94 X10*3/UL (ref 0.05–0.8)
MONOCYTES NFR BLD AUTO: 10.6 %
NEUTROPHILS # BLD AUTO: 6.54 X10*3/UL (ref 1.6–5.5)
NEUTROPHILS NFR BLD AUTO: 73.9 %
NRBC BLD-RTO: 0 /100 WBCS (ref 0–0)
PLATELET # BLD AUTO: 253 X10*3/UL (ref 150–450)
POTASSIUM SERPL-SCNC: 3.7 MMOL/L (ref 3.5–5.3)
PROT SERPL-MCNC: 7.5 G/DL (ref 6.4–8.2)
PROTHROMBIN TIME: 13.1 SECONDS (ref 9.8–12.8)
RBC # BLD AUTO: 4.27 X10*6/UL (ref 4–5.2)
SODIUM SERPL-SCNC: 139 MMOL/L (ref 136–145)
WBC # BLD AUTO: 8.8 X10*3/UL (ref 4.4–11.3)

## 2025-01-12 PROCEDURE — 95816 EEG AWAKE AND DROWSY: CPT

## 2025-01-12 PROCEDURE — 99221 1ST HOSP IP/OBS SF/LOW 40: CPT | Performed by: SURGERY

## 2025-01-12 PROCEDURE — 2500000002 HC RX 250 W HCPCS SELF ADMINISTERED DRUGS (ALT 637 FOR MEDICARE OP, ALT 636 FOR OP/ED)

## 2025-01-12 PROCEDURE — 2500000004 HC RX 250 GENERAL PHARMACY W/ HCPCS (ALT 636 FOR OP/ED)

## 2025-01-12 PROCEDURE — 2500000001 HC RX 250 WO HCPCS SELF ADMINISTERED DRUGS (ALT 637 FOR MEDICARE OP)

## 2025-01-12 PROCEDURE — 1100000001 HC PRIVATE ROOM DAILY

## 2025-01-12 PROCEDURE — 95816 EEG AWAKE AND DROWSY: CPT | Performed by: STUDENT IN AN ORGANIZED HEALTH CARE EDUCATION/TRAINING PROGRAM

## 2025-01-12 PROCEDURE — 99221 1ST HOSP IP/OBS SF/LOW 40: CPT | Performed by: ORTHOPAEDIC SURGERY

## 2025-01-12 RX ORDER — OXYBUTYNIN CHLORIDE 5 MG/1
5 TABLET ORAL 2 TIMES DAILY
Status: DISPENSED | OUTPATIENT
Start: 2025-01-12

## 2025-01-12 RX ORDER — PANTOPRAZOLE SODIUM 40 MG/1
40 TABLET, DELAYED RELEASE ORAL
Status: ACTIVE | OUTPATIENT
Start: 2025-01-12

## 2025-01-12 RX ORDER — METOPROLOL TARTRATE 25 MG/1
12.5 TABLET, FILM COATED ORAL 2 TIMES DAILY
Status: DISPENSED | OUTPATIENT
Start: 2025-01-12

## 2025-01-12 RX ORDER — DONEPEZIL HYDROCHLORIDE 10 MG/1
10 TABLET, FILM COATED ORAL NIGHTLY
Status: DISPENSED | OUTPATIENT
Start: 2025-01-12

## 2025-01-12 RX ORDER — DOCUSATE SODIUM 100 MG/1
100 CAPSULE, LIQUID FILLED ORAL NIGHTLY
Status: DISCONTINUED | OUTPATIENT
Start: 2025-01-12 | End: 2025-01-12

## 2025-01-12 RX ORDER — LORAZEPAM 0.5 MG/1
0.5 TABLET ORAL ONCE
Status: COMPLETED | OUTPATIENT
Start: 2025-01-12 | End: 2025-01-12

## 2025-01-12 RX ORDER — POLYETHYLENE GLYCOL 3350 17 G/17G
17 POWDER, FOR SOLUTION ORAL DAILY
Status: DISPENSED | OUTPATIENT
Start: 2025-01-12

## 2025-01-12 RX ORDER — ACETAMINOPHEN 325 MG/1
650 TABLET ORAL EVERY 6 HOURS SCHEDULED
Status: DISPENSED | OUTPATIENT
Start: 2025-01-12

## 2025-01-12 RX ORDER — AMLODIPINE BESYLATE 5 MG/1
5 TABLET ORAL DAILY
Status: DISPENSED | OUTPATIENT
Start: 2025-01-12

## 2025-01-12 RX ORDER — DOCUSATE SODIUM 100 MG/1
100 CAPSULE, LIQUID FILLED ORAL DAILY
Status: DISPENSED | OUTPATIENT
Start: 2025-01-12

## 2025-01-12 RX ORDER — CITALOPRAM 40 MG/1
40 TABLET, FILM COATED ORAL DAILY
Status: DISPENSED | OUTPATIENT
Start: 2025-01-12

## 2025-01-12 RX ORDER — LEVOTHYROXINE SODIUM 75 UG/1
75 TABLET ORAL DAILY
Status: DISPENSED | OUTPATIENT
Start: 2025-01-12

## 2025-01-12 RX ORDER — SIMVASTATIN 20 MG/1
20 TABLET, FILM COATED ORAL DAILY
Status: DISPENSED | OUTPATIENT
Start: 2025-01-12

## 2025-01-12 RX ADMIN — SIMVASTATIN 20 MG: 20 TABLET, FILM COATED ORAL at 09:50

## 2025-01-12 RX ADMIN — LORAZEPAM 0.5 MG: 0.5 TABLET ORAL at 00:31

## 2025-01-12 RX ADMIN — OXYBUTYNIN CHLORIDE 5 MG: 5 TABLET ORAL at 02:19

## 2025-01-12 RX ADMIN — OXYBUTYNIN CHLORIDE 5 MG: 5 TABLET ORAL at 09:50

## 2025-01-12 RX ADMIN — AMLODIPINE BESYLATE 5 MG: 5 TABLET ORAL at 09:50

## 2025-01-12 RX ADMIN — LORAZEPAM 0.5 MG: 0.5 TABLET ORAL at 23:15

## 2025-01-12 RX ADMIN — OXYBUTYNIN CHLORIDE 5 MG: 5 TABLET ORAL at 22:05

## 2025-01-12 RX ADMIN — DOCUSATE SODIUM 100 MG: 100 CAPSULE, LIQUID FILLED ORAL at 09:50

## 2025-01-12 RX ADMIN — ACETAMINOPHEN 650 MG: 325 TABLET ORAL at 02:19

## 2025-01-12 RX ADMIN — PANTOPRAZOLE SODIUM 40 MG: 40 TABLET, DELAYED RELEASE ORAL at 09:46

## 2025-01-12 RX ADMIN — METOPROLOL TARTRATE 12.5 MG: 25 TABLET, FILM COATED ORAL at 02:19

## 2025-01-12 RX ADMIN — CITALOPRAM HYDROBROMIDE 40 MG: 20 TABLET ORAL at 09:51

## 2025-01-12 RX ADMIN — DONEPEZIL HYDROCHLORIDE 10 MG: 10 TABLET, FILM COATED ORAL at 02:19

## 2025-01-12 RX ADMIN — LEVOTHYROXINE SODIUM 75 MCG: 75 TABLET ORAL at 09:51

## 2025-01-12 RX ADMIN — POLYETHYLENE GLYCOL 3350 17 G: 17 POWDER, FOR SOLUTION ORAL at 09:50

## 2025-01-12 RX ADMIN — DONEPEZIL HYDROCHLORIDE 10 MG: 10 TABLET, FILM COATED ORAL at 22:05

## 2025-01-12 RX ADMIN — METOPROLOL TARTRATE 12.5 MG: 25 TABLET, FILM COATED ORAL at 22:05

## 2025-01-12 RX ADMIN — METOPROLOL TARTRATE 12.5 MG: 25 TABLET, FILM COATED ORAL at 09:51

## 2025-01-12 SDOH — SOCIAL STABILITY: SOCIAL INSECURITY: ARE THERE ANY APPARENT SIGNS OF INJURIES/BEHAVIORS THAT COULD BE RELATED TO ABUSE/NEGLECT?: UNABLE TO ASSESS

## 2025-01-12 SDOH — SOCIAL STABILITY: SOCIAL INSECURITY: HAVE YOU HAD ANY THOUGHTS OF HARMING ANYONE ELSE?: NO

## 2025-01-12 SDOH — SOCIAL STABILITY: SOCIAL INSECURITY: ABUSE: ADULT

## 2025-01-12 SDOH — SOCIAL STABILITY: SOCIAL INSECURITY: WERE YOU ABLE TO COMPLETE ALL THE BEHAVIORAL HEALTH SCREENINGS?: YES

## 2025-01-12 SDOH — SOCIAL STABILITY: SOCIAL INSECURITY: DO YOU FEEL UNSAFE GOING BACK TO THE PLACE WHERE YOU ARE LIVING?: UNABLE TO ASSESS

## 2025-01-12 SDOH — SOCIAL STABILITY: SOCIAL INSECURITY: ARE YOU OR HAVE YOU BEEN THREATENED OR ABUSED PHYSICALLY, EMOTIONALLY, OR SEXUALLY BY ANYONE?: UNABLE TO ASSESS

## 2025-01-12 SDOH — SOCIAL STABILITY: SOCIAL INSECURITY: HAS ANYONE EVER THREATENED TO HURT YOUR FAMILY OR YOUR PETS?: UNABLE TO ASSESS

## 2025-01-12 SDOH — SOCIAL STABILITY: SOCIAL INSECURITY: DO YOU FEEL ANYONE HAS EXPLOITED OR TAKEN ADVANTAGE OF YOU FINANCIALLY OR OF YOUR PERSONAL PROPERTY?: UNABLE TO ASSESS

## 2025-01-12 SDOH — SOCIAL STABILITY: SOCIAL INSECURITY: HAVE YOU HAD THOUGHTS OF HARMING ANYONE ELSE?: UNABLE TO ASSESS

## 2025-01-12 SDOH — SOCIAL STABILITY: SOCIAL INSECURITY: DOES ANYONE TRY TO KEEP YOU FROM HAVING/CONTACTING OTHER FRIENDS OR DOING THINGS OUTSIDE YOUR HOME?: UNABLE TO ASSESS

## 2025-01-12 ASSESSMENT — ACTIVITIES OF DAILY LIVING (ADL)
DRESSING YOURSELF: NEEDS ASSISTANCE
PATIENT'S MEMORY ADEQUATE TO SAFELY COMPLETE DAILY ACTIVITIES?: NO
TOILETING: DEPENDENT
JUDGMENT_ADEQUATE_SAFELY_COMPLETE_DAILY_ACTIVITIES: NO
FEEDING YOURSELF: NEEDS ASSISTANCE
BATHING: DEPENDENT
ASSISTIVE_DEVICE: WALKER
ADEQUATE_TO_COMPLETE_ADL: YES
HEARING - LEFT EAR: DIFFICULTY WITH NOISE
HEARING - RIGHT EAR: DIFFICULTY WITH NOISE
GROOMING: NEEDS ASSISTANCE
WALKS IN HOME: NEEDS ASSISTANCE

## 2025-01-12 ASSESSMENT — COGNITIVE AND FUNCTIONAL STATUS - GENERAL
PERSONAL GROOMING: A LITTLE
EATING MEALS: A LITTLE
CLIMB 3 TO 5 STEPS WITH RAILING: A LOT
DAILY ACTIVITIY SCORE: 21
PATIENT BASELINE BEDBOUND: NO
WALKING IN HOSPITAL ROOM: A LITTLE
MOBILITY SCORE: 21
TOILETING: A LITTLE

## 2025-01-12 ASSESSMENT — PAIN DESCRIPTION - PROGRESSION: CLINICAL_PROGRESSION: NOT CHANGED

## 2025-01-12 ASSESSMENT — LIFESTYLE VARIABLES
HOW OFTEN DO YOU HAVE A DRINK CONTAINING ALCOHOL: PATIENT UNABLE TO ANSWER
AUDIT-C TOTAL SCORE: -1
AUDIT-C TOTAL SCORE: -1
HOW OFTEN DO YOU HAVE 6 OR MORE DRINKS ON ONE OCCASION: PATIENT UNABLE TO ANSWER
HOW MANY STANDARD DRINKS CONTAINING ALCOHOL DO YOU HAVE ON A TYPICAL DAY: PATIENT UNABLE TO ANSWER
SKIP TO QUESTIONS 9-10: 0

## 2025-01-12 ASSESSMENT — PATIENT HEALTH QUESTIONNAIRE - PHQ9
2. FEELING DOWN, DEPRESSED OR HOPELESS: NOT AT ALL
1. LITTLE INTEREST OR PLEASURE IN DOING THINGS: NOT AT ALL
SUM OF ALL RESPONSES TO PHQ9 QUESTIONS 1 & 2: 0

## 2025-01-12 ASSESSMENT — PAIN SCALES - GENERAL
PAINLEVEL_OUTOF10: 0 - NO PAIN

## 2025-01-12 ASSESSMENT — PAIN - FUNCTIONAL ASSESSMENT: PAIN_FUNCTIONAL_ASSESSMENT: 0-10

## 2025-01-12 NOTE — CONSULTS
[facility] Department of Orthopaedic Surgery   Initial Consult Note  01/12/25    HPI:   Orthopaedic Problems/Injuries: T9 inferior endplate fracture  Other Injuries: acute on chronic subdural hematoma    89F (Alzheimer's, Osteoporosis, L DRF s/p ORIF w/ Dr. Del Valle (6/20/2024)) p/a mGLF.  Patient presents as a transfer from [facility] after she was found to have acute on chronic subdural hemorrhage after an unwitnessed fall at home.  Patient lives at home with her sister who has ALS distance from family.  Patient is AO x 2 at baseline.  Ambulates with walker. Patient is pleasantly confused able to participate in exam.  She is unable to recall the exact events leading up to or surrounding recent falls.     ROS  12-point review of systems is negative other than what is mentioned above.    Physical Exam:  Gen: AOx1, NAD  HEENT: normocephalic atraumatic  Psych: appropriate mood and affect  Resp: nonlabored breathing  Cardiac: Extremities WWP, RRR to peripheral palpation  Neuro: CN 2-12 grossly intact  Skin: no rashes  MSK:   SPINE EXAM:  - Mild tenderness to palpation of T spine. No step-offs or deformities.   - Of note patient requires significant redirection with exam    C5: SILT   Deltoid 5/5 Left; 5/5 Right  C6: SILT   Wrist Ext: 5/5 Left; 5/5 Right  C7: SILT   Triceps: 5/5 Left; 5/5 Right  C8: SILT   Finger flexion: 5/5 Left; 5/5 Right  T1: SILT    Interossei: 5/5 Left; 5/5 Right    Negative otero    L1: SILT       L2: SILT      Hip flexors 5/5 Left; 5/5 Right  L3: SILT      Knee extension 5/5 Left; 5/5 Right  L4: SILT      Tib Ant. (Dorsiflexion) 5/5 Left; 5/5 Right  L5: SILT      EHL5/5 Left; 5/5 Right  S1: SILT      Planter flexion 5/5 Left; 5/5 Right    No ankle clonus, negative babinski    A full secondary exam was performed and all relevant findings discussed and noted above.    Imaging:    CT imaging of the cervical thoracic and lumbar spine reviewed independently demonstrates a T9 inferior  endplate fracture 30% height loss, no severe canal stenosis     Assessment:  Orthopaedic Problems/Injuries: T9 inferior endplate fracture    89F (Alzheimer's, Osteoporosis, L DRF s/p ORIF w/ Dr. Del Valle (6/20/2024)), p/a mGLF. A/w acute on chronic SDH. On exam, baseline AOx1, 5/5 strength BLUE/BLLE, SILT, no UMN sx. XR/CT w/ T9 IEP fx w/ 30% height loss.      Plan:  - Dispo: Trauma Primary  - OR: no acute operative intervention indicated at this time  - WB: OOBAT, no restrictions  - Diet: OK for diet from an Orthopaedic perspective  - T/L uprights when able  - Ortho spine team to follow peripherally while in-house  - Final recommendations pending attending attestation     This patient was seen within 30 minutes of initial consult and staffed with attending physician Dr. Lawrence.     This patient will be followed by the ORTHO SPINE service while in-house. Please contact the following team members for any additional questions/concerns.     Ortho Spine  Juarez Pearce MD PGY2  Yahir Goff MD PGY4     Please page [phone] (ortho on-call) after 6pm and on weekends.  _________________________________________________________________________________    Denver Lafleur MD PGY2  Orthopaedic Surgery  On-call Resident  Epic Chat Preferred'    I saw and evaluated the patient.  I personally obtained the key and critical portions of the history and physical exam or was physically present for key and critical portions performed by the Resident. I reviewed the documentation and discussed the patient with the Resident.  I agree with the Resident’s medical decision making as documented in the note.    89yo female s/p unwitness fall with acute on chronic subdural hemorrhage. CT spine with T9 compression fracture. Otherwise neuro intact. Stable fracture. Recommend closed management. Upright XR T Spine when able. Follow up in clinic in 2 weeks

## 2025-01-12 NOTE — PROGRESS NOTES
Gabrielle Turner is a 89 y.o. female on day 0 of admission presenting with SDH (subdural hematoma) (Multi). Patient transferred from [facility]. PMH includes dementia, per family, patient is A&Ox1 at baseline. No surgical intervention indicated. Patient pending admission for PT/OT and Geriatrics consult. Patient will need placement as daughter reports she cannot adequately care for her. SW will continue to follow to assist with a safe discharge plan.          Carito Cee LCSW

## 2025-01-12 NOTE — ED TRIAGE NOTES
Pt transferred from [place] for neurology consult d/t acute on chronic subdural. Pt had a fall yesterday w/o LOC or headstrike, but is complaining of altered mental status. Pt A&O x0 upon arrival, NIHSS 5 by this RN.

## 2025-01-12 NOTE — CONSULTS
Consults  Date of Service:  1/11/2025 Attending Provider:  Andreas Murphy MD     Reason for Consultation:  Gabrielle Turner is being seen today for a consult requested by Andreas Murphy MD for subdural hematoma.    Subjective   History of Present Illness:  Gabrielle is a 89 y.o. female with No Principal Problem: There is no principal problem currently on the Problem List. Please update the Problem List and refresh.    Patient with sitter at bedside, able to say words and is in good spirits. Unable to provide history but denied any pain or headaches.     Patient presented to OSH for AMS after a fall a day ago. Found to have subdural hematoma on the L and was transferred to  for further work up.     Review of Systems negative other than listed in HPI.    Objective   Vitals:  Vitals:    01/11/25 2206   BP: 155/70   Pulse: 93   Resp: 19   Temp: 36.3 °C (97.3 °F)   SpO2: 98%         Exam:  Constitutional: No acute distress  Resp: breathing comfortably  Cardio: well perfused  GI: nondistended  MSK: full range of motion  Neuro: Awake, interactive  Ox1  names 1/3, repetition intact  Follows commands greater than antigravity symmetrically  Psych: appropriate  Skin: no obvious lesions    Medical History  Past Medical History:   Diagnosis Date    Other specified postprocedural states     History of colonoscopy    Personal history of other diseases of the digestive system 04/27/2016    History of constipation       Surgical History  Past Surgical History:   Procedure Laterality Date    COLONOSCOPY  04/27/2016    Complete Colonoscopy    HERNIA REPAIR  04/27/2016    Hernia Repair        Medications  Current Outpatient Medications   Medication Instructions    amLODIPine (NORVASC) 5 mg, oral, Daily, as directed    calcium carbonate-vitamin D3 600 mg-20 mcg (800 unit) tablet 1 tablet, oral, Daily    cholecalciferol (Vitamin D-3) 50 mcg (2,000 unit) capsule oral    citalopram (CELEXA) 40 mg, oral, Daily    docusate sodium  "(Colace) 100 mg capsule 1 capsule, oral, Nightly    donepezil (ARICEPT) 10 mg, oral, Nightly    levothyroxine (SYNTHROID, LEVOXYL) 75 mcg, oral, Daily, as directed    LORazepam (ATIVAN) 0.5 mg, oral, 2 times daily PRN    metoprolol succinate XL (TOPROL-XL) 25 mg, oral, Daily, Do not crush or chew.    omega-3 fatty acids-fish oil 300-1,000 mg capsule 2 g, oral, Daily RT    omeprazole (PRILOSEC) 20 mg, oral, Daily    simvastatin (ZOCOR) 20 mg, oral, Daily    sulfamethoxazole-trimethoprim (Bactrim DS) 800-160 mg tablet 1 tablet, oral, 2 times daily    tolterodine LA (DETROL LA) 2 mg, oral, Daily        Diagnostic Results:    Lab Results   Component Value Date    WBC 7.1 01/11/2025    HGB 13.7 01/11/2025    HCT 39.4 01/11/2025    MCV 96 01/11/2025     01/11/2025     Lab Results   Component Value Date    CREATININE 0.96 01/11/2025    BUN 21 01/11/2025     01/11/2025    K 3.5 01/11/2025     01/11/2025    CO2 24 01/11/2025     No results found for: \"INR\", \"PROTIME\"    === 01/11/25 ===    CT LUMBAR SPINE WO IV CONTRAST    - Impression -  No acute abnormality of the chest, abdomen and pelvis.    There is a 4 mm nodule in the left lower lobe, stable from CT scan of  10/18/2022.    Bladder is under distended with mild wall thickening and mucosal  hyperenhancement. Correlate with urinalysis to exclude infectious  cystitis.    Small hiatal hernia.  There is focal wall thickening in the region of  the distal gastric antrum which raises concern for gastritis.  Correlate with symptomatology for the need for further evaluation  with direct visualization.    Scattered colonic diverticula without evidence for acute  diverticulitis.    There is compression deformity along the inferior endplate of the T9  vertebral body with approximate 25% loss of vertebral body height.  This is age indeterminate but new from CT scan of the chest dated  10/18/2022. Correlate with symptomatology at this level for the need  for further " evaluation    No acute fracture or traumatic subluxation of the lumbar spine.    Multilevel discogenic degenerative changes as noted above.    Additional findings as described above.    MACRO:  None    Signed by: Dasia Tovar 1/11/2025 7:19 PM  Dictation workstation:   [id number]    GCS:   - Motor: 6 - Follows simple motor commands  - Verbal: 4 - Seems confused, disoriented  - Eyes: 4 - Opens eyes on own  - TOTAL: 14    Assessment/Plan   Assessment:  89yF h/o HTN, HLD, hypothyroidism, alzheimer's, GERD, prior T9 compression fx, anxiety, p/w aphasia, lethargy for 5d, fall yesterday, UTI positive on home kit and started on bactrim 2d ago, CTH 1.8cm mixed density L convexity SDH, rCTH stable    Patient with stable mixed density subdural hematoma, either acute on chronic or possibly just membranes. Patient with altered mental status possibly related to UTI/metabolic (with elevated TSH/thyroxine), but should rule out seizure especially with intermittent aphasia in the setting of a L subdural hematoma.     Plan:  Trauma primary  Recommend EEG to rule out seizures for intermittent aphasia  Please ensure patient has CBC/RFP/coag/T&S/UA/UPT (if applicable)/EKG/CXR  No acute neurosurgical intervention indicated at this time      Siria Naylor MD

## 2025-01-12 NOTE — H&P
[facility]  TRAUMA SERVICE - HISTORY AND PHYSICAL / CONSULT    Patient Name: Gabrielle Turner  MRN: [id number]  Admit Date: 111  : 1935  AGE: 89 y.o.   GENDER: female  ==============================================================================  MECHANISM OF INJURY / CHIEF COMPLAINT:   Pt is an 89 YOF who arrived as a transfer after being found on her hands and knees at home with presumed unwitnessed fall. Per patient family she has a history of dementia and at best is alert to self only. Trauma scans at outside hospital revealed an acute on chronic SDH and T9 compression deformity. Patient was transferred to [facility] for repeat scan and and NSGY consult.  LOC (yes/no?): Unknown  Anticoagulant / Anti-platelet Rx? (for what dx?): None  Referring Facility Name (N/A for scene EMR run): [facility]    INJURIES:   SDH Acute on Chronic  T9 Inferior Endplate Deformity    OTHER MEDICAL PROBLEMS:  Dementia  HTN  HLD  Hypothyroidism  GERD  PVD    INCIDENTAL FINDINGS:  Small hiatal hernia  Scattered colonic diverticula   4 mm LLL nodule    ==============================================================================  ADMISSION PLAN OF CARE:  ##SDH  - Neurosurgery consulted  - [facility] stable, no further imaging need at this time  - q4h neuro checks  - EEG ordered to rule out seizures for intermittent aphasia  - No acute interventions at this time    - Home medications restarted, will hold home ativan and bactrim at this time.  - PT/OT, SW and geriatrics consulted to assist with possible need for placement as patients daughter reported she is unable to adequately care for her.    ##T9 Compression Deformity  - Ortho spine consulted awaiting recs    Dispo: Admit to trauma floor for PT/OT and assessment for placement need    Patient discussed with Dr. Parker,    Dasia Cee, CNP  Trauma Surgery  Floor: [id number] TICU: [id number]  Pager: [phone]    Total face to face time spent with patient of 35  minutes, with >50% of the time spent discussing plan of care/management, counseling/educating on disease processes, explaining results of diagnostic testing.    Consultants notified (specialty, provider name, time): NSGY, Ortho Spine    ==============================================================================  PAST MEDICAL HISTORY:   PMH:   Past Medical History:   Diagnosis Date    Other specified postprocedural states     History of colonoscopy    Personal history of other diseases of the digestive system 04/27/2016    History of constipation       PSH:   Past Surgical History:   Procedure Laterality Date    COLONOSCOPY  04/27/2016    Complete Colonoscopy    HERNIA REPAIR  04/27/2016    Hernia Repair     FH:   No family history on file.  SOCIAL HISTORY:    Smoking:    Social History     Tobacco Use   Smoking Status Never   Smokeless Tobacco Never       Alcohol:    Social History     Substance and Sexual Activity   Alcohol Use Never       Drug use: Denies    MEDICATIONS:   Prior to Admission medications    Medication Sig Start Date End Date Taking? Authorizing Provider   amLODIPine (Norvasc) 5 mg tablet Take 1 tablet (5 mg) by mouth once daily. as directed 10/8/24   Allyn Solis MD   calcium carbonate-vitamin D3 600 mg-20 mcg (800 unit) tablet Take 1 tablet by mouth once daily.    Historical Provider, MD   cholecalciferol (Vitamin D-3) 50 mcg (2,000 unit) capsule Take by mouth.    Historical Provider, MD   citalopram (CeleXA) 40 mg tablet Take 1 tablet (40 mg) by mouth once daily. 10/8/24   Allyn Solis MD   docusate sodium (Colace) 100 mg capsule Take 1 capsule (100 mg) by mouth once daily at bedtime. 6/8/21   Historical Provider, MD   donepezil (Aricept) 10 mg tablet Take 1 tablet (10 mg) by mouth once daily at bedtime. 10/8/24   Allyn Solis MD   levothyroxine (Synthroid, Levoxyl) 75 mcg tablet Take 1 tablet (75 mcg) by mouth once daily. as directed 10/8/24 10/8/25  Allyn Solis  MD   LORazepam (Ativan) 0.5 mg tablet Take 1 tablet (0.5 mg) by mouth 2 times a day as needed for anxiety (and agitation). 11/27/24 12/27/24  Allyn Solis MD   metoprolol succinate XL (Toprol-XL) 25 mg 24 hr tablet Take 1 tablet (25 mg) by mouth once daily. Do not crush or chew. 4/8/24 4/8/25  Allyn Solis MD   omega-3 fatty acids-fish oil 300-1,000 mg capsule Take 2 capsules (2,000 mg) by mouth once daily.    Historical Provider, MD   omeprazole (PriLOSEC) 20 mg DR capsule Take 1 capsule (20 mg) by mouth once daily. 10/8/24   Allyn Solis MD   simvastatin (Zocor) 20 mg tablet Take 1 tablet (20 mg) by mouth once daily. 10/8/24   Allyn Solis MD   sulfamethoxazole-trimethoprim (Bactrim DS) 800-160 mg tablet Take 1 tablet by mouth 2 times a day for 7 days. 1/9/25 1/16/25  Allyn Solis MD   tolterodine LA (Detrol LA) 2 mg 24 hr capsule Take 1 capsule (2 mg) by mouth once daily. 10/8/24 10/8/25  Allyn Solis MD     ALLERGIES:   No Known Allergies    REVIEW OF SYSTEMS:  Review of Systems   Unable to perform ROS: Dementia     PHYSICAL EXAM:  PRIMARY SURVEY:  Airway  Airway is patent.     Breathing  Breathing is normal. Right breath sounds are normal. Left breath sounds are normal.     Circulation  Cardiac rhythm is regular. Rate is regular.   Pulses  Radial: 2+ on the right; 2+ on the left.  Femoral: 2+ on the right; 2+ on the left.  Pedal: 2+ on the right; 2+ on the left.    Disability  Nikolay Coma Score  Eye:4   Verbal:4   Motor:6      14       Motor Strength   strength:  5/5 on the right  5/5 on the left  Dorsiflex strength:  5/5 on the right  5/5 on the left  Plantarflex strength:  5/5 on the right  5/5 on the left        SECONDARY SURVEY/PHYSICAL EXAM:  Physical Exam  Constitutional:       Appearance: She is not ill-appearing.   HENT:      Head: Normocephalic and atraumatic.      Right Ear: External ear normal.      Left Ear: External ear normal.      Mouth/Throat:       Pharynx: Oropharynx is clear.   Eyes:      Conjunctiva/sclera: Conjunctivae normal.   Cardiovascular:      Rate and Rhythm: Normal rate and regular rhythm.      Pulses: Normal pulses.      Heart sounds: Normal heart sounds.   Pulmonary:      Effort: Pulmonary effort is normal.   Chest:      Chest wall: No tenderness.   Abdominal:      Palpations: Abdomen is soft.      Tenderness: There is no abdominal tenderness. There is no guarding.   Musculoskeletal:         General: Normal range of motion.      Cervical back: Normal range of motion. No tenderness.   Skin:     General: Skin is warm and dry.      Capillary Refill: Capillary refill takes less than 2 seconds.   Neurological:      Mental Status: She is alert. She is disoriented.      Comments: Nonsensical speech   Psychiatric:         Attention and Perception: She is inattentive.         Mood and Affect: Affect is not labile.         Behavior: Behavior is agitated. Behavior is cooperative.         Cognition and Memory: Cognition is impaired. Memory is impaired.       IMAGING SUMMARY:  (summary of findings, not a copy of dictation)  CT Head/Face: Similar appearance of a mixed density collection over the left cerebral convexity measuring up to 2.0 cm in thickness. This likely represents an acute on chronic subdural hematoma. Mild underlying cortical sulcal effacement and rightward midline shift, similar to prior.   CT C-Spine: No acute fracture or traumatic subluxation of the cervical spine.   CT Chest/Abd/Pelvis: No acute abnormality of the chest, abdomen and pelvis. There is compression deformity along the inferior endplate of the T9   vertebral body with approximate 25% loss of vertebral body height. No acute fracture or traumatic subluxation of the lumbar spine.   CXR/PXR: Probable chronic lung changes.   Other(s): -    LABS:  Results from last 7 days   Lab Units 01/11/25  2317 01/11/25  1502   WBC AUTO x10*3/uL 8.8 7.1   HEMOGLOBIN g/dL 14.1 13.7   HEMATOCRIT %  38.6 39.4   PLATELETS AUTO x10*3/uL 253 237   NEUTROS PCT AUTO % 73.9 68.4   LYMPHS PCT AUTO % 13.6 18.8   MONOS PCT AUTO % 10.6 10.2   EOS PCT AUTO % 0.7 1.7     Results from last 7 days   Lab Units 01/11/25 2317   APTT seconds 31   INR  1.2*     Results from last 7 days   Lab Units 01/11/25  2317 01/11/25  1502   SODIUM mmol/L 139 139   POTASSIUM mmol/L 3.7 3.5   CHLORIDE mmol/L 105 106   CO2 mmol/L 20* 24   BUN mg/dL 18 21   CREATININE mg/dL 0.85 0.96   CALCIUM mg/dL 9.8 9.3   PROTEIN TOTAL g/dL 7.5 6.7   BILIRUBIN TOTAL mg/dL 0.8 0.4   ALK PHOS U/L 76 78   ALT U/L 8 8   AST U/L 15 13   GLUCOSE mg/dL 102* 105*     Results from last 7 days   Lab Units 01/11/25 2317 01/11/25  1502   BILIRUBIN TOTAL mg/dL 0.8 0.4           I have reviewed all laboratory and imaging results ordered/pertinent for this encounter.

## 2025-01-12 NOTE — SIGNIFICANT EVENT
I received call for transfer.  Patient did have unclear history of head injury but did have a fall reported yesterday.  Found to have acute on chronic subdural and accepted by neurosurgery for evaluation for possible minimally invasive drainage

## 2025-01-12 NOTE — ED PROVIDER NOTES
Emergency Department Provider Note        History of Present Illness     History provided by: Son and triage note.  Limitations to History: Altered Mental Status and Dementia  External Records Reviewed with Brief Summary: Radiology report of CT head from [facility] which showed acute on chronic subdural hemorrhage.    HPI:  Gabrielle Turner is a 89 y.o. female with history of HTN, HLD, hypothyroidism, GERD, BPPV, PVD, UTI, and Alzheimer's dementia who was transferred from [facility] to the ED for acute on chronic subdural hemorrhage after a recent fall at home.  Patient was seen and evaluated for altered mental status at [facility], where she was found to have subdural hemorrhage on CT head.  I called her son who states that she had dementia progressively got worse since 3 years ago when she lost her .  Stated that she used to be able to dress herself, go to the bathroom, feed herself, and seats with the family members watching TV.  Stated that in the last week she has become noticeably shaky and having problems speaking.  Stated that she lives with his sister who has ALS and unable to take care of at home.  Stated that she had no recent fever but have had intermittent episodes of vomiting.  Patient denies any pain.    Physical Exam   Triage vitals:  T 36.3 °C (97.3 °F)  HR 93  /70  RR 19  O2 98 % None (Room air)    Physical Exam  Vitals and nursing note reviewed.   Constitutional:       General: She is not in acute distress.     Appearance: She is well-developed and normal weight. She is not ill-appearing or toxic-appearing.   HENT:      Head: Normocephalic and atraumatic.   Eyes:      General: No scleral icterus.     Extraocular Movements: Extraocular movements intact.      Right eye: Normal extraocular motion.      Left eye: Normal extraocular motion.      Pupils: Pupils are equal, round, and reactive to light.   Cardiovascular:      Rate and Rhythm: Normal rate and regular rhythm.      Heart  sounds: Normal heart sounds. No murmur heard.     No friction rub. No gallop.   Pulmonary:      Effort: Pulmonary effort is normal. No respiratory distress.      Breath sounds: Normal breath sounds. No stridor. No wheezing, rhonchi or rales.   Abdominal:      General: There is no distension.      Palpations: Abdomen is soft.      Tenderness: There is no abdominal tenderness. There is no guarding.   Musculoskeletal:      Cervical back: Normal range of motion and neck supple. No rigidity.   Skin:     General: Skin is warm.      Capillary Refill: Capillary refill takes less than 2 seconds.      Coloration: Skin is not cyanotic or pale.   Neurological:      Mental Status: She is alert. Mental status is at baseline.      GCS: GCS eye subscore is 4. GCS verbal subscore is 5. GCS motor subscore is 6.      Cranial Nerves: No facial asymmetry.      Motor: No weakness.   Psychiatric:         Mood and Affect: Mood is not anxious.         Behavior: Behavior is not agitated.         Cognition and Memory: Memory is not impaired.          Medical Decision Making & ED Course   Medical Decision Makin y.o. female with history of HTN, HLD, hypothyroidism, GERD, BPPV, PVD, UTI, and Alzheimer's dementia who was transferred from [facility] to the ED for acute on chronic subdural hemorrhage after a recent fall at home.  On arrival to the ED, the patient was stable, A&Ox1, non-toxic, well-appearing, and in no acute distress.     My personal assessment is directed towards repeating CT head as recommended by neurosurgery, and consulting trauma surgery for evaluation for admission.  Basic labs were ordered including CBC, CMP, and coags to rule out anemia, electrolyte abnormality.   Lab shows no evidence of significant anemia, and chemistry panel is unremarkable.  PT/INR is slightly elevated.    Repeat CT head shows similar appearance of a mixed density collection over the left cerebral convexity measuring up to 2.0 cm in thickness. This  likely represents an acute on chronic subdural hematoma. Mild underlying cortical sulcal effacement and rightward midline shift, similar to prior. There is moderate diffuse parenchymal volume loss and periventricular white matter changes of chronic microvascular ischemia.    Patient was seen by neurosurgery and after evaluation and review of the CT result, neurosurgery signed off given that there is no indication for surgical procedure.  Trauma surgery was consulted.  Patient was evaluated by trauma surgery team recommendations are pending.    Patient was reported to be restless, requiring the bedside sitter given high risk for fall.  Also reported that she was becoming agitated.  Patient was administered 0.5 mg of Ativan which is her normal home dose.  ----    Differential diagnoses considered include but are not limited to: Acute on chronic subarachnoid hemorrhage.    Care Considerations: As documented above in [facility]    ED Course:  Diagnoses as of 01/12/25 1801   SDH (subdural hematoma) (Multi)     Disposition   Patient was signed out to Dr. Calhoun.    Procedures   Procedures    This was a shared visit with an ED attending.  The patient was seen and discussed with the ED attending Dr. Murphy.    Sunday Chandler MD  Emergency Medicine, PGY-2     Sunday Chandler MD  Resident  01/12/25 1801

## 2025-01-13 ENCOUNTER — APPOINTMENT (OUTPATIENT)
Dept: RADIOLOGY | Facility: HOSPITAL | Age: 89
End: 2025-01-13
Payer: MEDICARE

## 2025-01-13 LAB
25(OH)D3 SERPL-MCNC: 85 NG/ML (ref 30–100)
ALBUMIN SERPL BCP-MCNC: 4 G/DL (ref 3.4–5)
ANION GAP SERPL CALC-SCNC: 17 MMOL/L (ref 10–20)
ATRIAL RATE: 63 BPM
BUN SERPL-MCNC: 26 MG/DL (ref 6–23)
CALCIUM SERPL-MCNC: 10 MG/DL (ref 8.6–10.6)
CHLORIDE SERPL-SCNC: 102 MMOL/L (ref 98–107)
CO2 SERPL-SCNC: 21 MMOL/L (ref 21–32)
CREAT SERPL-MCNC: 1.41 MG/DL (ref 0.5–1.05)
EGFRCR SERPLBLD CKD-EPI 2021: 36 ML/MIN/1.73M*2
ERYTHROCYTE [DISTWIDTH] IN BLOOD BY AUTOMATED COUNT: 12.1 % (ref 11.5–14.5)
GLUCOSE SERPL-MCNC: 117 MG/DL (ref 74–99)
HCT VFR BLD AUTO: 44 % (ref 36–46)
HGB BLD-MCNC: 15.2 G/DL (ref 12–16)
MAGNESIUM SERPL-MCNC: 1.96 MG/DL (ref 1.6–2.4)
MCH RBC QN AUTO: 33.1 PG (ref 26–34)
MCHC RBC AUTO-ENTMCNC: 34.5 G/DL (ref 32–36)
MCV RBC AUTO: 96 FL (ref 80–100)
NRBC BLD-RTO: 0 /100 WBCS (ref 0–0)
P AXIS: 67 DEGREES
PHOSPHATE SERPL-MCNC: 3.2 MG/DL (ref 2.5–4.9)
PLATELET # BLD AUTO: 295 X10*3/UL (ref 150–450)
POTASSIUM SERPL-SCNC: 3.8 MMOL/L (ref 3.5–5.3)
PR INTERVAL: 175 MS
Q ONSET: 249 MS
QRS COUNT: 11 BEATS
QRS DURATION: 86 MS
QT INTERVAL: 468 MS
QTC CALCULATION(BAZETT): 480 MS
QTC FREDERICIA: 475 MS
R AXIS: 44 DEGREES
RBC # BLD AUTO: 4.59 X10*6/UL (ref 4–5.2)
SODIUM SERPL-SCNC: 136 MMOL/L (ref 136–145)
T AXIS: 64 DEGREES
T OFFSET: 483 MS
TSH SERPL-ACNC: 14.03 MIU/L (ref 0.44–3.98)
VENTRICULAR RATE: 63 BPM
VIT B12 SERPL-MCNC: 331 PG/ML (ref 211–911)
WBC # BLD AUTO: 8.4 X10*3/UL (ref 4.4–11.3)

## 2025-01-13 PROCEDURE — 82607 VITAMIN B-12: CPT

## 2025-01-13 PROCEDURE — 72070 X-RAY EXAM THORAC SPINE 2VWS: CPT

## 2025-01-13 PROCEDURE — 72070 X-RAY EXAM THORAC SPINE 2VWS: CPT | Performed by: RADIOLOGY

## 2025-01-13 PROCEDURE — 36415 COLL VENOUS BLD VENIPUNCTURE: CPT

## 2025-01-13 PROCEDURE — 99231 SBSQ HOSP IP/OBS SF/LOW 25: CPT | Performed by: SURGERY

## 2025-01-13 PROCEDURE — 2500000004 HC RX 250 GENERAL PHARMACY W/ HCPCS (ALT 636 FOR OP/ED)

## 2025-01-13 PROCEDURE — 84443 ASSAY THYROID STIM HORMONE: CPT

## 2025-01-13 PROCEDURE — 2500000002 HC RX 250 W HCPCS SELF ADMINISTERED DRUGS (ALT 637 FOR MEDICARE OP, ALT 636 FOR OP/ED)

## 2025-01-13 PROCEDURE — 97161 PT EVAL LOW COMPLEX 20 MIN: CPT | Mod: GP | Performed by: STUDENT IN AN ORGANIZED HEALTH CARE EDUCATION/TRAINING PROGRAM

## 2025-01-13 PROCEDURE — 72100 X-RAY EXAM L-S SPINE 2/3 VWS: CPT | Performed by: RADIOLOGY

## 2025-01-13 PROCEDURE — 99223 1ST HOSP IP/OBS HIGH 75: CPT | Performed by: INTERNAL MEDICINE

## 2025-01-13 PROCEDURE — 72100 X-RAY EXAM L-S SPINE 2/3 VWS: CPT

## 2025-01-13 PROCEDURE — 80069 RENAL FUNCTION PANEL: CPT

## 2025-01-13 PROCEDURE — 2500000001 HC RX 250 WO HCPCS SELF ADMINISTERED DRUGS (ALT 637 FOR MEDICARE OP)

## 2025-01-13 PROCEDURE — 82306 VITAMIN D 25 HYDROXY: CPT

## 2025-01-13 PROCEDURE — 85027 COMPLETE CBC AUTOMATED: CPT

## 2025-01-13 PROCEDURE — 1100000001 HC PRIVATE ROOM DAILY

## 2025-01-13 PROCEDURE — 83735 ASSAY OF MAGNESIUM: CPT

## 2025-01-13 RX ORDER — ACETAMINOPHEN 325 MG/1
975 TABLET ORAL EVERY 8 HOURS
Status: DISCONTINUED | OUTPATIENT
Start: 2025-01-13 | End: 2025-01-16 | Stop reason: HOSPADM

## 2025-01-13 RX ORDER — CITALOPRAM 20 MG/1
20 TABLET, FILM COATED ORAL DAILY
Status: DISCONTINUED | OUTPATIENT
Start: 2025-01-14 | End: 2025-01-16 | Stop reason: HOSPADM

## 2025-01-13 RX ORDER — ENOXAPARIN SODIUM 100 MG/ML
30 INJECTION SUBCUTANEOUS 2 TIMES DAILY
Status: DISCONTINUED | OUTPATIENT
Start: 2025-01-13 | End: 2025-01-16 | Stop reason: HOSPADM

## 2025-01-13 RX ORDER — SENNOSIDES 8.6 MG/1
1 TABLET ORAL NIGHTLY
Status: DISCONTINUED | OUTPATIENT
Start: 2025-01-13 | End: 2025-01-16 | Stop reason: HOSPADM

## 2025-01-13 RX ADMIN — CITALOPRAM HYDROBROMIDE 40 MG: 40 TABLET ORAL at 09:09

## 2025-01-13 RX ADMIN — DOCUSATE SODIUM 100 MG: 100 CAPSULE, LIQUID FILLED ORAL at 09:10

## 2025-01-13 RX ADMIN — POLYETHYLENE GLYCOL 3350 17 G: 17 POWDER, FOR SOLUTION ORAL at 12:05

## 2025-01-13 RX ADMIN — PANTOPRAZOLE SODIUM 40 MG: 40 TABLET, DELAYED RELEASE ORAL at 06:05

## 2025-01-13 RX ADMIN — ACETAMINOPHEN 650 MG: 325 TABLET ORAL at 12:05

## 2025-01-13 RX ADMIN — METOPROLOL TARTRATE 12.5 MG: 25 TABLET, FILM COATED ORAL at 09:09

## 2025-01-13 RX ADMIN — LEVOTHYROXINE SODIUM 75 MCG: 75 TABLET ORAL at 09:09

## 2025-01-13 RX ADMIN — SIMVASTATIN 20 MG: 20 TABLET, FILM COATED ORAL at 09:10

## 2025-01-13 RX ADMIN — OXYBUTYNIN CHLORIDE 5 MG: 5 TABLET ORAL at 09:10

## 2025-01-13 RX ADMIN — AMLODIPINE BESYLATE 5 MG: 5 TABLET ORAL at 09:10

## 2025-01-13 ASSESSMENT — COGNITIVE AND FUNCTIONAL STATUS - GENERAL
CLIMB 3 TO 5 STEPS WITH RAILING: A LITTLE
MOVING TO AND FROM BED TO CHAIR: A LITTLE
DRESSING REGULAR UPPER BODY CLOTHING: A LITTLE
DRESSING REGULAR UPPER BODY CLOTHING: A LITTLE
STANDING UP FROM CHAIR USING ARMS: A LOT
MOBILITY SCORE: 20
WALKING IN HOSPITAL ROOM: A LITTLE
MOVING FROM LYING ON BACK TO SITTING ON SIDE OF FLAT BED WITH BEDRAILS: A LOT
EATING MEALS: A LITTLE
PERSONAL GROOMING: A LITTLE
TURNING FROM BACK TO SIDE WHILE IN FLAT BAD: A LOT
WALKING IN HOSPITAL ROOM: A LITTLE
TOILETING: A LITTLE
STANDING UP FROM CHAIR USING ARMS: A LITTLE
DAILY ACTIVITIY SCORE: 19
PERSONAL GROOMING: A LITTLE
CLIMB 3 TO 5 STEPS WITH RAILING: A LOT
CLIMB 3 TO 5 STEPS WITH RAILING: A LITTLE
STANDING UP FROM CHAIR USING ARMS: A LITTLE
TOILETING: A LITTLE
MOBILITY SCORE: 13
EATING MEALS: A LITTLE
MOVING TO AND FROM BED TO CHAIR: A LOT
WALKING IN HOSPITAL ROOM: A LITTLE
MOBILITY SCORE: 20
MOVING TO AND FROM BED TO CHAIR: A LITTLE
HELP NEEDED FOR BATHING: A LITTLE
HELP NEEDED FOR BATHING: A LITTLE
DAILY ACTIVITIY SCORE: 19

## 2025-01-13 ASSESSMENT — PAIN SCALES - GENERAL
PAINLEVEL_OUTOF10: 0 - NO PAIN

## 2025-01-13 ASSESSMENT — ACTIVITIES OF DAILY LIVING (ADL): ADL_ASSISTANCE: INDEPENDENT

## 2025-01-13 NOTE — PROGRESS NOTES
Gabrielle Turner is a 89 y.o. female on day 1 of admission presenting with SDH (subdural hematoma) (Multi).    Subjective   No acute events overnight       Objective     Physical Exam  Neuro: Awake, minimally interactive  Ox1  names 1/3, repetition intact  Follows commands greater than antigravity symmetrically    Last Recorded Vitals  Blood pressure 129/74, pulse 59, temperature 36.1 °C (97 °F), temperature source Temporal, resp. rate 16, height 1.524 m (5'), weight 51.7 kg (114 lb), SpO2 98%.  Intake/Output last 3 Shifts:  No intake/output data recorded.               Assessment/Plan   Assessment & Plan  SDH (subdural hematoma) (Multi)    89yF h/o HTN, HLD, hypothyroidism, alzheimer's, GERD, prior T9 compression fx, anxiety, p/w aphasia, lethargy for 5d, fall yesterday, UTI positive on home kit and started on bactrim 2d ago, CTH 1.8cm mixed density L convexity SDH, rCTH stable. EEG neg, dc'd    Plan:  Trauma primary  Will consider undergoing MMA embolization, if family is on board as well          Demar Franklin MD

## 2025-01-13 NOTE — PROGRESS NOTES
Patient was evaluated by PT and recommended moderate intensity. Patient still needs an OT eval, but will discharge to SNF, if agreeable. SW to speak with the patient about SNF recommendations and offer choices. SW will continue to follow to facilitate discharge plan.       NERY Francois

## 2025-01-13 NOTE — CARE PLAN
The patient's goals for the shift include  unable to assess.    The clinical goals for the shift include pt will remain free from falls/injury for duration of shift ending 1/13/25 at 1930.    Over the shift, the patient made progress toward the following goals. Barriers to progression include pt confusion. Recommendations to address these barriers include frequent redirection, increased rounding, possible 1 to 1 supervision. Pt bed alarm on and call light within reach.

## 2025-01-13 NOTE — PROGRESS NOTES
Physical Therapy    Physical Therapy Evaluation    Patient Name: Gabrielle Turner  MRN: [id number]  Room: 8008007-A  Today's Date: 1/13/2025   Time Calculation  Start Time: 0905  Stop Time: 0938  Time Calculation (min): 33 min    Assessment/Plan   PT Assessment  PT Assessment Results: Decreased strength, Decreased endurance, Impaired balance, Decreased mobility, Decreased coordination, Decreased cognition, Impaired judgement, Decreased safety awareness, Impaired sensation  Rehab Prognosis: Fair  Barriers to Discharge Home: Cognition needs, Caregiver assistance  Caregiver Assistance: Patient lives alone and/or does not have reliable caregiver assistance  End of Session Communication: Bedside nurse  End of Session Patient Position: Bed, 3 rail up, Alarm on  IP OR SWING BED PT PLAN  Inpatient or Swing Bed: Inpatient  PT Plan  Treatment/Interventions: Bed mobility, Transfer training, Gait training, Stair training, Balance training, Neuromuscular re-education, Strengthening, Endurance training, Therapeutic exercise, Therapeutic activity, Home exercise program  PT Plan: Ongoing PT  PT Frequency: 3 times per week  PT Discharge Recommendations: Moderate intensity level of continued care  Equipment Recommended upon Discharge: Wheeled walker  PT Recommended Transfer Status: Contact guard, Assistive device  PT - OK to Discharge: Yes    Subjective      General Visit Information:  Subjective: Pt alert and agreeable to PT, however confused as to why she is in the hospital. Mentioned living with spouse, however per son,  passed away 3 years ago. Difficulty with recollection of events prior to hospital stay as well as answering basic PLOF questions. Slight tremor noted.     Reason for Referral: Acute SDH, T9 Inferior Endplate Deformity  Past Medical History Relevant to Rehab: Pt arrived as a transfer after being found on her hands and knees at home with presumed unwitnessed fall. Per patient family she has a history of  dementia and at best is alert to self only.  Prior to Session Communication: Bedside nurse  Patient Position Received: Bed, 3 rail up, Alarm on  Family/Caregiver Present: No   Home Living:  Home Living  Type of Home: House  Lives With: Adult children (daughter (unable to care for her))  Home Adaptive Equipment: Walker rolling or standard  Home Living Comments: unable to assess on this date  Prior Level of Function:  Prior Function Per Pt/Caregiver Report  Level of Andrew: Independent with ADLs and functional transfers  Receives Help From: Family  ADL Assistance: Independent  Ambulatory Assistance: Independent  Prior Function Comments: Per son, independent with all ADL's but [id number] has declined over past 3 years with recent upstick within last week  Precautions:  Precautions  LE Weight Bearing Status: Weight Bearing as Tolerated  Medical Precautions: Fall precautions  Vital Signs:  Pre Vitals      Post Vitals      Lines/Tubes/Drains:  External Urinary Catheter (Active)   Number of days: 1        Continuous Medications/Drips:       Objective   Pain:  Pain Assessment  0-10 (Numeric) Pain Score: 0 - No pain (post 0)    Cognition:  Cognition  Orientation Level: Disoriented to place, Disoriented to time, Disoriented to situation    General Assessments:  Extremity/Trunk Assessments:  Tone: No abnormalities noted     Coordination  Movements are Fluid and Coordinated: No  Coordination Comment: Unable to perform bilateral UE opposition  Upper Extremity  ROM: WNL  Strength:WFL  Lower Extremity  ROM: WNL  Strength: Not WFL RLE: Hip flexion 4/5, Knee flexion 4/5, Knee extension 4/5, PF 4/5, DF 4/5  LLE: Hip flexion 4/5, Knee flexion 4/5, Knee extension 4/5, PF 4/5, DF 4/5   Sitting Static Balance Normal  Sitting Dynamic Balance Not NormalAssist Level Contact Guard  Standing Static Balance Not NormalUE Support Two UE support and Assist Level Contact Guard  Standing Dynamic Balance Not NormalUE Support Two UE support and  Assist Level Contact Guard    Functional Assessments:  Bed Mobility  Bed Mobility: Yes  Bed Mobility 1  Bed Mobility 1: Supine to sitting  Level of Assistance 1: Moderate verbal cues, Moderate tactile cues, Moderate assistance  Bed Mobility Comments 1: x1  Bed Mobility 2  Bed Mobility  2: Sitting to supine  Level of Assistance 2: Moderate assistance, Moderate verbal cues, Moderate tactile cues  Bed Mobility Comments 2: x1    Transfers  Transfer: Yes  Transfer 1  Transfer From 1: Sit to  Transfer to 1: Stand  Transfer Device 1: Walker  Transfer Level of Assistance 1: Moderate verbal cues, Moderate tactile cues, Moderate assistance  Trials/Comments 1: x2  Transfers 2  Transfer From 2: Stand to  Transfer to 2: Sit  Transfer Device 2: Walker  Transfer Level of Assistance 2: Minimum assistance, Moderate verbal cues, Moderate tactile cues  Trials/Comments 2: x2  Transfers 3  Transfer From 3: Bed to  Transfer to 3: Bed  Transfer Device 3: Walker  Transfer Level of Assistance 3: Moderate verbal cues, Moderate tactile cues  Trials/Comments 3: x1    Ambulation/Gait Training  Ambulation/Gait Training Performed: Yes  Ambulation/Gait Training 1  Surface 1: Level tile  Device 1: Rolling walker  Assistance 1: Contact guard  Quality of Gait 1: Narrow base of support, Inconsistent stride length, Decreased step length, Forward flexed posture  Comments/Distance (ft) 1: x15 feet              Outcome Measures:  [facility] Basic Mobility  Turning from your back to your side while in a flat bed without using bedrails: A lot  Moving from lying on your back to sitting on the side of a flat bed without using bedrails: A lot  Moving to and from bed to chair (including a wheelchair): A lot  Standing up from a chair using your arms (e.g. wheelchair or bedside chair): A lot  To walk in hospital room: A little  Climbing 3-5 steps with railing: A lot  Basic Mobility - Total Score: 13                 Tinetti  Sitting Balance: Leans or slides in  chair  Arises: Able, uses arms to help  Attempts to Arise: Able to arise, one attempt  Immediate Standing Balance (First 5 Seconds): Steady but uses walker or other support  Standing Balance: Steady but wide stance, uses cane or other support  Nudged: Begins to fall  Eyes Closed: Unsteady  Turned 360 Degrees: Steadiness: Unsteady (Grabs, staggers)  Turned 360 Degrees: Continuity of Steps: Continuous  Sitting Down: Uses arms or not a smooth motion  Balance Score: 7  Initiation of Gait: No hesitancy  Step Height: R Swing Foot: Right foot complete clears floor  Step Length: R Swing Foot: Does not pass left stance foot with step  Step Height: L Swing Foot: Left foot complete clears floor  Step Length: L Swing Foot: Does not pass right stance foot with step  Step Symmetry: Right and left step appear equal  Step Continuity: Steps appear continuous  Path: Mild/moderate deviation or uses walking aid  Trunk: Marked sway or uses walking aid  Walking Time: Heels apart  Gait Score: 6  Total Score: 13          Encounter Problems       Encounter Problems (Active)       PT Problem       Patient will complete supine to sit and sit to supine Supervision        Start:  01/13/25    Expected End:  01/27/25            Patient will perform sit<>stand transfer with LRAD, and Supervision        Start:  01/13/25    Expected End:  01/27/25            Patient will ambulate >100' with LRAD and Supervision        Start:  01/13/25    Expected End:  01/27/25            Patient will ascend/descend 3 steps with Right Rail Ascending and supervision        Start:  01/13/25    Expected End:  01/27/25               Pain - Adult            Assessment: Pt is a 89 y.o. 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 Mod assist for functional activities, mod visual/verbal/tactile cues needed for  maximal pt performance. Pt is a high falls risk based on Tinetti score, gait assessment, and impaired cognition. Main limiting factors include impaired balance and cognition. Patient would benefit from further therapy to increase functional independence and safety.  Will continue to follow during acute stay.     Education Documentation  Precautions, taught by LEXY Zapata at 1/13/2025 10:52 AM.  Learner: Patient  Readiness: Acceptance  Method: Explanation  Response: Needs Reinforcement  Comment: POC    Body Mechanics, taught by LEXY Zapata at 1/13/2025 10:52 AM.  Learner: Patient  Readiness: Acceptance  Method: Explanation  Response: Needs Reinforcement  Comment: POC    Mobility Training, taught by LEXY Zapata at 1/13/2025 10:52 AM.  Learner: Patient  Readiness: Acceptance  Method: Explanation  Response: Needs Reinforcement  Comment: POC    Education Comments  No comments found.          01/13/25 at 10:57 AM   LEXY ZAPATA   Rehab Office: [phone]

## 2025-01-13 NOTE — CONSULTS
"Nutrition Initial Assessment:   Nutrition Assessment    Reason for Assessment: Admission nursing screening    Patient is a 89 y.o. female presenting with s/p fall at home. Pt with hx of osteoporosis, HTN, HLD, hypothyroidism, GERD, BPPV, PVD, UTI, and dementia who is presenting as transfer from OSH with acute on chronic subdural hematoma and T9 compression deformity     Nutrition History:  Food and Nutrient History: Met with pt's family-- report that pt has been eating close to her baseline which is 3 meals/day at home. Does not snack, per family pt is concerned about gaining weight and frequently says she is \"fat.\" Have tried to get her to drink chocolate milk etc but pt dislikes. Agreeable to trialing Gelatein protein supplement as pt likes jello.  Food Allergies/Intolerances:  None  GI Symptoms: None  Oral Problems: None       Anthropometrics:  Height: 152.4 cm (5')   Weight: 51.7 kg (114 lb)   BMI (Calculated): 22.26  IBW/kg (Dietitian Calculated): 45.5 kg  Weight History:   Wt Readings from Last 10 Encounters:   01/12/25 51.7 kg (114 lb)   01/11/25 52 kg (114 lb 10.2 oz)   10/08/24 53.5 kg (118 lb)   07/29/24 59 kg (130 lb)   07/03/24 59 kg (130 lb)   06/20/24 59 kg (130 lb)   06/17/24 59 kg (130 lb)   06/11/24 59 kg (130 lb)   04/08/24 57.9 kg (127 lb 9.6 oz)   10/06/23 59 kg (130 lb)     Weight Change %: 10.7% wt loss x9 months       Nutrition Focused Physical Exam Findings:    Subcutaneous Fat Loss:   Orbital Fat Pads: Mild-Moderate (slight dark circles and slight hollowing)  Buccal Fat Pads: Mild-Moderate (flat cheeks, minimal bounce)  Muscle Wasting:  Temporalis: Mild-Moderate (slight depression)  Pectoralis (Clavicular Region): Mild-Moderate (some protrusion of clavicle)  Deltoid/Trapezius: Mild-Moderate (slight protrusion of acromion process)  Edema:  Edema: none      Nutrition Significant Labs:  BG POCT trend:    , Renal Lab Trend:   Results from last 7 days   Lab Units 01/11/25  2317 01/11/25  1502 "   POTASSIUM mmol/L 3.7 3.5   SODIUM mmol/L 139 139   MAGNESIUM mg/dL  --  1.72   EGFR mL/min/1.73m*2 66 57*   BUN mg/dL 18 21   CREATININE mg/dL 0.85 0.96        Nutrition Specific Medications:  Scheduled medications  docusate sodium, 100 mg, oral, Daily  levothyroxine, 75 mcg, oral, Daily  pantoprazole, 40 mg, oral, Daily before breakfast  polyethylene glycol, 17 g, oral, Daily    I/O:    ;      Dietary Orders (From admission, onward)       Start     Ordered    01/12/25 2223  May Participate in Room Service With Assistance  ( ROOM SERVICE MAY PARTICIPATE WITH ASSISTANCE)  Once        Question:  .  Answer:  Yes    01/12/25 2222 01/12/25 0205  Adult diet Regular  Diet effective now        Question:  Diet type  Answer:  Regular    01/12/25 0207                     Estimated Needs:   Total Energy Estimated Needs (kCal):  (4697-1438)  Method for Estimating Needs: 25-30 kcal/kg  Total Protein Estimated Needs (g):  (60-70)  Method for Estimating Needs: 1.2-1.4 g/kg current wt  Total Fluid Estimated Needs (mL):  (per trauma surgery)        Nutrition Diagnosis   Malnutrition Diagnosis  Patient has Malnutrition Diagnosis: Yes  Diagnosis Status: New  Malnutrition Diagnosis: Moderate malnutrition related to chronic disease or condition  As Evidenced by: mild to moderate muscle loss and subcutaneous fat loss.            Nutrition Interventions/Recommendations         Nutrition Prescription:  Individualized Nutrition Prescription Provided for : oral diet        Nutrition Interventions:   General healthful diet   Continue with regular diet  Medical Food Supplement: Commercial food  Gelatein Plus supplement BID (160 kcal, 20 g protein each)      Nutrition Monitoring and Evaluation   Food/Nutrient Related History Monitoring  Monitoring and Evaluation Plan: Amount of food  Amount of Food: Estimated amout of food, Medical food intake  Criteria: pt will consume >50%        Time Spent (min): 45 minutes

## 2025-01-13 NOTE — PROGRESS NOTES
Pharmacy Medication History Review    Gabrielle Turner is a 89 y.o. female admitted for SDH (subdural hematoma) (Multi). Pharmacy reviewed the patient's [id number] medications and allergies for accuracy.    Medications ADDED:  None   Medications CHANGED:  None   Medications REMOVED:   None      The list below reflects the updated PTA list.   Prior to Admission Medications   Prescriptions Last Dose Informant   LORazepam (Ativan) 0.5 mg tablet  Other   Sig: Take 1 tablet (0.5 mg) by mouth 2 times a day as needed for anxiety (and agitation).   amLODIPine (Norvasc) 5 mg tablet  Other   Sig: Take 1 tablet (5 mg) by mouth once daily. as directed   calcium carbonate-vitamin D3 600 mg-20 mcg (800 unit) tablet  Other   Sig: Take 1 tablet by mouth once daily.   cholecalciferol (Vitamin D-3) 50 mcg (2,000 unit) capsule  Other   Sig: Take 1 capsule (50 mcg) by mouth early in the morning..   citalopram (CeleXA) 40 mg tablet  Other   Sig: Take 1 tablet (40 mg) by mouth once daily.   docusate sodium (Colace) 100 mg capsule  Other   Sig: Take 1 capsule (100 mg) by mouth once daily at bedtime.   donepezil (Aricept) 10 mg tablet  Other   Sig: Take 1 tablet (10 mg) by mouth once daily at bedtime.   levothyroxine (Synthroid, Levoxyl) 75 mcg tablet  Other   Sig: Take 1 tablet (75 mcg) by mouth once daily. as directed   metoprolol succinate XL (Toprol-XL) 25 mg 24 hr tablet  Other   Sig: Take 1 tablet (25 mg) by mouth once daily. Do not crush or chew.   omega-3 fatty acids-fish oil 300-1,000 mg capsule  Other   Sig: Take 2 capsules (2,000 mg) by mouth once daily.   omeprazole (PriLOSEC) 20 mg DR capsule  Other   Sig: Take 1 capsule (20 mg) by mouth once daily.   simvastatin (Zocor) 20 mg tablet  Other   Sig: Take 1 tablet (20 mg) by mouth once daily.   sulfamethoxazole-trimethoprim (Bactrim DS) 800-160 mg tablet  Other   Sig: Take 1 tablet by mouth 2 times a day for 7 days.   tolterodine LA (Detrol LA) 2 mg 24 hr capsule  Other  "  Sig: Take 1 capsule (2 mg) by mouth once daily.      Facility-Administered Medications: None        The list below reflects the updated allergy list. Please review each documented allergy for additional clarification and justification.  Allergies  Reviewed by Aris Franks RN on 1/11/2025   No Known Allergies         Patient was unable to be assessed for M2B at discharge. Not offered, dispo unknown.  Local pharmacy: [address] [[id number]]       Sources:   Patient interview not attempted because h/o dementia and AAOx1 at best per family and H&P.   Dispense history   [facility]RS   10/08/2024 PCP office visit     Additional Comments:  All prescription medications have been filled at a pharmacy recently (or slightly late to refill ~1 week).       Oskar Anderson, PharmD  Transitions of Care Pharmacist  01/13/25 4:03 AM     Secure Chat preferred   If no response call [phone] or Vocera \"Med Rec\"   "

## 2025-01-13 NOTE — CONSULTS
Inpatient consult to Geriatric Medicine  Consult performed by: Anisa White DO  Consult ordered by: Dasia Cee, MEERA-CNP  Reason for consult: advanced dementia and multiple co-morbidities          Primary Team: Trauma     Admit Date: 1/11/2025    Emergency Contact:   Extended Emergency Contact Information  Primary Emergency Contact: Rob Turner  Home Phone: [phone]  Relation: Child  Secondary Emergency Contact: Lashawn Turner  Mobile Phone: [phone]  Relation: Daughter     Reason For Consult: advanced dementia and multiple co-morbidities    History Of Present Illness:  Gabrielle Turner is a 89 y.o. female with hx of osteoporosis, HTN, HLD, hypothyroidism, GERD, BPPV, PVD, UTI, and dementia who is presenting as transfer from OSH with acute on chronic subdural hematoma and T9 compression deformity.    History per patient:  Patient is pleasantly demented, unable to obtain history.     History per family/caregiver: (obtained in addition due to patient’s advanced dementia)  History obtained by son over telephone. He reports that Gabrielle has been staying with daughter (has ALS-wheelchair bound) for 2 years.  Son reports that about 3 days ago the patient's mental status started acutely worsening. Patient was unable to speak in full sentences, feed herself, or toilet. Daughter also reports the patient began shaking/trembling prior to her fall.  The patient was discovered on her hands and knees in the bathroom floor, after an unwitnessed fall.  At that point EMS was called and patient presented to [facility].  She was found to have an acute on chronic subdural hematoma and was transferred to [facility] for further evaluation by neurosurgery.  Notably, the patient was diagnosed with dementia about 4 to 5 years ago, but worsened when her  passed away about 3 years ago.  He reports that Gabrielle is usually pretty independent when it comes to her ADLs, and is usually very chatty, however patient has not been able to  feed, dress, toilet herself very well for the last few days or converse at her baseline.     Daughter and son were present at bedside during rounds and report that they would like to consider placement as daughter does not feel she can adequately care for patient at home.    What matters most to the patient: Patient unable to give history.    Prior to Admission Meds  Prior to Admission Medications   Prescriptions Last Dose Informant Patient Reported? Taking?   LORazepam (Ativan) 0.5 mg tablet   No No   Sig: Take 1 tablet (0.5 mg) by mouth 2 times a day as needed for anxiety (and agitation).   amLODIPine (Norvasc) 5 mg tablet  Other No No   Sig: Take 1 tablet (5 mg) by mouth once daily. as directed   calcium carbonate-vitamin D3 600 mg-20 mcg (800 unit) tablet  Other Yes No   Sig: Take 1 tablet by mouth once daily.   cholecalciferol (Vitamin D-3) 50 mcg (2,000 unit) capsule  Other Yes No   Sig: Take 1 capsule (50 mcg) by mouth early in the morning..   citalopram (CeleXA) 40 mg tablet  Other No No   Sig: Take 1 tablet (40 mg) by mouth once daily.   docusate sodium (Colace) 100 mg capsule  Other Yes No   Sig: Take 1 capsule (100 mg) by mouth once daily at bedtime.   donepezil (Aricept) 10 mg tablet  Other No No   Sig: Take 1 tablet (10 mg) by mouth once daily at bedtime.   levothyroxine (Synthroid, Levoxyl) 75 mcg tablet  Other No No   Sig: Take 1 tablet (75 mcg) by mouth once daily. as directed   metoprolol succinate XL (Toprol-XL) 25 mg 24 hr tablet  Other No No   Sig: Take 1 tablet (25 mg) by mouth once daily. Do not crush or chew.   omega-3 fatty acids-fish oil 300-1,000 mg capsule  Other Yes No   Sig: Take 2 capsules (2,000 mg) by mouth once daily.   omeprazole (PriLOSEC) 20 mg DR capsule  Other No No   Sig: Take 1 capsule (20 mg) by mouth once daily.   simvastatin (Zocor) 20 mg tablet  Other No No   Sig: Take 1 tablet (20 mg) by mouth once daily.   sulfamethoxazole-trimethoprim (Bactrim DS) 800-160 mg tablet   Other No No   Sig: Take 1 tablet by mouth 2 times a day for 7 days.   tolterodine LA (Detrol LA) 2 mg 24 hr capsule  Other No No   Sig: Take 1 capsule (2 mg) by mouth once daily.      Facility-Administered Medications: None        Current Meds in Hospital  Current Facility-Administered Medications   Medication Dose Route Frequency Provider Last Rate Last Admin    acetaminophen (Tylenol) tablet 650 mg  650 mg oral q6h MARGOT Ahmad A Ece, APRN-CNP   650 mg at 01/12/25 0219    amLODIPine (Norvasc) tablet 5 mg  5 mg oral Daily Ahmad A Cee, APRN-CNP   5 mg at 01/12/25 0950    citalopram (CeleXA) tablet 40 mg  40 mg oral Daily Ahmad A Cee, APRN-CNP   40 mg at 01/12/25 0951    docusate sodium (Colace) capsule 100 mg  100 mg oral Daily Ahmad A Cee, APRN-CNP   100 mg at 01/12/25 0950    donepezil (Aricept) tablet 10 mg  10 mg oral Nightly Ahmad A Cee, APRN-CNP   10 mg at 01/12/25 2205    levothyroxine (Synthroid, Levoxyl) tablet 75 mcg  75 mcg oral Daily Ahmad A Cee, APRN-CNP   75 mcg at 01/12/25 0951    metoprolol tartrate (Lopressor) tablet 12.5 mg  12.5 mg oral BID Ahmad A Cee, APRN-CNP   12.5 mg at 01/12/25 2205    oxybutynin (Ditropan) tablet 5 mg  5 mg oral BID Ahmad A Cee, APRN-CNP   5 mg at 01/12/25 2205    oxygen (O2) therapy   inhalation Continuous PRN - O2/gases Ahmad A Cee, APRN-CNP        pantoprazole (ProtoNix) EC tablet 40 mg  40 mg oral Daily before breakfast Ahmad A Cee, APRN-CNP   40 mg at 01/13/25 0605    polyethylene glycol (Glycolax, Miralax) packet 17 g  17 g oral Daily Ahmad A Cee, APRN-CNP   17 g at 01/12/25 0950    simvastatin (Zocor) tablet 20 mg  20 mg oral Daily GRZEGORZ Mcneil   20 mg at 01/12/25 0950        The patient's outpatient electronic medical record (EMR) is reviewed, notable information includes .      Past Medical History  Past Medical History:   Diagnosis Date    Other specified postprocedural states     History of  colonoscopy    Personal history of other diseases of the digestive system 2016    History of constipation        Surgical History  Past Surgical History:   Procedure Laterality Date    COLONOSCOPY  2016    Complete Colonoscopy    HERNIA REPAIR  2016    Hernia Repair        Family History  Her family history is not on file.     Social History  She reports that she has never smoked. She has never used smokeless tobacco. She reports that she does not drink alcohol and does not use drugs.  -Alcohol use: No  -Tobacco use: No  -Illicit drug use: No  -Exercise: inside house walking w walker  -Spiritual needs: Yazidi - Restorationist  -Marital Status: .   about 3 year ago.  Occupation: None.   Highest Level of Education: High School  Community Resources: Home Health Aide helps with bathing and dressing  : No  Current living environment: No stairs, one step to get into home. Lives in a House. Uses a walker.     Activities of Daily Living:  Basic ADLs: (I= independent, A= assistance, D= dependent)  Bathing: D, Dressing: I, Toileting: I, Transferring: I, Continence: I, Feeding: I    Pascual Index:  4  Instrumental ADLs: (I= independent, A= assistance, D= dependent)  Ability to use phone: D, Shopping: D, Cooking: D, Housekeeping: D, Laundry: D, Transportation: D, Medications: D, Handle Finances: D   [place] Scale:  1    Allergies  Patient has no known allergies.    Review of Systems  Review of System:  Patient cannot answer questions effectively.     Documents on file and valid:  Advance Directive/Living Will: Yes   Health Care Power of : Yes -  Lashawn Cabrales  Other: Would not like a feeding tube  Code Status: DNR      Confusion Assessment Method (CAM)  Not on file.    Jeremie Cognitive Assessment (MoCA)  Not on file.    Geriatric Depression Scale (GDS)  Not on file.    Mini-Cog (Early Dementia Detection)  Not on file.    Folstein Mini-Mental State Exam (MMSE)  Not on  file.    Patient Health Questionnaire (PHQ-9)  Not on file.     Physical Exam  Constitutional:       General: She is not in acute distress.     Appearance: She is normal weight.   HENT:      Head: Normocephalic and atraumatic.      Mouth/Throat:      Mouth: Mucous membranes are moist.      Pharynx: Oropharynx is clear. No oropharyngeal exudate.   Eyes:      Extraocular Movements: Extraocular movements intact.      Conjunctiva/sclera: Conjunctivae normal.      Pupils: Pupils are equal, round, and reactive to light.   Cardiovascular:      Rate and Rhythm: Normal rate and regular rhythm.      Heart sounds: No murmur heard.  Pulmonary:      Effort: Pulmonary effort is normal.      Breath sounds: Normal breath sounds. No wheezing, rhonchi or rales.   Abdominal:      General: Abdomen is flat. Bowel sounds are normal. There is no distension.      Palpations: Abdomen is soft.      Tenderness: There is no abdominal tenderness. There is no guarding or rebound.   Musculoskeletal:      Cervical back: Neck supple. No rigidity or tenderness.   Skin:     General: Skin is warm and dry.   Neurological:      General: No focal deficit present.      Mental Status: She is alert. She is disoriented.      Sensory: No sensory deficit.      Motor: No weakness.      Comments: Only oriented to self.    Psychiatric:         Mood and Affect: Mood normal.         Behavior: Behavior normal.       Last Recorded Vitals      1/12/2025     2:56 PM 1/12/2025     4:00 PM 1/12/2025     7:00 PM 1/12/2025     8:05 PM 1/12/2025    10:32 PM 1/12/2025    11:45 PM 1/13/2025     6:00 AM   Vitals   Systolic 128 139 126 158  129    Diastolic 88 89 81 97  74    BP Location Right arm  Right arm Right arm  Right arm    Heart Rate 86 57 68 71  59    Temp      36.1 °C (97 °F) --   Resp 18 18 20 16  16    Height     1.524 m (5')     Weight (lb)     114     BMI     22.26 kg/m2     BSA (m2)     1.48 m2        Vitals:    01/12/25 2232   Weight: 51.7 kg (114 lb)         Confusion Assessment Method(CAM) for diagnosis of delirium:    1.  Acute onset or fluctuating course: present  2.  Inattention: present  3.  Disorganized thinking: present  4.  Altered level of consciousness: absent  CAM: positive    AT Score For Assessment of Delirium and Cognitive Impairment:    Alertness: 0  Normal(fully alert,but not agitated, throughout assessment)=0  Mild sleepiness for <10 seconds after walking, then normal=0  Clearly abnormal=4  2.  AMT4: 2  No mistakes=0  One mistake=1  Two or more mistakes/untestable=2  3.  Attention: 2  Achieves seven months or more correctly=0  Starts but scores <7 months/ refuses to start=1  Untestable(cannot start because unwell, drowsy, inattentive)=2  4.  Acute: 4  No=0  Yes=4    Total Score: 8  4 or above: Possible delirium +/- cognitive impairment  1-3: Possible cognitive impairment  0: Delirium or severe cognitive impairment unlikely(but delirium still possible if (4) information incomplete)     Relevant Results  Lab Results   Component Value Date    TSH 6.26 (H) 01/11/2025    CCPTZLIS80 245 06/30/2020    VITD25 58 06/30/2020     Results from last 7 days   Lab Units 01/11/25  2317 01/11/25  1502   WBC AUTO x10*3/uL 8.8 7.1   HEMOGLOBIN g/dL 14.1 13.7   HEMATOCRIT % 38.6 39.4   ALT U/L 8 8   AST U/L 15 13   SODIUM mmol/L 139 139   POTASSIUM mmol/L 3.7 3.5   CHLORIDE mmol/L 105 106   CREATININE mg/dL 0.85 0.96   BUN mg/dL 18 21   CO2 mmol/L 20* 24   INR  1.2*  --        Recent Imaging Results      ECG 12 lead  Sinus rhythm  Atrial premature complex  Left atrial enlargement  EEG  IMPRESSION    Impression    This EEG is indicative of a structural lesion in the left hemisphere. No epileptiform discharges are seen.    A full report will be scanned into the patient's chart at a later time.    This report has been interpreted and electronically signed by      Head/Brain Imaging  === Results for orders placed during the hospital encounter of 01/11/25 ===    CT head wo  IV contrast [OTD306] 01/11/2025    Status: Normal  1. Similar appearance of a mixed density collection over the left  cerebral convexity measuring up to 2.0 cm in thickness. This likely  represents an acute on chronic subdural hematoma. Mild underlying  cortical sulcal effacement and rightward midline shift, similar to  prior.  2. Moderate diffuse parenchymal volume loss and periventricular white  matter changes of chronic microvascular ischemia.    I personally reviewed the image(s)/study and resident interpretation  as stated by Dr. Noemí Castillo MD. I agree with the findings as  stated.    MACRO:  None    Signed by: Mauricio Goldstein 1/11/2025 11:37 PM  Dictation workstation:   [id number]  No results found for this or any previous visit.        DATA:  EKG: QTC  Encounter Date: 01/11/25   ECG 12 lead   Result Value    Ventricular Rate 63    Atrial Rate 63    IL Interval 175    QRS Duration 86    QT Interval 468    QTC Calculation(Bazett) 480    P Axis 67    R Axis 44    T Axis 64    QRS Count 11    Q Onset 249    T Offset 483    QTC Fredericia 475    Narrative    Sinus rhythm  Atrial premature complex  Left atrial enlargement     Anti-psychotics in 48 hours:  Opioids/Benzodiazepines in 48 hours: yes, 0.5mg Ativan at 22:25 on 1/12.  Anticholinergics on board:Yes - Oxybutinin 5mg  Restraints:No  Indwelling catheters:No  Last BM: No recorded BM on EMR, no reported BM according to nursing.  UO in 24 hours: None recorded, but has urinated since transfer to floor.  Activity in the past 24 hours: Pt has been sleeping  Need for ambulatory devices: walker, per son    Assessment/Plan   This is a/an 89 y.o. year old female, with past medical history relevant for osteoporosis, HTN, HLD, hypothyroidism, GERD, BPPV, PVD, UTI, and dementia, presenting for unwitnessed fall and found to have a SDH, being seen in geriatric consultation for advanced dementia and multiple co-morbidities.     Principal Problem:    SDH (subdural  hematoma) (Multi)    1. Acute fall, unwitnessed, with resultant fragility fractures  History of osteoporosis  - Trauma primary  - No acute fractures noted on CT cervical and lumbar spines. Thoracic spine shows T9 endplate fracture.  Plan:  Check vitamin D level  - Patient with hx of osteoporosis on chart review, will need outpatient follow up with endocrinology. No updated DEXA.  - Increase Tylenol to 975mg TID  - Ortho on board    2. Acute on Chronic SDH  - Stable on repeat CTH  Plan:   - NSGY on board    3. Dementia, severe, with deficits in IADLS and most BADLS  - Pt with advanced dementia, last documented MoCA was 15/30, currently taking donepezil  - Baseline orientation to self only  -Family/daughter unable to take care of her, thinking of memory care after subacute rehab  Plan:   - PT/OT eval  - Check Vitamin B12  - TSH 6.26, continue to trend    4. Polypharmacy  Plan:  - Discontinue docusate  - Add senna  - Discontinue oxybutynin with plan to resume Tolteridine upon discharged    5. Urine incontinence, on tolterodine. Switched to oxybutynin, which is highly anticholinergic  Plan:  Recommend DC oxybutynin  Use external urethral catheter    6. Depression  Plan:  - Max dose of Celexa in elderly with dementia is 20mg.  - Decrease Celexa from 40mg to 20mg daily    Care Transitions:  - Recommended level for discharge: placement to SNF  - Home going considerations:   - Primary care physician: Allyn Solis MD    Goals of Care:  - Health care power of : Lashawn Cabrales  Code status: DNR DNI    4M AGE-FRIENDLY INITIATIVE:  What matters most to patient: Safety at home  Medications: decrease celexa and discontinue oxybutynin  Mentation: oriented only to self at baseline  Mobility: ambulates with walker       Geriatric medicine will continue to follow the patient. Thank you for allowing geriatric medicine to be involved in the care of your patient. Geriatric medicine consultation team is available during  work hours Monday through Friday. For any emergency issues requiring immediate assistance over the weekend, please page Geriatrics pager [phone]    Consult Billing Time   Prep time on date of patient encounter(minutes): 30  Time directly with patient/family/caregiver(minutes): 20  Documentation time(minutes): 20  TOTAL TIME(minutes): 70 min    Patient was staffed with attending physician Dr. Herrera.  Anisa White DO  Family Medicine PGY-3  [facility]

## 2025-01-14 ENCOUNTER — APPOINTMENT (OUTPATIENT)
Dept: RADIOLOGY | Facility: HOSPITAL | Age: 89
End: 2025-01-14
Payer: MEDICARE

## 2025-01-14 DIAGNOSIS — S06.5XAA SDH (SUBDURAL HEMATOMA) (MULTI): Primary | ICD-10-CM

## 2025-01-14 LAB
ALBUMIN SERPL BCP-MCNC: 3.6 G/DL (ref 3.4–5)
ANION GAP SERPL CALC-SCNC: 16 MMOL/L (ref 10–20)
BUN SERPL-MCNC: 36 MG/DL (ref 6–23)
CALCIUM SERPL-MCNC: 9.4 MG/DL (ref 8.6–10.6)
CHLORIDE SERPL-SCNC: 102 MMOL/L (ref 98–107)
CO2 SERPL-SCNC: 22 MMOL/L (ref 21–32)
CREAT SERPL-MCNC: 1.22 MG/DL (ref 0.5–1.05)
EGFRCR SERPLBLD CKD-EPI 2021: 43 ML/MIN/1.73M*2
ERYTHROCYTE [DISTWIDTH] IN BLOOD BY AUTOMATED COUNT: 12.3 % (ref 11.5–14.5)
GLUCOSE SERPL-MCNC: 96 MG/DL (ref 74–99)
HCT VFR BLD AUTO: 41.1 % (ref 36–46)
HGB BLD-MCNC: 14.4 G/DL (ref 12–16)
MAGNESIUM SERPL-MCNC: 2.08 MG/DL (ref 1.6–2.4)
MCH RBC QN AUTO: 33.1 PG (ref 26–34)
MCHC RBC AUTO-ENTMCNC: 35 G/DL (ref 32–36)
MCV RBC AUTO: 95 FL (ref 80–100)
NRBC BLD-RTO: 0 /100 WBCS (ref 0–0)
PHOSPHATE SERPL-MCNC: 4.1 MG/DL (ref 2.5–4.9)
PLATELET # BLD AUTO: 257 X10*3/UL (ref 150–450)
POTASSIUM SERPL-SCNC: 3.3 MMOL/L (ref 3.5–5.3)
RBC # BLD AUTO: 4.35 X10*6/UL (ref 4–5.2)
SODIUM SERPL-SCNC: 137 MMOL/L (ref 136–145)
WBC # BLD AUTO: 7.6 X10*3/UL (ref 4.4–11.3)

## 2025-01-14 PROCEDURE — 85027 COMPLETE CBC AUTOMATED: CPT

## 2025-01-14 PROCEDURE — 1100000001 HC PRIVATE ROOM DAILY

## 2025-01-14 PROCEDURE — 2500000004 HC RX 250 GENERAL PHARMACY W/ HCPCS (ALT 636 FOR OP/ED)

## 2025-01-14 PROCEDURE — 99233 SBSQ HOSP IP/OBS HIGH 50: CPT

## 2025-01-14 PROCEDURE — 2500000001 HC RX 250 WO HCPCS SELF ADMINISTERED DRUGS (ALT 637 FOR MEDICARE OP)

## 2025-01-14 PROCEDURE — 97530 THERAPEUTIC ACTIVITIES: CPT | Mod: GO

## 2025-01-14 PROCEDURE — 97530 THERAPEUTIC ACTIVITIES: CPT | Mod: GP | Performed by: STUDENT IN AN ORGANIZED HEALTH CARE EDUCATION/TRAINING PROGRAM

## 2025-01-14 PROCEDURE — 80069 RENAL FUNCTION PANEL: CPT

## 2025-01-14 PROCEDURE — 97165 OT EVAL LOW COMPLEX 30 MIN: CPT | Mod: GO

## 2025-01-14 PROCEDURE — 2500000002 HC RX 250 W HCPCS SELF ADMINISTERED DRUGS (ALT 637 FOR MEDICARE OP, ALT 636 FOR OP/ED)

## 2025-01-14 PROCEDURE — 83735 ASSAY OF MAGNESIUM: CPT

## 2025-01-14 PROCEDURE — 97110 THERAPEUTIC EXERCISES: CPT | Mod: GP | Performed by: STUDENT IN AN ORGANIZED HEALTH CARE EDUCATION/TRAINING PROGRAM

## 2025-01-14 PROCEDURE — 99232 SBSQ HOSP IP/OBS MODERATE 35: CPT | Performed by: SURGERY

## 2025-01-14 PROCEDURE — 36415 COLL VENOUS BLD VENIPUNCTURE: CPT

## 2025-01-14 RX ORDER — POLYETHYLENE GLYCOL 3350 17 G/17G
17 POWDER, FOR SOLUTION ORAL 2 TIMES DAILY
Status: DISCONTINUED | OUTPATIENT
Start: 2025-01-14 | End: 2025-01-16 | Stop reason: HOSPADM

## 2025-01-14 RX ORDER — POTASSIUM CHLORIDE 14.9 MG/ML
20 INJECTION INTRAVENOUS ONCE
Status: COMPLETED | OUTPATIENT
Start: 2025-01-14 | End: 2025-01-14

## 2025-01-14 RX ORDER — POTASSIUM CHLORIDE 1.5 G/1.58G
20 POWDER, FOR SOLUTION ORAL ONCE
Status: COMPLETED | OUTPATIENT
Start: 2025-01-14 | End: 2025-01-14

## 2025-01-14 RX ADMIN — AMLODIPINE BESYLATE 5 MG: 5 TABLET ORAL at 08:22

## 2025-01-14 RX ADMIN — LEVOTHYROXINE SODIUM 75 MCG: 75 TABLET ORAL at 08:21

## 2025-01-14 RX ADMIN — PANTOPRAZOLE SODIUM 40 MG: 40 TABLET, DELAYED RELEASE ORAL at 06:09

## 2025-01-14 RX ADMIN — POTASSIUM CHLORIDE 20 MEQ: 1.5 POWDER, FOR SOLUTION ORAL at 10:57

## 2025-01-14 RX ADMIN — ENOXAPARIN SODIUM 30 MG: 100 INJECTION SUBCUTANEOUS at 08:22

## 2025-01-14 RX ADMIN — SIMVASTATIN 20 MG: 20 TABLET, FILM COATED ORAL at 08:21

## 2025-01-14 RX ADMIN — POLYETHYLENE GLYCOL 3350 17 G: 17 POWDER, FOR SOLUTION ORAL at 09:00

## 2025-01-14 RX ADMIN — POTASSIUM CHLORIDE 20 MEQ: 14.9 INJECTION, SOLUTION INTRAVENOUS at 11:04

## 2025-01-14 RX ADMIN — ENOXAPARIN SODIUM 30 MG: 100 INJECTION SUBCUTANEOUS at 20:12

## 2025-01-14 RX ADMIN — CITALOPRAM HYDROBROMIDE 20 MG: 20 TABLET ORAL at 08:21

## 2025-01-14 RX ADMIN — ACETAMINOPHEN 975 MG: 325 TABLET ORAL at 11:04

## 2025-01-14 RX ADMIN — DONEPEZIL HYDROCHLORIDE 10 MG: 10 TABLET, FILM COATED ORAL at 20:13

## 2025-01-14 RX ADMIN — ACETAMINOPHEN 975 MG: 325 TABLET ORAL at 20:12

## 2025-01-14 RX ADMIN — METOPROLOL TARTRATE 12.5 MG: 25 TABLET, FILM COATED ORAL at 08:22

## 2025-01-14 RX ADMIN — METOPROLOL TARTRATE 12.5 MG: 25 TABLET, FILM COATED ORAL at 20:14

## 2025-01-14 ASSESSMENT — COGNITIVE AND FUNCTIONAL STATUS - GENERAL
MOVING FROM LYING ON BACK TO SITTING ON SIDE OF FLAT BED WITH BEDRAILS: A LITTLE
CLIMB 3 TO 5 STEPS WITH RAILING: A LITTLE
MOVING FROM LYING ON BACK TO SITTING ON SIDE OF FLAT BED WITH BEDRAILS: A LITTLE
TURNING FROM BACK TO SIDE WHILE IN FLAT BAD: A LITTLE
MOBILITY SCORE: 17
DRESSING REGULAR LOWER BODY CLOTHING: A LOT
DRESSING REGULAR UPPER BODY CLOTHING: A LITTLE
TOILETING: A LITTLE
MOVING FROM LYING ON BACK TO SITTING ON SIDE OF FLAT BED WITH BEDRAILS: A LITTLE
HELP NEEDED FOR BATHING: A LOT
TOILETING: A LOT
EATING MEALS: A LITTLE
DRESSING REGULAR LOWER BODY CLOTHING: A LITTLE
PERSONAL GROOMING: A LITTLE
DRESSING REGULAR UPPER BODY CLOTHING: A LOT
STANDING UP FROM CHAIR USING ARMS: A LITTLE
TOILETING: A LOT
CLIMB 3 TO 5 STEPS WITH RAILING: A LOT
PERSONAL GROOMING: A LITTLE
TURNING FROM BACK TO SIDE WHILE IN FLAT BAD: A LITTLE
PERSONAL GROOMING: A LITTLE
STANDING UP FROM CHAIR USING ARMS: A LITTLE
HELP NEEDED FOR BATHING: A LOT
DAILY ACTIVITIY SCORE: 18
HELP NEEDED FOR BATHING: A LITTLE
DRESSING REGULAR UPPER BODY CLOTHING: A LITTLE
DAILY ACTIVITIY SCORE: 18
MOVING TO AND FROM BED TO CHAIR: A LITTLE
WALKING IN HOSPITAL ROOM: A LITTLE
DAILY ACTIVITIY SCORE: 15
TURNING FROM BACK TO SIDE WHILE IN FLAT BAD: A LITTLE
HELP NEEDED FOR BATHING: A LITTLE
MOVING TO AND FROM BED TO CHAIR: A LITTLE
PERSONAL GROOMING: A LITTLE
DRESSING REGULAR UPPER BODY CLOTHING: A LOT
MOBILITY SCORE: 18
DAILY ACTIVITIY SCORE: 16
EATING MEALS: A LITTLE
TOILETING: A LITTLE
WALKING IN HOSPITAL ROOM: A LITTLE

## 2025-01-14 ASSESSMENT — PAIN SCALES - GENERAL
PAINLEVEL_OUTOF10: 0 - NO PAIN

## 2025-01-14 ASSESSMENT — PAIN - FUNCTIONAL ASSESSMENT
PAIN_FUNCTIONAL_ASSESSMENT: 0-10

## 2025-01-14 ASSESSMENT — ACTIVITIES OF DAILY LIVING (ADL)
BATHING_ASSISTANCE: MAXIMAL
ADL_ASSISTANCE: INDEPENDENT
LACK_OF_TRANSPORTATION: NO

## 2025-01-14 NOTE — PROGRESS NOTES
01/14/25 1509   Discharge Planning   Living Arrangements Children   Support Systems Children;Family members   Assistance Needed Patient requires assistance with ADLs prior to admission   Type of Residence Private residence   Number of Stairs to Enter Residence 2   Number of Stairs Within Residence 0   Do you have animals or pets at home? Yes   Type of Animals or Pets 1 dog 2 cats   Who is requesting discharge planning? Provider   Home or Post Acute Services Post acute facilities (Rehab/SNF/etc)   Type of Post Acute Facility Services Skilled nursing   Expected Discharge Disposition Short Term A   Does the patient need discharge transport arranged? Yes   RoundTrip coordination needed? Yes   Has discharge transport been arranged? No   What day is the transport expected? 01/16/25   Financial Resource Strain   How hard is it for you to pay for the very basics like food, housing, medical care, and heating? Not hard   Housing Stability   In the last 12 months, was there a time when you were not able to pay the mortgage or rent on time? N   In the past 12 months, how many times have you moved where you were living? 0   At any time in the past 12 months, were you homeless or living in a shelter (including now)? N   Transportation Needs   In the past 12 months, has lack of transportation kept you from medical appointments or from getting medications? no   In the past 12 months, has lack of transportation kept you from meetings, work, or from getting things needed for daily living? No   Patient Choice   Provider Choice list and CMS website (https://medicare.gov/care-compare#search) for post-acute Quality and Resource Measure Data were provided and reviewed with: Family   Patient / Family choosing to utilize agency / facility established prior to hospitalization No     Transitional Care Coordinator Note: TCC spoke with patient's daughter Lashawn Turner [phone] introduced self and role to complete assessment (see above)  and discuss discharge planning. Patient confirmed demographics:    Address: [address] [place] [address]. [place] [address]   Alternate/Emergency Contact: Lashawn Turner (daughter/POA) [phone]  Rob Turner (son) [phone]  Patient Contact: N/A  DME: walker, wheelchair, shower chair, grab bars, bedside commode  Homecare: Denies active HC  Oxygen Use: Denies use of oxygen, bipap and cpap  Diabetic: Denies   Dialysis: Denies   Falls:  4 Falls   PCP:  Allyn Solis-last visit Oct 2024  Pharmacy: [place], [place]  Insurance: [employer] Medicare       Patient lives with daughter in ranch style home. Patient requires assistance with ADLS prior to admission provided by daughter, additional family and friends. Patient discussed in morning rounds, per medical team (trauma) patient is medically ready. Discharge dispo: Plan for patient to discharge to [facility] pending pre-cert. TCC discuss IMM with patient's daughter and emailed copy to [email or web address]. Trauma SW [email or web address].     Pascual Morgan RN BSN   Transitional Care Coordinator

## 2025-01-14 NOTE — PROGRESS NOTES
Occupational Therapy    Evaluation/Treatment    Patient Name: Gabrielle Turner  MRN: [id number]  Department: [place] [address]  Room: 8008007-A  Today's Date: 01/14/25  Time Calculation  Start Time: 0901  Stop Time: 0926  Time Calculation (min): 25 min       Assessment:  OT Assessment: Pt will benefit from continued skilled OT to increase independence in ADLs, functional mobility, activity tolerance, safety, strength, and cognition.  Prognosis: Good  Barriers to Discharge Home: Caregiver assistance, Cognition needs, Physical needs  Caregiver Assistance: Caregiver assistance needed per identified barriers - however, level of patient's required assistance exceeds assistance available at home  Cognition Needs: 24hr supervision for safety awareness needed  Physical Needs: 24hr mobility assistance needed, 24hr ADL assistance needed  Evaluation/Treatment Tolerance: Patient tolerated treatment well  Medical Staff Made Aware: Yes  End of Session Communication: Bedside nurse  End of Session Patient Position: Bed, 3 rail up, Alarm on  OT Assessment Results: Decreased ADL status, Decreased upper extremity strength, Decreased safe judgment during ADL, Decreased endurance, Decreased functional mobility, Decreased IADLs  Prognosis: Good  Evaluation/Treatment Tolerance: Patient tolerated treatment well  Medical Staff Made Aware: Yes  Strengths: Attitude of self  Barriers to Participation: Comorbidities  Plan:  Treatment Interventions: ADL retraining, Functional transfer training, UE strengthening/ROM, Endurance training, Cognitive reorientation, Equipment evaluation/education, Patient/family training, Compensatory technique education  OT Frequency: 3 times per week  OT Discharge Recommendations: Moderate intensity level of continued care  Equipment Recommended upon Discharge:  (TBD at next level of care)  OT Recommended Transfer Status: Assist of 1  OT - OK to Discharge: Yes (upon medical clearance)  Treatment Interventions: ADL retraining,  "Functional transfer training, UE strengthening/ROM, Endurance training, Cognitive reorientation, Equipment evaluation/education, Patient/family training, Compensatory technique education    Subjective   Current Problem:  1. SDH (subdural hematoma) (Multi)  EEG        General:   OT Received On: 01/14/25  General  Reason for Referral: Acute SDH, T9 Inferior Endplate Deformity  Past Medical History Relevant to Rehab: Dementia, HTN, HLD, Hypothyroidism , GERD, PVD  Family/Caregiver Present: No  Prior to Session Communication: Bedside nurse  Patient Position Received: Bed, 3 rail up, Alarm on  General Comment: Pt supine in bed upon arrival, agreeable to OT   Precautions:  LE Weight Bearing Status: Weight Bearing as Tolerated  Medical Precautions: Fall precautions    Pain:  Pain Assessment  Pain Assessment: 0-10  0-10 (Numeric) Pain Score: 0 - No pain    Objective   Cognition:  Overall Cognitive Status: Impaired  Orientation Level: Disoriented to situation, Disoriented to time, Disoriented to place  Cognition Comments: Pt repeating self throughout session reporting \"I'm just concerned about the baby at home\", confused and required VCs for safety throughout session  Attention: Exceptions to WFL  Insight: Mild  Impulsive: Mildly  Processing Speed: Delayed     Home Living:  Type of Home: House  Lives With: Adult children (dtr)  Home Adaptive Equipment: Walker rolling or standard  Home Layout: One level  Home Access: Stairs to enter without rails  Entrance Stairs-Rails: None  Entrance Stairs-Number of Steps: 2  Home Living Comments: unable to assess, pt is ? historian, obtained through chart review  Prior Function:  Level of Catarina: Independent with ADLs and functional transfers  ADL Assistance: Independent  Ambulatory Assistance: Independent  Prior Function Comments: obtained per chart review, pt is ? historian    ADL:  Eating Assistance: Stand by (anticipated)  Grooming Assistance: Stand by (anticipated)  Bathing " Assistance: Maximal (anticipated seated)  UE Dressing Assistance: Moderate (gown manegement)  LE Dressing Assistance: Maximal (anticipated)  Toileting Assistance with Device: Maximal (anticipated)    Activity Tolerance:  Endurance: Tolerates 10 - 20 min exercise with multiple rests    Bed Mobility/Transfers: Bed Mobility  Bed Mobility: Yes  Bed Mobility 1  Bed Mobility 1: Supine to sitting, Sitting to supine  Level of Assistance 1: Moderate assistance, Minimal verbal cues  Bed Mobility Comments 1: HOB elevated    Transfers  Transfer: Yes  Transfer 1  Transfer From 1: Sit to, Stand to  Transfer to 1: Stand, Sit  Technique 1: Sit to stand, Stand to sit  Transfer Device 1: Walker  Transfer Level of Assistance 1: Moderate verbal cues, Moderate tactile cues, Minimum assistance  Trials/Comments 1: VCs for hand placement    Functional Mobility:  Functional Mobility  Functional Mobility Performed: Yes  Functional Mobility 1  Surface 1: Level tile  Device 1: Rolling walker  Assistance 1: Minimum assistance, Minimal verbal cues  Comments 1: L lateral steps toward HOB min A FWW  Sitting Balance:  Static Sitting Balance  Static Sitting-Balance Support: Feet supported  Static Sitting-Level of Assistance: Close supervision  Dynamic Sitting Balance  Dynamic Sitting-Balance Support: Feet supported  Dynamic Sitting-Level of Assistance: Close supervision  Standing Balance:  Static Standing Balance  Static Standing-Balance Support: Bilateral upper extremity supported  Static Standing-Level of Assistance: Minimum assistance  Dynamic Standing Balance  Dynamic Standing-Balance Support: Bilateral upper extremity supported  Dynamic Standing-Level of Assistance: Moderate assistance    Modalities:  Modalities Used: No    Therapy/Activity:      Therapeutic Activity  Therapeutic Activity Performed: Yes  Therapeutic Activity 1: bed mob, transfers, fxnl mob, extended time seated EOB/ static stand 2/2 full bed change in  session    Sensation:  Light Touch: No apparent deficits  Strength:  Strength Comments: BUE at least 3/5 grossly, observed through functional observation    Perception:  Inattention/Neglect: Appears intact  Coordination:  Movements are Fluid and Coordinated: No   Hand Function:  Hand Function  Gross Grasp: Functional  Coordination: Functional    Outcome Measures: [facility] Daily Activity  Putting on and taking off regular lower body clothing: A lot  Bathing (including washing, rinsing, drying): A lot  Putting on and taking off regular upper body clothing: A little  Toileting, which includes using toilet, bedpan or urinal: A lot  Taking care of personal grooming such as brushing teeth: A little  Eating Meals: A little  Daily Activity - Total Score: 15     and OT Adult Other Outcome Measures  4AT: positive    Education Documentation  Body Mechanics, taught by Aris Carlos OT at 1/14/2025 11:56 AM.  Learner: Patient  Readiness: Acceptance  Method: Explanation  Response: Verbalizes Understanding, Needs Reinforcement    Precautions, taught by Aris Carlos OT at 1/14/2025 11:56 AM.  Learner: Patient  Readiness: Acceptance  Method: Explanation  Response: Verbalizes Understanding, Needs Reinforcement    ADL Training, taught by Aris Carlos OT at 1/14/2025 11:56 AM.  Learner: Patient  Readiness: Acceptance  Method: Explanation  Response: Verbalizes Understanding, Needs Reinforcement    Education Comments  No comments found.      Goals:  Encounter Problems       Encounter Problems (Active)       ADLs       Patient with complete upper body dressing with set-up level of assistance donning and doffing all UE clothes with PRN adaptive equipment while edge of bed  (Progressing)       Start:  01/14/25    Expected End:  02/04/25            Patient with complete lower body dressing with moderate assist level of assistance donning and doffing all LE clothes  with PRN adaptive equipment while supported sitting and standing  (Progressing)       Start:  01/14/25    Expected End:  02/04/25            Patient will complete toileting including hygiene clothing management/hygiene with moderate assist level of assistance and bedside commode. (Progressing)       Start:  01/14/25    Expected End:  02/04/25               BALANCE       Pt will maintain dynamic standing balance during ADL task with modified independent level of assistance in order to demonstrate decreased risk of falling and improved postural control. (Progressing)       Start:  01/14/25    Expected End:  02/04/25               COGNITION/SAFETY       Patient will demonstrated orientation x 2 with verbal cues. (Progressing)       Start:  01/14/25    Expected End:  02/04/25       ORIENTATION            MOBILITY       Patient will perform Functional mobility mod  Household distances/Community Distances with stand by assist level of assistance and least restrictive device in order to improve safety and functional mobility. (Progressing)       Start:  01/14/25    Expected End:  02/04/25               TRANSFERS       Patient will complete sit to stand transfer with modified independent level of assistance and least restrictive device in order to improve safety and prepare for out of bed mobility. (Progressing)       Start:  01/14/25    Expected End:  02/04/25               Aris Carlos OTR/L

## 2025-01-14 NOTE — PROGRESS NOTES
LSW spoke with the patient's daughter and caregiver for the past 2-years. The daughter shared that she can no longer care for her mother due to her own slow progression of ALS. The daughter indicated that she would like the patient to be referred to 1. [place], 2. [facility], and 3. [facility]. Referrals were sent.

## 2025-01-14 NOTE — PROGRESS NOTES
[facility]  TRAUMA SERVICE - PROGRESS NOTE    Patient Name: Gabrielle Turner  MRN: [id number]  Admit Date: 111  : 1935  AGE: 89 y.o.   GENDER: female  ==============================================================================  Pt is an 89 YOF who arrived as a transfer after being found on her hands and knees at home with presumed unwitnessed fall. Per patient family she has a history of dementia and at best is alert to self only. Trauma scans at outside hospital revealed an acute on chronic SDH and T9 compression deformity. Patient was transferred to [facility] for repeat scan and and NSGY consult.  LOC (yes/no?): Unknown  Anticoagulant / Anti-platelet Rx? (for what dx?): None  Referring Facility Name (N/A for scene EMR run): [facility]     INJURIES:   SDH Acute on Chronic  T9 Inferior Endplate Deformity     OTHER MEDICAL PROBLEMS:  Dementia  HTN  HLD  Hypothyroidism  GERD  PVD     INCIDENTAL FINDINGS:  Small hiatal hernia  Scattered colonic diverticula   4 mm LLL nodule    PROCEDURES:  Non-op    ==============================================================================  TODAY'S ASSESSMENT AND PLAN OF CARE:  ##SDH  - Neurosurgery consulted  - [facility] stable, no further imaging need at this time  - q4h neuro checks  - EEG negative for epileptiform changes  - will consider MMA embolization if family is amenable        ##T9 Compression Deformity  - Ortho spine consulted awaiting recs: upright T-spine when able. Will follow up in ortho clinic in 2wks     #Comorbidities  -Geriatrics:   -decrease celexa  -dc docusate, start senna  -stop doxbutynin, to resume tolterodine at DC  -increase tylenol to 975 TID  -TSH, Vit D, B12  -AM labs  Nutrition:   -continue regular diet, gelatein plus supplement BID  -DVT Prophy: lovenox and SCDs    Dispo: RNF for now, will need SNF with wheeled walker on DC    Patient was seen with Dr. Guzman Valenzuela PGY1  Trauma  Surgery  ==============================================================================  CHIEF COMPLAINT / OVERNIGHT EVENTS:   NAEON.  Pain well controlled  No n/v, no flatus or BM.    MEDICAL HISTORY / ROS:  Admission history and ROS reviewed. Pertinent changes as follows:  As mentioned above    PHYSICAL EXAM:  Heart Rate:  [59-81]   Temp:  [36.1 °C (97 °F)-36.6 °C (97.9 °F)]   Resp:  [14-16]   BP: (108-158)/(65-97)   Height:  [152.4 cm (5')]   Weight:  [51.7 kg (114 lb)]   SpO2:  [95 %-100 %]   Physical Exam  HENT:      Head: Normocephalic.      Right Ear: External ear normal.      Left Ear: External ear normal.      Nose: Nose normal.   Eyes:      General:         Right eye: No discharge.         Left eye: No discharge.   Cardiovascular:      Rate and Rhythm: Normal rate.   Pulmonary:      Effort: Pulmonary effort is normal.   Abdominal:      General: There is no distension.      Palpations: Abdomen is soft.   Musculoskeletal:         General: Normal range of motion.      Cervical back: Normal range of motion.   Skin:     General: Skin is warm and dry.   Neurological:      Mental Status: She is alert. Mental status is at baseline.           IMAGING SUMMARY:  (summary of new imaging findings, not a copy of dictation)  CT Head/Face: Similar appearance of a mixed density collection over the left cerebral convexity measuring up to 2.0 cm in thickness. This likely represents an acute on chronic subdural hematoma. Mild underlying cortical sulcal effacement and rightward midline shift, similar to prior.   CT C-Spine: No acute fracture or traumatic subluxation of the cervical spine.   CT Chest/Abd/Pelvis: No acute abnormality of the chest, abdomen and pelvis. There is compression deformity along the inferior endplate of the T9   vertebral body with approximate 25% loss of vertebral body height. No acute fracture or traumatic subluxation of the lumbar spine.   CXR/PXR: Probable chronic lung changes.     LABS:  Results  from last 7 days   Lab Units 01/11/25  2317 01/11/25  1502   WBC AUTO x10*3/uL 8.8 7.1   HEMOGLOBIN g/dL 14.1 13.7   HEMATOCRIT % 38.6 39.4   PLATELETS AUTO x10*3/uL 253 237   NEUTROS PCT AUTO % 73.9 68.4   LYMPHS PCT AUTO % 13.6 18.8   MONOS PCT AUTO % 10.6 10.2   EOS PCT AUTO % 0.7 1.7     Results from last 7 days   Lab Units 01/11/25 2317   APTT seconds 31   INR  1.2*     Results from last 7 days   Lab Units 01/11/25  2317 01/11/25  1502   SODIUM mmol/L 139 139   POTASSIUM mmol/L 3.7 3.5   CHLORIDE mmol/L 105 106   CO2 mmol/L 20* 24   BUN mg/dL 18 21   CREATININE mg/dL 0.85 0.96   CALCIUM mg/dL 9.8 9.3   PROTEIN TOTAL g/dL 7.5 6.7   BILIRUBIN TOTAL mg/dL 0.8 0.4   ALK PHOS U/L 76 78   ALT U/L 8 8   AST U/L 15 13   GLUCOSE mg/dL 102* 105*     Results from last 7 days   Lab Units 01/11/25 2317 01/11/25  1502   BILIRUBIN TOTAL mg/dL 0.8 0.4           I have reviewed all medications, laboratory results, and imaging pertinent for today's encounter.

## 2025-01-14 NOTE — PROGRESS NOTES
Physical Therapy    Physical Therapy Treatment    Patient Name: Gabrielle Turner  MRN: [id number]  Today's Date: 1/14/2025  Room: [address] [place] [address]  Time Calculation  Start Time: 1040  Stop Time: 1105  Time Calculation (min): 25 min       Assessment/Plan   PT Plan  Treatment/Interventions: Bed mobility, Transfer training, Gait training, Stair training, Balance training, Neuromuscular re-education, Strengthening, Endurance training, Therapeutic exercise, Therapeutic activity, Home exercise program  PT Plan: Ongoing PT  PT Frequency: 3 times per week  Equipment Recommended upon Discharge:  (owns necessary equipment)  PT Recommended Transfer Status: Assist x1, Assistive device  PT - OK to Discharge: Yes    General Visit Information:   Reason for Referral: Acute SDH, T9 Inferior Endplate Deformity  Past Medical History Relevant to Rehab: Dementia, HTN, HLD, Hypothyroidism , GERD, PVD  Prior to Session Communication: Bedside nurse  Patient Position Received: Bed, 3 rail up, Alarm on   Subjective: Patient is alert, agreeable to PT.  Asking why no family have come to visit her.  Oriented to self only.    Precautions:  Precautions  LE Weight Bearing Status: Weight Bearing as Tolerated  Medical Precautions: Fall precautions, Cardiac precautions  Vital Signs:  Vital Signs (Past 2hrs)        Date/Time Vitals Session Patient Position Pulse Resp SpO2 BP MAP (mmHg)    01/14/25 1108 --  --  59  18  98 %  124/73  --                     Objective   Pain:  Pain Assessment  0-10 (Numeric) Pain Score: 0 - No pain (post 0)  Cognition:  Cognition  Overall Cognitive Status: Impaired  Lines/Tubes/Drains:  External Urinary Catheter (Active)   Number of days: 2     Medical Gas Therapy: None (Room air)  Continuous Medications/Drips:       PT Treatments:           Bed Mobility 1  Bed Mobility 1: Supine to sitting  Level of Assistance 1: Minimum assistance  Bed Mobility Comments 1: increased verbal cues  Bed Mobility 2  Bed Mobility  2: Sitting to  supine  Level of Assistance 2: Contact guard  Bed Mobility Comments 2: verbal cues  Ambulation/Gait Training 1  Surface 1: Level tile  Device 1: Rolling walker  Assistance 1: Minimum assistance, Minimal verbal cues  Quality of Gait 1:  (cues for directional movement, intermittent pausing)  Comments/Distance (ft) 1: 30' x 2  Transfer 1  Transfer From 1: Sit to  Transfer to 1: Stand  Transfer Device 1: Walker  Transfer Level of Assistance 1: Minimum assistance  Trials/Comments 1: x4  Transfers 2  Transfer From 2: Stand to  Transfer to 2: Sit  Transfer Device 2: Walker  Transfer Level of Assistance 2: Minimum assistance  Trials/Comments 2: x4  Transfers 3  Transfer From 3: Bed to  Transfer to 3: Bed  Transfer Device 3: Walker  Transfer Level of Assistance 3: Minimum assistance  Trials/Comments 3: x2             Outcome Measures:  [facility] Basic Mobility  Turning from your back to your side while in a flat bed without using bedrails: A little  Moving from lying on your back to sitting on the side of a flat bed without using bedrails: A little  Moving to and from bed to chair (including a wheelchair): A little  Standing up from a chair using your arms (e.g. wheelchair or bedside chair): A little  To walk in hospital room: A little  Climbing 3-5 steps with railing: A lot  Basic Mobility - Total Score: 17                            Education Documentation  Precautions, taught by Nelson Reed PT at 1/14/2025 11:20 AM.  Learner: Patient  Readiness: Acceptance  Method: Explanation  Response: Needs Reinforcement  Comment: POC review    Body Mechanics, taught by Nelson Reed PT at 1/14/2025 11:20 AM.  Learner: Patient  Readiness: Acceptance  Method: Explanation  Response: Needs Reinforcement  Comment: POC review    Mobility Training, taught by Nelson Reed PT at 1/14/2025 11:20 AM.  Learner: Patient  Readiness: Acceptance  Method: Explanation  Response: Needs Reinforcement  Comment: POC review    Education  Comments  No comments found.          OP EDUCATION:       Encounter Problems       Encounter Problems (Active)       PT Problem       Patient will complete supine to sit and sit to supine Supervision  (Progressing)       Start:  01/13/25    Expected End:  01/27/25            Patient will perform sit<>stand transfer with LRAD, and Supervision  (Progressing)       Start:  01/13/25    Expected End:  01/27/25            Patient will ambulate >100' with LRAD and Supervision  (Progressing)       Start:  01/13/25    Expected End:  01/27/25            Patient will ascend/descend 3 steps with Right Rail Ascending and supervision  (Progressing)       Start:  01/13/25    Expected End:  01/27/25               Pain - Adult              Assessment: Patient is progressing Well with therapy this date.  Patient able to complete increased functional activities this date with transfer, balance, and gait trials.  Remains high falls risk 2/2 cognition, balance, and gait impairments.   Patient remains appropriate for MOD intensity therapy when medically appropriate for discharge from acute stay.  Will continue to follow.      01/14/25 at 11:21 AM   Nelson Reed, PT   Rehab Office: [phone]

## 2025-01-14 NOTE — PROGRESS NOTES
Late entry for 01.12.25    Trauma Consult from the ED    88 yo female fall with ICH.   No complaints.   Sitter present.   History of dementia.   On exam, chronically ill appearing. Alert. RIVAS. Abd is soft, NT, ND.   Imaging shows:  There is compression deformity along the inferior endplate of the T9  vertebral body with approximate 25% loss of vertebral body height.  This is age indeterminate but new from CT scan of the chest dated  10/18/2022. Correlate with symptomatology at this level for the need  for further evaluation  Similar appearance of a mixed density collection over the left  cerebral convexity measuring up to 2.0 cm in thickness. This likely  represents an acute on chronic subdural hematoma. Mild underlying  cortical sulcal effacement and rightward midline shift, similar to  prior.  Injuries include:  -ICH: NSGY evaluation. Neuro monitoring.   -Falls and dementia: Geriatrics evaluation. PT. Clarify code status. Sitter.   -T9 indeterminate fracture: Spine service consult.

## 2025-01-14 NOTE — PROGRESS NOTES
Gabrielle Turner is a 89 y.o. female on day 2 of admission presenting with SDH (subdural hematoma) (Multi).    Subjective   No acute events overnight       Objective     Physical Exam  Neuro: Awake, minimally interactive  Ox1  names 1/3, repetition intact  Follows commands greater than antigravity symmetrically    Last Recorded Vitals  Blood pressure 120/80, pulse 69, temperature 37.2 °C (99 °F), temperature source Temporal, resp. rate 18, height 1.524 m (5'), weight 51.7 kg (114 lb), SpO2 96%.  Intake/Output last 3 Shifts:  No intake/output data recorded.              Assessment/Plan   Assessment & Plan  SDH (subdural hematoma) (Multi)    89yF h/o HTN, HLD, hypothyroidism, alzheimer's, GERD, prior T9 compression fx, anxiety, p/w aphasia, lethargy for 5d, fall yesterday, UTI positive on home kit and started on bactrim 2d ago, CTH 1.8cm mixed density L convexity SDH, rCTH stable. EEG neg, dc'd    Plan:  Trauma primary  MMA planning pending family discussion  Will need follow up           Capri Dickerson MD

## 2025-01-14 NOTE — PROGRESS NOTES
Order placed for repeat CTH to reassess L SDH, to be completed prior to 2 week outpatient neurosurgery follow up visit.

## 2025-01-14 NOTE — PROGRESS NOTES
Subjective   No acute events overnight. Neurosurgery planning possible MMA pending discussion with family. PT evaluation recommending mod intensity.  Ms. Turner says that she loves dogs, no complaints today.         Objective     Current Facility-Administered Medications   Medication Dose Route Frequency Provider Last Rate Last Admin    acetaminophen (Tylenol) tablet 975 mg  975 mg oral q8h Edyta Valenzuela MD        amLODIPine (Norvasc) tablet 5 mg  5 mg oral Daily Ahmad A Cee, APRN-CNP   5 mg at 01/14/25 0822    citalopram (CeleXA) tablet 20 mg  20 mg oral Daily Edyta Valenzuela MD   20 mg at 01/14/25 0821    donepezil (Aricept) tablet 10 mg  10 mg oral Nightly Ahmad A Cee, APRN-CNP   10 mg at 01/12/25 2205    enoxaparin (Lovenox) syringe 30 mg  30 mg subcutaneous BID Edyta Valenzuela MD   30 mg at 01/14/25 0822    levothyroxine (Synthroid, Levoxyl) tablet 75 mcg  75 mcg oral Daily Ahmad A Cee, APRN-CNP   75 mcg at 01/14/25 0821    metoprolol tartrate (Lopressor) tablet 12.5 mg  12.5 mg oral BID Ahmad A Cee, APRN-CNP   12.5 mg at 01/14/25 0822    oxygen (O2) therapy   inhalation Continuous PRN - O2/gases Ahmad A Ece, APRN-CNP        pantoprazole (ProtoNix) EC tablet 40 mg  40 mg oral Daily before breakfast Ahmad A Cee, APRN-CNP   40 mg at 01/14/25 0609    polyethylene glycol (Glycolax, Miralax) packet 17 g  17 g oral Daily Ahmad A Cee, APRN-CNP   17 g at 01/13/25 1205    sennosides (Senokot) tablet 8.6 mg  1 tablet oral Nightly Edyta Valenzuela MD        simvastatin (Zocor) tablet 20 mg  20 mg oral Daily Ahmad A Cee, APRN-CNP   20 mg at 01/14/25 0821       Physical Exam  Constitutional:       Appearance: Normal appearance.   HENT:      Head: Normocephalic.      Nose: Nose normal.      Mouth/Throat:      Mouth: Mucous membranes are moist.   Eyes:      Extraocular Movements: Extraocular movements intact.      Pupils: Pupils are equal,  round, and reactive to light.   Cardiovascular:      Rate and Rhythm: Normal rate and regular rhythm.      Pulses: Normal pulses.   Pulmonary:      Effort: Pulmonary effort is normal.      Breath sounds: Normal breath sounds.   Abdominal:      General: Abdomen is flat. There is no distension.      Palpations: Abdomen is soft.      Tenderness: There is no abdominal tenderness.   Musculoskeletal:      Right lower leg: No edema.      Left lower leg: No edema.   Skin:     Findings: No rash.   Neurological:      Mental Status: She is alert.      Comments: Oriented to self only.         Confusion Assessment Method(CAM) for diagnosis of delirium:    1.  Acute onset or fluctuating course: absent  2.  Inattention: present  3.  Disorganized thinking: absent  4.  Altered level of consciousness: absent  CAM: negative    AT Score For Assessment of Delirium and Cognitive Impairment:    Alertness: 0  Normal(fully alert,but not agitated, throughout assessment)=0  Mild sleepiness for <10 seconds after walking, then normal=0  Clearly abnormal=4  2.  AMT4: 2  No mistakes=0  One mistake=1  Two or more mistakes/untestable=2  3.  Attention: 2  Achieves seven months or more correctly=0  Starts but scores <7 months/ refuses to start=1  Untestable(cannot start because unwell, drowsy, inattentive)=2  4.  Acute: 0  No=0  Yes=4    Total Score: 4  4 or above: Possible delirium +/- cognitive impairment  1-3: Possible cognitive impairment  0: Delirium or severe cognitive impairment unlikely(but delirium still possible if (4) information incomplete)      Last Recorded Vitals      1/13/2025     6:00 AM 1/13/2025     9:00 AM 1/13/2025     5:00 PM 1/13/2025     8:02 PM 1/13/2025    11:11 PM 1/14/2025     3:58 AM 1/14/2025     8:21 AM   Vitals   Systolic  122 108 92 110 120 107   Diastolic  86 65 58 71 80 72   BP Location  Right arm Right arm Right arm Right arm Right arm Right arm   Heart Rate  76 81 90 70 69 70   Temp -- 36.6 °C (97.9 °F) 36.2 °C  (97.1 °F) 37.2 °C (99 °F)   35.8 °C (96.4 °F)   Resp  14 16 17 18 18 17      Vitals:    01/12/25 2232   Weight: 51.7 kg (114 lb)        Relevant Results  Lab Results   Component Value Date    TSH 14.03 (H) 01/13/2025    WOTQJWEQ45 331 01/13/2025    VITD25 85 01/13/2025     Results from last 7 days   Lab Units 01/13/25  2018 01/11/25  2317 01/11/25  1502   WBC AUTO x10*3/uL 8.4 8.8 7.1   HEMOGLOBIN g/dL 15.2 14.1 13.7   HEMATOCRIT % 44.0 38.6 39.4   ALT U/L  --  8 8   AST U/L  --  15 13   SODIUM mmol/L 136 139 139   POTASSIUM mmol/L 3.8 3.7 3.5   CHLORIDE mmol/L 102 105 106   CREATININE mg/dL 1.41* 0.85 0.96   BUN mg/dL 26* 18 21   CO2 mmol/L 21 20* 24   INR   --  1.2*  --           XR thoracic spine 2 views, XR lumbar spine 2-3 views  Narrative: STUDY:  Thoracic spine, 2 views.  Lumbar spine, 2 views.      INDICATION:  Signs/Symptoms:standing uprights when able.      COMPARISON:  CT of the thoracic and lumbar spine 01/11/2025..      ACCESSION NUMBER(S):  [id number]; [id number]      ORDERING CLINICIAN:  SUNITA KHAN      FINDINGS:  THORACIC SPINE:  Mild S- shaped curvature of the thoracolumbar spine.  There is again mild compression deformity involving the inferior  endplate of the T9 vertebral body, better characterized on CT of the  thoracic spine from 01/11/2025. Vertebral body heights are otherwise  well maintained. Posterior elements appear to be intact.      LUMBAR SPINE:  Mild S shaped curvature of the thoracolumbar spine.  There is grade 1 anterolisthesis of L3 on L4 and L4 on L5.  Vertebral body heights are well-maintained. Posterior elements appear  to be intact. Moderate facet/degenerative changes are present.      Impression: 1. Mild compression deformity involving the inferior endplate of the  T9 vertebral body, better characterized on CT of the thoracic spine  from 01/11/2025.  2. No evidence of acute compression deformity of the lumbar spine  with grade 1 anterolisthesis of L3 on L4 and L4  on L5.  3. S-shaped curvature of the thoracolumbar spine.  4. Please see dedicated imaging of the thoracic and lumbar spine for  further characterization of the degenerative changes. \      I personally reviewed the images/study and I agree with the findings  as stated by resident Aman Morgan. This study was interpreted  at [place], [place].      MACRO:  None.          Dictation workstation:   [id number]          DATA:  EKG: QTC  Encounter Date: 01/11/25   ECG 12 lead   Result Value    Ventricular Rate 63    Atrial Rate 63    FL Interval 175    QRS Duration 86    QT Interval 468    QTC Calculation(Bazett) 480    P Axis 67    R Axis 44    T Axis 64    QRS Count 11    Q Onset 249    T Offset 483    QTC Fredericia 475    Narrative    Sinus rhythm  Atrial premature complex  Left atrial enlargement      Anti-psychotics in 48 hours: none  Opioids/Benzodiazepines in 48 hours: none  Anticholinergics on board:No  Restraints:No  Indwelling catheters:No  Last BM: none  UO in 24 hours: 1 count  Activity in the past 24 hours: up with PT today  Need for ambulatory devices: walker                  Assessment/Plan   Gabrielle Turner is a 89 y.o. female on day 2 of admission presenting with SDH (subdural hematoma) (Multi). osteoporosis, HTN, HLD, hypothyroidism, GERD, BPPV, PVD, UTI, and dementia, presenting for unwitnessed fall and found to have a SDH, being seen in geriatric consultation for advanced dementia and multiple co-morbidities. She does not have delirium at this point but with her underlying cognitive impairment she is at high risk of delirium in the hospital so we will continue to follow to assist with delirium prevention.    Principal Problem:    SDH (subdural hematoma) (Multi)    1. Fall with fragility fractures and history of osteoporosis  Outpatient follow up with endocrinology (please place referral in outpatient orders) for osteoporosis management  Vitamin D  level is appropriate 85  Agree with scheduled tylenol for pain avoiding narcotics if possible  Trauma primary and ortho following    2. Acute on chronic sdh  Neurosurgery offered MMA which was declined by family     3. Severe dementia (deficits in both IADLs and ADLs)  Ceiba 15/30 most recent with orientation to self only at baseline   Plan is discharge to SNF, family thinking about memory care afterward as they are having increasing difficulty caring for her at home   Please place geriatrics referral in the outpatient orders and copy the following into discharge instructions:  Referral to Geriatric Outpatient clinic: [facility], [address] [place] [address], [address], [phone]  1:1 for feeding may be required for pleasure feeds given her underlying severe dementia    4. Hypothyroidism:   - admission TSH 6.26, repeat 2 days later 14  - continue home levothyroxine, would have the SNF repeat TSH in 6-8 weeks for ongoing monitoring     5. Polypharmacy:   - on discharge med rec please: discontinue docusate in favor of senna, discontinue ativan    6. Hypertension:   - on discharge med rec please decrease dose of amlodipine to 2.5mg as hospital Bps have been 98//73 and we would want to air on the side of minimizing risk of hypotension in this patient who is older with osteoporosis and prone to falls    7. Depression:   - on discharge med rec please continue reduced dose of citalopram 20mg (max dose in older adults)    8. Urinary incontinence:   - resume tolterodine on discharge  - continue to hold oxybutynin inpatient    Care Transitions:  - Recommended level for discharge: placement to SNF  - Home going considerations:   - Primary care physician: Allyn Solis MD     Goals of Care:  - Health care power of : DaughterLashawn  Code status: DNR DNI     4M AGE-FRIENDLY INITIATIVE:  What matters most to patient: Safety at home  Medications: decrease celexa and discontinue oxybutynin  Mentation: oriented  only to self at baseline  Mobility: ambulates with walker       Malnutrition Diagnosis Status: New  Malnutrition Diagnosis: Moderate malnutrition related to chronic disease or condition  As Evidenced by: mild to moderate muscle loss and subcutaneous fat loss.  I agree with the dietitian's malnutrition diagnosis.           4M AGE-FRIENDLY INITIATIVE:  What matters most to patient:   Medications:   Mentation:   Mobility:     Geriatric medicine will continue to follow the patient. Thank you for allowing geriatric medicine to be involved in the care of your patient. Geriatric medicine consultation team is available during work hours Monday through Friday. For any emergency issues requiring immediate assistance over the weekend, please page Geriatrics pager [phone]    Janet Santos MD

## 2025-01-14 NOTE — PROGRESS NOTES
Patient was accepted at the [place], which is [facility]. EDOD is 2 days. Patient is still pending a OT evaluation. SW will continue to follow to facilitate discharge plan.       NERY Francois

## 2025-01-14 NOTE — SIGNIFICANT EVENT
Patient with mixed density left convexity subdural hematoma. Imaging reviewed included repeat CTH, which was stable. Spoke extensively with family regarding possibility of pursuing an MMA embolization. After discussions, family elected to not proceed with the procedure at this time. No acute neurosurgical intervention or additional neuroimaging needed at this time. Prophylactic anticoagulation allowed on post-bleed day 2. Patient needs follow up at 2 weeks, which will be arranged by our team. Thank you for allowing us to participate in the care of this patient. Will sign off at this time. Please page [phone] with any questions or concerns.    Demar Franklin MD  PGY-1 Neurosurgery  10:50 AM

## 2025-01-14 NOTE — CARE PLAN
The patient's goals for the shift include      The clinical goals for the shift include pt will remain free from injury

## 2025-01-15 LAB
ALBUMIN SERPL BCP-MCNC: 4.1 G/DL (ref 3.4–5)
ANION GAP SERPL CALC-SCNC: 15 MMOL/L (ref 10–20)
BUN SERPL-MCNC: 27 MG/DL (ref 6–23)
CALCIUM SERPL-MCNC: 9.9 MG/DL (ref 8.6–10.6)
CHLORIDE SERPL-SCNC: 106 MMOL/L (ref 98–107)
CO2 SERPL-SCNC: 22 MMOL/L (ref 21–32)
CREAT SERPL-MCNC: 0.69 MG/DL (ref 0.5–1.05)
EGFRCR SERPLBLD CKD-EPI 2021: 84 ML/MIN/1.73M*2
ERYTHROCYTE [DISTWIDTH] IN BLOOD BY AUTOMATED COUNT: 12.2 % (ref 11.5–14.5)
GLUCOSE SERPL-MCNC: 85 MG/DL (ref 74–99)
HCT VFR BLD AUTO: 46.3 % (ref 36–46)
HGB BLD-MCNC: 16 G/DL (ref 12–16)
MAGNESIUM SERPL-MCNC: 2.31 MG/DL (ref 1.6–2.4)
MCH RBC QN AUTO: 33.5 PG (ref 26–34)
MCHC RBC AUTO-ENTMCNC: 34.6 G/DL (ref 32–36)
MCV RBC AUTO: 97 FL (ref 80–100)
NRBC BLD-RTO: 0 /100 WBCS (ref 0–0)
PHOSPHATE SERPL-MCNC: 3.2 MG/DL (ref 2.5–4.9)
PLATELET # BLD AUTO: 295 X10*3/UL (ref 150–450)
POTASSIUM SERPL-SCNC: 4.1 MMOL/L (ref 3.5–5.3)
RBC # BLD AUTO: 4.78 X10*6/UL (ref 4–5.2)
SODIUM SERPL-SCNC: 139 MMOL/L (ref 136–145)
WBC # BLD AUTO: 7.6 X10*3/UL (ref 4.4–11.3)

## 2025-01-15 PROCEDURE — 2500000002 HC RX 250 W HCPCS SELF ADMINISTERED DRUGS (ALT 637 FOR MEDICARE OP, ALT 636 FOR OP/ED)

## 2025-01-15 PROCEDURE — 1100000001 HC PRIVATE ROOM DAILY

## 2025-01-15 PROCEDURE — 36415 COLL VENOUS BLD VENIPUNCTURE: CPT

## 2025-01-15 PROCEDURE — 85027 COMPLETE CBC AUTOMATED: CPT

## 2025-01-15 PROCEDURE — 2500000001 HC RX 250 WO HCPCS SELF ADMINISTERED DRUGS (ALT 637 FOR MEDICARE OP)

## 2025-01-15 PROCEDURE — 2500000004 HC RX 250 GENERAL PHARMACY W/ HCPCS (ALT 636 FOR OP/ED): Performed by: SURGERY

## 2025-01-15 PROCEDURE — 2500000004 HC RX 250 GENERAL PHARMACY W/ HCPCS (ALT 636 FOR OP/ED)

## 2025-01-15 PROCEDURE — 99232 SBSQ HOSP IP/OBS MODERATE 35: CPT | Performed by: INTERNAL MEDICINE

## 2025-01-15 PROCEDURE — 82565 ASSAY OF CREATININE: CPT

## 2025-01-15 PROCEDURE — 83735 ASSAY OF MAGNESIUM: CPT

## 2025-01-15 RX ADMIN — DONEPEZIL HYDROCHLORIDE 10 MG: 10 TABLET, FILM COATED ORAL at 20:27

## 2025-01-15 RX ADMIN — ENOXAPARIN SODIUM 30 MG: 100 INJECTION SUBCUTANEOUS at 08:42

## 2025-01-15 RX ADMIN — METOPROLOL TARTRATE 12.5 MG: 25 TABLET, FILM COATED ORAL at 20:26

## 2025-01-15 RX ADMIN — LEVOTHYROXINE SODIUM 75 MCG: 75 TABLET ORAL at 08:43

## 2025-01-15 RX ADMIN — PANTOPRAZOLE SODIUM 40 MG: 40 TABLET, DELAYED RELEASE ORAL at 06:20

## 2025-01-15 RX ADMIN — ENOXAPARIN SODIUM 30 MG: 100 INJECTION SUBCUTANEOUS at 20:27

## 2025-01-15 RX ADMIN — SIMVASTATIN 20 MG: 20 TABLET, FILM COATED ORAL at 08:43

## 2025-01-15 RX ADMIN — ACETAMINOPHEN 975 MG: 325 TABLET ORAL at 20:26

## 2025-01-15 RX ADMIN — ACETAMINOPHEN 975 MG: 325 TABLET ORAL at 11:36

## 2025-01-15 RX ADMIN — METOPROLOL TARTRATE 12.5 MG: 25 TABLET, FILM COATED ORAL at 08:43

## 2025-01-15 RX ADMIN — CITALOPRAM HYDROBROMIDE 20 MG: 20 TABLET ORAL at 08:43

## 2025-01-15 RX ADMIN — POLYETHYLENE GLYCOL 3350 17 G: 17 POWDER, FOR SOLUTION ORAL at 08:42

## 2025-01-15 ASSESSMENT — COGNITIVE AND FUNCTIONAL STATUS - GENERAL
TURNING FROM BACK TO SIDE WHILE IN FLAT BAD: A LITTLE
DRESSING REGULAR LOWER BODY CLOTHING: A LITTLE
DAILY ACTIVITIY SCORE: 16
TOILETING: A LOT
CLIMB 3 TO 5 STEPS WITH RAILING: A LOT
HELP NEEDED FOR BATHING: A LOT
DRESSING REGULAR LOWER BODY CLOTHING: A LITTLE
DRESSING REGULAR UPPER BODY CLOTHING: A LITTLE
PERSONAL GROOMING: A LITTLE
MOBILITY SCORE: 14
WALKING IN HOSPITAL ROOM: A LOT
MOVING FROM LYING ON BACK TO SITTING ON SIDE OF FLAT BED WITH BEDRAILS: A LITTLE
TOILETING: A LOT
PERSONAL GROOMING: A LITTLE
CLIMB 3 TO 5 STEPS WITH RAILING: A LOT
HELP NEEDED FOR BATHING: A LOT
DAILY ACTIVITIY SCORE: 16
MOBILITY SCORE: 14
DRESSING REGULAR UPPER BODY CLOTHING: A LITTLE
MOVING TO AND FROM BED TO CHAIR: A LOT
EATING MEALS: A LITTLE
MOVING FROM LYING ON BACK TO SITTING ON SIDE OF FLAT BED WITH BEDRAILS: A LITTLE
TURNING FROM BACK TO SIDE WHILE IN FLAT BAD: A LITTLE
MOVING TO AND FROM BED TO CHAIR: A LOT
STANDING UP FROM CHAIR USING ARMS: A LOT
WALKING IN HOSPITAL ROOM: A LOT
EATING MEALS: A LITTLE
STANDING UP FROM CHAIR USING ARMS: A LOT

## 2025-01-15 ASSESSMENT — PAIN SCALES - GENERAL
PAINLEVEL_OUTOF10: 0 - NO PAIN

## 2025-01-15 ASSESSMENT — PAIN - FUNCTIONAL ASSESSMENT
PAIN_FUNCTIONAL_ASSESSMENT: 0-10

## 2025-01-15 NOTE — CARE PLAN
Orthopaedic Surgery Spine Updated Care Plan Note    Upright radiographs reviewed with Dr. Lawrence and determined to be stable.    Recommendations:  - No acute orthopaedic spine intervention  - No heavy lifting, bending or twisting  - Patient should follow up w/ Dr. Lawrence in 1-2 weeks after discharge for post-operative appointment (patient may call [phone] to schedule).   - Orthopaedic Spine will continue to follow peripherally while in house    Juarez Pearce MD  Orthopedic Surgery PGY-2  [facility]  Pager: [phone]  Available by Epic Chat    On weekends and after 6PM:  At [facility] Main: Please reach out to the orthopaedic on-call resident ([phone])  At [facility]: Please reach out to the orthopaedic on-call FLORY or resident (please refer to Qgenda)    While admitted, this patient will be followed by the Ortho Spine Team. Please contact below residents with any questions (available via Epic Chat).     First call: Juarez Pearce, PGY-2   Second call: Yahir Goff, PGY-4  Third call: Tejinder Lagunas, Fellow

## 2025-01-15 NOTE — PROGRESS NOTES
[facility]  TRAUMA SERVICE - PROGRESS NOTE    Patient Name: Gabrielle Turner  MRN: [id number]  Admit Date: 111  : 1935  AGE: 89 y.o.   GENDER: female  ==============================================================================  Pt is an 89 YOF who arrived as a transfer after being found on her hands and knees at home with presumed unwitnessed fall. Per patient family she has a history of dementia and at best is alert to self only. Trauma scans at outside hospital revealed an acute on chronic SDH and T9 compression deformity. Patient was transferred to [facility] for repeat scan and and NSGY consult.  LOC (yes/no?): Unknown  Anticoagulant / Anti-platelet Rx? (for what dx?): None  Referring Facility Name (N/A for scene EMR run): [facility]     INJURIES:   SDH Acute on Chronic  T9 Inferior Endplate Deformity     OTHER MEDICAL PROBLEMS:  Dementia  HTN  HLD  Hypothyroidism  GERD  PVD     INCIDENTAL FINDINGS:  Small hiatal hernia  Scattered colonic diverticula   4 mm LLL nodule    PROCEDURES:  Non-op    ==============================================================================  TODAY'S ASSESSMENT AND PLAN OF CARE:  ##SDH  - Neurosurgery consulted  - [facility] stable, no further imaging need at this time  - q4h neuro checks  - EEG negative for epileptiform changes  - family not interested in MMA  - lab holiday        ##T9 Compression Deformity  - Ortho spine consulted awaiting recs: upright T-spine when able. Will follow up in ortho clinic in 2wks  -T/L spine completed     #Comorbidities  -Geriatrics:   -stop amlodipine  -dc docusate, start senna  -BID miralax  -stop doxbutynin, to resume tolterodine at DC  -increase tylenol to 975 TID  -discharge med recs:   -DC docusate   -decreased amlodipine 2.5mg   -continue reduced citalopram 20   -resume tolterodine    Nutrition:   -continue regular diet, gelatein plus supplement BID  -DVT Prophy: lovenox and SCDs    Dispo: RNF for now, medically  ready for DC, pending precert for SNF with wheeled walker on DC  -Will need outpatient endocrinology referral for osteoporosis management  -Geriatrics referral outpatient  -SNF to repeat TSH in 6-8wks    Patient was seen with Dr. Guzman Valenzuela PGY1  Trauma Surgery  ==============================================================================  CHIEF COMPLAINT / OVERNIGHT EVENTS:   NAEON.  Pain well controlled  No n/v, having flatus, BM    MEDICAL HISTORY / ROS:  Admission history and ROS reviewed. Pertinent changes as follows:  As mentioned above    PHYSICAL EXAM:  Heart Rate:  [61-83]   Temp:  [35.6 °C (96.1 °F)-37 °C (98.6 °F)]   Resp:  [16-18]   BP: (114-149)/(74-88)   SpO2:  [96 %-99 %]   Physical Exam  HENT:      Head: Normocephalic.      Right Ear: External ear normal.      Left Ear: External ear normal.      Nose: Nose normal.   Eyes:      General:         Right eye: No discharge.         Left eye: No discharge.   Cardiovascular:      Rate and Rhythm: Normal rate.   Pulmonary:      Effort: Pulmonary effort is normal.   Abdominal:      General: There is no distension.      Palpations: Abdomen is soft.   Musculoskeletal:         General: Normal range of motion.      Cervical back: Normal range of motion.   Skin:     General: Skin is warm and dry.   Neurological:      Mental Status: She is alert. Mental status is at baseline.           IMAGING SUMMARY:  (summary of new imaging findings, not a copy of dictation)  CT Head/Face: Similar appearance of a mixed density collection over the left cerebral convexity measuring up to 2.0 cm in thickness. This likely represents an acute on chronic subdural hematoma. Mild underlying cortical sulcal effacement and rightward midline shift, similar to prior.   CT C-Spine: No acute fracture or traumatic subluxation of the cervical spine.   CT Chest/Abd/Pelvis: No acute abnormality of the chest, abdomen and pelvis. There is compression deformity along the  inferior endplate of the T9   vertebral body with approximate 25% loss of vertebral body height. No acute fracture or traumatic subluxation of the lumbar spine.   CXR/PXR: Probable chronic lung changes.     LABS:  Results from last 7 days   Lab Units 01/15/25  0755 01/14/25  0715 01/13/25  2018 01/11/25  2317 01/11/25  1502   WBC AUTO x10*3/uL 7.6 7.6 8.4 8.8 7.1   HEMOGLOBIN g/dL 16.0 14.4 15.2 14.1 13.7   HEMATOCRIT % 46.3* 41.1 44.0 38.6 39.4   PLATELETS AUTO x10*3/uL 295 257 295 253 237   NEUTROS PCT AUTO %  --   --   --  73.9 68.4   LYMPHS PCT AUTO %  --   --   --  13.6 18.8   MONOS PCT AUTO %  --   --   --  10.6 10.2   EOS PCT AUTO %  --   --   --  0.7 1.7     Results from last 7 days   Lab Units 01/11/25 2317   APTT seconds 31   INR  1.2*     Results from last 7 days   Lab Units 01/15/25  0755 01/14/25  0715 01/13/25  2018 01/11/25  2317 01/11/25  1502   SODIUM mmol/L 139 137 136 139 139   POTASSIUM mmol/L 4.1 3.3* 3.8 3.7 3.5   CHLORIDE mmol/L 106 102 102 105 106   CO2 mmol/L 22 22 21 20* 24   BUN mg/dL 27* 36* 26* 18 21   CREATININE mg/dL 0.69 1.22* 1.41* 0.85 0.96   CALCIUM mg/dL 9.9 9.4 10.0 9.8 9.3   PROTEIN TOTAL g/dL  --   --   --  7.5 6.7   BILIRUBIN TOTAL mg/dL  --   --   --  0.8 0.4   ALK PHOS U/L  --   --   --  76 78   ALT U/L  --   --   --  8 8   AST U/L  --   --   --  15 13   GLUCOSE mg/dL 85 96 117* 102* 105*     Results from last 7 days   Lab Units 01/11/25 2317 01/11/25  1502   BILIRUBIN TOTAL mg/dL 0.8 0.4           I have reviewed all medications, laboratory results, and imaging pertinent for today's encounter.

## 2025-01-15 NOTE — PROGRESS NOTES
[facility]  TRAUMA SERVICE - PROGRESS NOTE    Patient Name: Gabrielle Turner  MRN: [id number]  Admit Date: 111  : 1935  AGE: 89 y.o.   GENDER: female  ==============================================================================  Pt is an 89 YOF who arrived as a transfer after being found on her hands and knees at home with presumed unwitnessed fall. Per patient family she has a history of dementia and at best is alert to self only. Trauma scans at outside hospital revealed an acute on chronic SDH and T9 compression deformity. Patient was transferred to [facility] for repeat scan and and NSGY consult.  LOC (yes/no?): Unknown  Anticoagulant / Anti-platelet Rx? (for what dx?): None  Referring Facility Name (N/A for scene EMR run): [facility]     INJURIES:   SDH Acute on Chronic  T9 Inferior Endplate Deformity     OTHER MEDICAL PROBLEMS:  Dementia  HTN  HLD  Hypothyroidism  GERD  PVD     INCIDENTAL FINDINGS:  Small hiatal hernia  Scattered colonic diverticula   4 mm LLL nodule    PROCEDURES:  Non-op    ==============================================================================  TODAY'S ASSESSMENT AND PLAN OF CARE:  ##SDH  - Neurosurgery consulted  - [facility] stable, no further imaging need at this time  - q4h neuro checks  - EEG negative for epileptiform changes  - will consider MMA embolization if family is amenable        ##T9 Compression Deformity  - Ortho spine consulted awaiting recs: upright T-spine when able. Will follow up in ortho clinic in 2wks  -T/L spine completed     #Comorbidities  -Geriatrics:   -stop amlodipine  -dc docusate, start senna  -BID miralax  -stop doxbutynin, to resume tolterodine at DC  -increase tylenol to 975 TID  -discharge med recs:   -DC docusate   -decreased amlodipine 2.5mg   -continue reduced citalopram 20   -resume tolterodine    Nutrition:   -continue regular diet, gelatein plus supplement BID  -DVT Prophy: lovenox and SCDs    Dispo: RNF for now,  medically ready for DC, pending precert for SNF with wheeled walker on DC  -Will need outpatient endocrinology referral for osteoporosis management  -Geriatrics referral outpatient  -SNF to repeat TSH in 6-8wks    Patient was seen with Dr. Guzman Valenzuela PGY1  Trauma Surgery  ==============================================================================  CHIEF COMPLAINT / OVERNIGHT EVENTS:   NAEON.  Pain well controlled  No n/v, no flatus or BM.  Family does not wish to proceed with [facility] embo at this time    MEDICAL HISTORY / ROS:  Admission history and ROS reviewed. Pertinent changes as follows:  As mentioned above    PHYSICAL EXAM:  Heart Rate:  [59-90]   Temp:  [35.8 °C (96.4 °F)-37.2 °C (99 °F)]   Resp:  [17-18]   BP: ()/(58-80)   SpO2:  [95 %-100 %]   Physical Exam  HENT:      Head: Normocephalic.      Right Ear: External ear normal.      Left Ear: External ear normal.      Nose: Nose normal.   Eyes:      General:         Right eye: No discharge.         Left eye: No discharge.   Cardiovascular:      Rate and Rhythm: Normal rate.   Pulmonary:      Effort: Pulmonary effort is normal.   Abdominal:      General: There is no distension.      Palpations: Abdomen is soft.   Musculoskeletal:         General: Normal range of motion.      Cervical back: Normal range of motion.   Skin:     General: Skin is warm and dry.   Neurological:      Mental Status: She is alert. Mental status is at baseline.           IMAGING SUMMARY:  (summary of new imaging findings, not a copy of dictation)  CT Head/Face: Similar appearance of a mixed density collection over the left cerebral convexity measuring up to 2.0 cm in thickness. This likely represents an acute on chronic subdural hematoma. Mild underlying cortical sulcal effacement and rightward midline shift, similar to prior.   CT C-Spine: No acute fracture or traumatic subluxation of the cervical spine.   CT Chest/Abd/Pelvis: No acute abnormality of the  chest, abdomen and pelvis. There is compression deformity along the inferior endplate of the T9   vertebral body with approximate 25% loss of vertebral body height. No acute fracture or traumatic subluxation of the lumbar spine.   CXR/PXR: Probable chronic lung changes.     LABS:  Results from last 7 days   Lab Units 01/14/25 0715 01/13/25 2018 01/11/25 2317 01/11/25  1502   WBC AUTO x10*3/uL 7.6 8.4 8.8 7.1   HEMOGLOBIN g/dL 14.4 15.2 14.1 13.7   HEMATOCRIT % 41.1 44.0 38.6 39.4   PLATELETS AUTO x10*3/uL 257 295 253 237   NEUTROS PCT AUTO %  --   --  73.9 68.4   LYMPHS PCT AUTO %  --   --  13.6 18.8   MONOS PCT AUTO %  --   --  10.6 10.2   EOS PCT AUTO %  --   --  0.7 1.7     Results from last 7 days   Lab Units 01/11/25 2317   APTT seconds 31   INR  1.2*     Results from last 7 days   Lab Units 01/14/25 0715 01/13/25 2018 01/11/25 2317 01/11/25  1502   SODIUM mmol/L 137 136 139 139   POTASSIUM mmol/L 3.3* 3.8 3.7 3.5   CHLORIDE mmol/L 102 102 105 106   CO2 mmol/L 22 21 20* 24   BUN mg/dL 36* 26* 18 21   CREATININE mg/dL 1.22* 1.41* 0.85 0.96   CALCIUM mg/dL 9.4 10.0 9.8 9.3   PROTEIN TOTAL g/dL  --   --  7.5 6.7   BILIRUBIN TOTAL mg/dL  --   --  0.8 0.4   ALK PHOS U/L  --   --  76 78   ALT U/L  --   --  8 8   AST U/L  --   --  15 13   GLUCOSE mg/dL 96 117* 102* 105*     Results from last 7 days   Lab Units 01/11/25 2317 01/11/25  1502   BILIRUBIN TOTAL mg/dL 0.8 0.4           I have reviewed all medications, laboratory results, and imaging pertinent for today's encounter.

## 2025-01-15 NOTE — PROGRESS NOTES
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  She is oriented only to person.  She appears restless.  No other acute events overnight.  Refuses to count backwards.    Objective     Current Facility-Administered Medications   Medication Dose Route Frequency Provider Last Rate Last Admin    acetaminophen (Tylenol) tablet 975 mg  975 mg oral q8h Edyta Valenzuela MD   975 mg at 01/15/25 1136    citalopram (CeleXA) tablet 20 mg  20 mg oral Daily Edyta Valenzuela MD   20 mg at 01/15/25 0843    donepezil (Aricept) tablet 10 mg  10 mg oral Nightly Ahmad A Cee, APRN-CNP   10 mg at 01/14/25 2013    enoxaparin (Lovenox) syringe 30 mg  30 mg subcutaneous BID Edyta Valenzuela MD   30 mg at 01/15/25 0842    levothyroxine (Synthroid, Levoxyl) tablet 75 mcg  75 mcg oral Daily Ahmad A Cee, APRN-CNP   75 mcg at 01/15/25 0843    metoprolol tartrate (Lopressor) tablet 12.5 mg  12.5 mg oral BID Ahmad A Cee, APRN-CNP   12.5 mg at 01/15/25 0843    oxygen (O2) therapy   inhalation Continuous PRN - O2/gases Ahmad A Cee, APRN-CNP        pantoprazole (ProtoNix) EC tablet 40 mg  40 mg oral Daily before breakfast Ahmad A Cee, APRN-CNP   40 mg at 01/15/25 0620    polyethylene glycol (Glycolax, Miralax) packet 17 g  17 g oral BID Anselmo Hinds DO   17 g at 01/15/25 0842    sennosides (Senokot) tablet 8.6 mg  1 tablet oral Nightly Edyta Valenzuela MD        simvastatin (Zocor) tablet 20 mg  20 mg oral Daily Ahmad A Cee, APRN-CNP   20 mg at 01/15/25 0843       Physical Exam  GEN: Alert, oriented to person only, not to place and time, not pale, not jaundiced, well hydrated  CVS: HS I +II, regular, no murmurs  RESP: Diminished air entry  ABD: Full, soft, BS present, nontender, no palpable organs,   EXT: No bilateral leg edema    Confusion Assessment Method(CAM) for diagnosis of delirium:    1.  Acute onset or fluctuating  course: absent/present: Absent  2.  Inattention: absent/present: Absent  3.  Disorganized thinking: absent/present: Absent  4.  Altered level of consciousness: absent/present: Absent  CAM: negative    AT Score For Assessment of Delirium and Cognitive Impairment:    Alertness: 0  Normal(fully alert,but not agitated, throughout assessment)=0  Mild sleepiness for <10 seconds after walking, then normal=0  Clearly abnormal=4  2.  AMT4: 2  No mistakes=0  One mistake=1  Two or more mistakes/untestable=2  3.  Attention: 2  Achieves seven months or more correctly=0  Starts but scores <7 months/ refuses to start=1  Untestable(cannot start because unwell, drowsy, inattentive)=2  4.  Acute: 0  No=0  Yes=4    Total Score: 4  4 or above: Possible delirium +/- cognitive impairment  1-3: Possible cognitive impairment  0: Delirium or severe cognitive impairment unlikely(but delirium still possible if (4) information incomplete)      Last Recorded Vitals      1/14/2025    11:08 AM 1/14/2025     3:22 PM 1/14/2025     8:09 PM 1/14/2025    11:17 PM 1/15/2025     4:49 AM 1/15/2025     8:34 AM 1/15/2025    12:00 PM   Vitals   Systolic 124 104 130 137 149 121 129   Diastolic 73 61 80 85 88 74 80   BP Location Right arm Right arm Right arm Right arm Right arm  Right arm   Heart Rate 59 71 83 61 69 73 70   Temp 36.5 °C (97.7 °F) 36.3 °C (97.3 °F) 36.3 °C (97.3 °F) 35.6 °C (96.1 °F) 35.8 °C (96.4 °F) 36.7 °C (98 °F) 37 °C (98.6 °F)   Resp 18 18 18 18 18 16 18      Vitals:    01/12/25 2232   Weight: 51.7 kg (114 lb)        Relevant Results  Lab Results   Component Value Date    TSH 14.03 (H) 01/13/2025    [id number] 331 01/13/2025    VITD25 85 01/13/2025     Results from last 7 days   Lab Units 01/15/25  0755 01/14/25  0715 01/13/25 2018 01/11/25  2317 01/11/25  1502   WBC AUTO x10*3/uL 7.6 7.6 8.4 8.8 7.1   HEMOGLOBIN g/dL 16.0 14.4 15.2 14.1 13.7   HEMATOCRIT % 46.3* 41.1 44.0 38.6 39.4   ALT U/L  --   --   --  8 8   AST U/L  --   --   --   15 13   SODIUM mmol/L 139 137 136 139 139   POTASSIUM mmol/L 4.1 3.3* 3.8 3.7 3.5   CHLORIDE mmol/L 106 102 102 105 106   CREATININE mg/dL 0.69 1.22* 1.41* 0.85 0.96   BUN mg/dL 27* 36* 26* 18 21   CO2 mmol/L 22 22 21 20* 24   INR   --   --   --  1.2*  --           XR thoracic spine 2 views, XR lumbar spine 2-3 views  Narrative: Interpreted By:  Buzz Roman,  and Eddie Newton   STUDY:  Thoracic spine, 2 views.  Lumbar spine, 2 views.      INDICATION:  Signs/Symptoms:standing uprights when able.      COMPARISON:  CT of the thoracic and lumbar spine 01/11/2025..      ACCESSION NUMBER(S):  [id number]; [id number]      ORDERING CLINICIAN:  SUNITA KHAN      FINDINGS:  THORACIC SPINE:  Mild S- shaped curvature of the thoracolumbar spine.  There is again mild compression deformity involving the inferior  endplate of the T9 vertebral body, better characterized on CT of the  thoracic spine from 01/11/2025. Vertebral body heights are otherwise  well maintained. Posterior elements appear to be intact.      LUMBAR SPINE:  Mild S shaped curvature of the thoracolumbar spine.  There is grade 1 anterolisthesis of L3 on L4 and L4 on L5.  Vertebral body heights are well-maintained. Posterior elements appear  to be intact. Moderate facet/degenerative changes are present.      Impression: 1. Mild compression deformity involving the inferior endplate of the  T9 vertebral body, better characterized on CT of the thoracic spine  from 01/11/2025.  2. No evidence of acute compression deformity of the lumbar spine  with grade 1 anterolisthesis of L3 on L4 and L4 on L5.  3. S-shaped curvature of the thoracolumbar spine.  4. Please see dedicated imaging of the thoracic and lumbar spine for  further characterization of the degenerative changes. \      I personally reviewed the images/study and I agree with the findings  as stated by resident Aman Morgan. This study was interpreted  at [facility]  [place], [place].      MACRO:  None.      Signed by: Buzz Roman 1/14/2025 9:54 AM  Dictation workstation:   [id number]    DATA:  EKG: QTC  Encounter Date: 01/11/25   ECG 12 lead   Result Value    Ventricular Rate 63    Atrial Rate 63    FL Interval 175    QRS Duration 86    QT Interval 468    QTC Calculation(Bazett) 480    P Axis 67    R Axis 44    T Axis 64    QRS Count 11    Q Onset 249    T Offset 483    QTC Fredericia 475    Narrative    Sinus rhythm  Atrial premature complex  Left atrial enlargement      Anti-psychotics in 48 hours: No  Opioids/Benzodiazepines in 48 hours: No  Anticholinergics on board:No  Restraints:No  Indwelling catheters:No  Last BM: 1/14/25  UO in 24 hours: Multiple voids  Activity in the past 24 hours: Working Pt/OT  Need for ambulatory devices: Assist x 1, rollator      Assessment/Plan   89-year-old female with past medical history of osteoporosis, hypertension, hypothyroidism, hyperlipidemia, GERD, dementia, oriented only to self at baseline who comes in after an unwitnessed fall and found to have a subdural hematoma.  Geriatrics was consulted for dementia and multiple comorbidities.    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  Please consider the following general measures for minimizing delirium in a hospitalized patient:   -Bright lights during the day, keeps blinds up, switch all lights on   -avoid disturbances at night. Encourage at least 6 hours uninterrupted sleep. Consider d/c 4am vitals check  -avoid benzodiazepines, sedatives. Minimize opioids   -avoid  anti-cholinergics    -avoid restraints.   -daily orientation to time and place by the staff   -out of bed to chair few hours everyday  - encourage stimulating activities during the day if possible    Continue scheduled Tylenol for pain  Continue on reduced dose of citalopram and amlodipine  Do not resume docusate or Ativan at discharge  Repeat TSH in SNF in 6-8 weeks  Patient follow-up with endocrinology for osteoporosis management  Referral to Geriatric Outpatient clinic: [facility], [facility] [facility], [address], [phone]  Resume tolterodine at discharge    4M AGE-FRIENDLY INITIATIVE:  What matters most to patient: Being safe  Medications: Not currently on any inappropriate meds for the older adult  Mentation: CAM negative, 4 AT 4  Mobility: Ambulates with a walker    Geriatric medicine will sign off this patient. Thank you for allowing geriatric medicine to be involved in the care of your patient. Geriatric medicine consultation team is available during work hours Monday through Friday. For any emergency issues requiring immediate assistance over the weekend, please page Geriatrics pager [phone]    Nazia Herrera MD

## 2025-01-16 VITALS
TEMPERATURE: 97.5 F | SYSTOLIC BLOOD PRESSURE: 116 MMHG | HEIGHT: 60 IN | WEIGHT: 114 LBS | RESPIRATION RATE: 18 BRPM | HEART RATE: 83 BPM | BODY MASS INDEX: 22.38 KG/M2 | DIASTOLIC BLOOD PRESSURE: 81 MMHG | OXYGEN SATURATION: 95 %

## 2025-01-16 PROCEDURE — 2500000004 HC RX 250 GENERAL PHARMACY W/ HCPCS (ALT 636 FOR OP/ED)

## 2025-01-16 PROCEDURE — 97116 GAIT TRAINING THERAPY: CPT | Mod: GP | Performed by: STUDENT IN AN ORGANIZED HEALTH CARE EDUCATION/TRAINING PROGRAM

## 2025-01-16 PROCEDURE — 2500000004 HC RX 250 GENERAL PHARMACY W/ HCPCS (ALT 636 FOR OP/ED): Performed by: SURGERY

## 2025-01-16 PROCEDURE — 2500000002 HC RX 250 W HCPCS SELF ADMINISTERED DRUGS (ALT 637 FOR MEDICARE OP, ALT 636 FOR OP/ED)

## 2025-01-16 PROCEDURE — 99238 HOSP IP/OBS DSCHRG MGMT 30/<: CPT | Performed by: SURGERY

## 2025-01-16 PROCEDURE — 2500000001 HC RX 250 WO HCPCS SELF ADMINISTERED DRUGS (ALT 637 FOR MEDICARE OP)

## 2025-01-16 PROCEDURE — 97530 THERAPEUTIC ACTIVITIES: CPT | Mod: GP | Performed by: STUDENT IN AN ORGANIZED HEALTH CARE EDUCATION/TRAINING PROGRAM

## 2025-01-16 RX ORDER — ACETAMINOPHEN 325 MG/1
975 TABLET ORAL EVERY 8 HOURS
Start: 2025-01-16

## 2025-01-16 RX ORDER — METOPROLOL TARTRATE 25 MG/1
12.5 TABLET, FILM COATED ORAL 2 TIMES DAILY
Start: 2025-01-16

## 2025-01-16 RX ORDER — PANTOPRAZOLE SODIUM 40 MG/1
40 TABLET, DELAYED RELEASE ORAL
Start: 2025-01-17

## 2025-01-16 RX ORDER — CITALOPRAM 20 MG/1
20 TABLET, FILM COATED ORAL DAILY
Start: 2025-01-17

## 2025-01-16 RX ORDER — ENOXAPARIN SODIUM 100 MG/ML
30 INJECTION SUBCUTANEOUS 2 TIMES DAILY
Start: 2025-01-16

## 2025-01-16 RX ORDER — AMLODIPINE BESYLATE 5 MG/1
2.5 TABLET ORAL DAILY
Start: 2025-01-16

## 2025-01-16 RX ORDER — LEVOTHYROXINE SODIUM 75 UG/1
75 TABLET ORAL DAILY
Start: 2025-01-17

## 2025-01-16 RX ORDER — POLYETHYLENE GLYCOL 3350 17 G/17G
17 POWDER, FOR SOLUTION ORAL 2 TIMES DAILY
Start: 2025-01-16

## 2025-01-16 RX ORDER — SENNOSIDES 8.6 MG/1
1 TABLET ORAL NIGHTLY
Start: 2025-01-16

## 2025-01-16 RX ADMIN — ENOXAPARIN SODIUM 30 MG: 100 INJECTION SUBCUTANEOUS at 08:29

## 2025-01-16 RX ADMIN — PANTOPRAZOLE SODIUM 40 MG: 40 TABLET, DELAYED RELEASE ORAL at 06:44

## 2025-01-16 RX ADMIN — ACETAMINOPHEN 975 MG: 325 TABLET ORAL at 12:30

## 2025-01-16 RX ADMIN — SIMVASTATIN 20 MG: 20 TABLET, FILM COATED ORAL at 08:29

## 2025-01-16 RX ADMIN — POLYETHYLENE GLYCOL 3350 17 G: 17 POWDER, FOR SOLUTION ORAL at 08:30

## 2025-01-16 RX ADMIN — LEVOTHYROXINE SODIUM 75 MCG: 75 TABLET ORAL at 08:29

## 2025-01-16 RX ADMIN — METOPROLOL TARTRATE 12.5 MG: 25 TABLET, FILM COATED ORAL at 08:29

## 2025-01-16 RX ADMIN — CITALOPRAM HYDROBROMIDE 20 MG: 20 TABLET ORAL at 08:29

## 2025-01-16 ASSESSMENT — COGNITIVE AND FUNCTIONAL STATUS - GENERAL
EATING MEALS: A LITTLE
STANDING UP FROM CHAIR USING ARMS: A LITTLE
STANDING UP FROM CHAIR USING ARMS: A LOT
MOVING TO AND FROM BED TO CHAIR: A LITTLE
MOVING TO AND FROM BED TO CHAIR: A LOT
HELP NEEDED FOR BATHING: A LOT
CLIMB 3 TO 5 STEPS WITH RAILING: A LITTLE
DRESSING REGULAR LOWER BODY CLOTHING: A LITTLE
TURNING FROM BACK TO SIDE WHILE IN FLAT BAD: A LITTLE
MOBILITY SCORE: 14
TOILETING: A LOT
MOBILITY SCORE: 18
TURNING FROM BACK TO SIDE WHILE IN FLAT BAD: A LITTLE
WALKING IN HOSPITAL ROOM: A LITTLE
CLIMB 3 TO 5 STEPS WITH RAILING: A LOT
WALKING IN HOSPITAL ROOM: A LOT
DAILY ACTIVITIY SCORE: 16
MOVING FROM LYING ON BACK TO SITTING ON SIDE OF FLAT BED WITH BEDRAILS: A LITTLE
MOVING FROM LYING ON BACK TO SITTING ON SIDE OF FLAT BED WITH BEDRAILS: A LITTLE
DRESSING REGULAR UPPER BODY CLOTHING: A LITTLE
PERSONAL GROOMING: A LITTLE

## 2025-01-16 ASSESSMENT — PAIN SCALES - GENERAL
PAINLEVEL_OUTOF10: 0 - NO PAIN

## 2025-01-16 ASSESSMENT — PAIN - FUNCTIONAL ASSESSMENT
PAIN_FUNCTIONAL_ASSESSMENT: 0-10

## 2025-01-16 NOTE — HOSPITAL COURSE
MECHANISM OF INJURY / CHIEF COMPLAINT:   Pt is an 89 YOF who arrived as a transfer after being found on her hands and knees at home with presumed unwitnessed fall. Per patient family she has a history of dementia and at best is alert to self only. Trauma scans at outside hospital revealed an acute on chronic SDH and T9 compression deformity. Patient was transferred to [facility] for repeat scan and and NSGY consult.  LOC (yes/no?): Unknown  Anticoagulant / Anti-platelet Rx? (for what dx?): None  Referring Facility Name (N/A for scene EMR run): [facility]     INJURIES:   SDH Acute on Chronic  T9 Inferior Endplate Deformity     OTHER MEDICAL PROBLEMS:  Dementia  HTN  HLD  Hypothyroidism  GERD  PVD     INCIDENTAL FINDINGS:  Small hiatal hernia  Scattered colonic diverticula   4 mm LLL nodule    Patient was non-op management. Did well during hospital stay. Was seen by geriatrics who helped manage inpatient medications. Worked with PT/OT who recommended SNF. On day of discharge, patient was hemodynamically normal, without pain, nausea. Having bowel function. All questions answered.

## 2025-01-16 NOTE — CARE PLAN
Problem: Pain - Adult  Goal: Verbalizes/displays adequate comfort level or baseline comfort level  Outcome: Progressing     Problem: Safety - Adult  Goal: Free from fall injury  Outcome: Progressing     Problem: Skin  Goal: Promote skin healing  Outcome: Progressing  Flowsheets (Taken 1/15/2025 2206)  Promote skin healing:   Assess skin/pad under line(s)/device(s)   Turn/reposition every 2 hours/use positioning/transfer devices   The patient's goals for the shift include      The clinical goals for the shift include pt will remain free from injury

## 2025-01-16 NOTE — PROGRESS NOTES
Pre-cert obtained. Patient is medically ready and pending discharge to [place] [facility]. Transport requested for 4:30p (earliest available). Patient will need IMM, [facility]rod and 7000 prior to transfer. SW will continue to follow to facilitate discharge plan.       NERY Francois

## 2025-01-16 NOTE — DISCHARGE INSTRUCTIONS
- No heavy lifting, bending or twisting  - Patient should follow up w/ Dr. Lawrence in 1-2 weeks after discharge for post-operative appointment (patient may call [phone] to schedule).   - Follow up with neurosurgery in 1-2 weeks  -Continue lovenox until follow up with ortho  -SBP goal < 160 from neuro perspective

## 2025-01-16 NOTE — PROGRESS NOTES
Physical Therapy    Physical Therapy Treatment    Patient Name: Gabrielle Turner  MRN: [id number]  Today's Date: 1/16/2025  Room: [address] [place], [place] [address]A  Time Calculation  Start Time: 1015  Stop Time: 1040  Time Calculation (min): 25 min       Assessment/Plan   PT Plan  Treatment/Interventions: Bed mobility, Transfer training, Gait training, Stair training, Balance training, Neuromuscular re-education, Strengthening, Endurance training, Therapeutic exercise, Therapeutic activity, Home exercise program  PT Plan: Ongoing PT  PT Frequency: 3 times per week  Equipment Recommended upon Discharge:  (owns necessary equipment)  PT Recommended Transfer Status: Assist x1, Assistive device  PT - OK to Discharge: Yes    General Visit Information:   Reason for Referral: Acute SDH, T9 Inferior Endplate Deformity  Past Medical History Relevant to Rehab: Dementia, HTN, HLD, Hypothyroidism , GERD, PVD  Prior to Session Communication: Bedside nurse  Patient Position Received: Bed, 3 rail up, Alarm on   Subjective: Patient is alert, agreeable to PT.  Significantly confused this date with difficulty responding to questions and difficulty completing 2-3 word sentences.    Precautions:  Precautions  Medical Precautions: Fall precautions  Vital Signs:  Vital Signs (Past 2hrs)             Objective   Pain:  Pain Assessment  0-10 (Numeric) Pain Score: 0 - No pain (post 0)  Cognition:  Cognition  Orientation Level:  (oriented to self only with choices)  Lines/Tubes/Drains:  External Urinary Catheter (Active)   Number of days: 4        Continuous Medications/Drips:       PT Treatments:  Therapeutic Exercise  Therapeutic Exercise Performed: Yes  Therapeutic Exercise Activity 1: BLE PF, DF, heel slide, SLR mod verbal and visual cues 5 x 2  Therapeutic Exercise Activity 2: BUE targeting 3 x 1        Bed Mobility 1  Bed Mobility 1: Supine to sitting  Level of Assistance 1: Contact guard  Bed Mobility Comments 1: HOB 30, cues  Bed Mobility 2  Bed Mobility  2:  Sitting to supine  Level of Assistance 2: Contact guard  Ambulation/Gait Training 1  Surface 1: Level tile  Device 1: Rolling walker  Assistance 1: Minimum assistance  Quality of Gait 1:  (path deviation, frequent pausing, assist for device management)  Comments/Distance (ft) 1: 65, 90  Transfers  Transfer: Yes  Transfer 1  Transfer From 1: Sit to  Transfer to 1: Stand  Transfer Device 1: Walker  Transfer Level of Assistance 1: Minimal verbal cues, Contact guard  Trials/Comments 1: x4  Transfers 2  Transfer From 2: Stand to  Transfer to 2: Sit  Transfer Device 2: Walker  Transfer Level of Assistance 2: Contact guard  Trials/Comments 2: x4  Transfers 3  Transfer From 3: Bed to  Transfer to 3:  (bench)  Transfer Device 3: Walker  Transfer Level of Assistance 3: Minimum assistance  Trials/Comments 3: x1  Transfers 4  Transfer From 4:  (bench)  Transfer to 4: Bed  Transfer Device 4: Walker  Transfer Level of Assistance 4: Minimum assistance  Trials/Comments 4: x1        Outcome Measures:  [facility] Basic Mobility  Turning from your back to your side while in a flat bed without using bedrails: A little  Moving from lying on your back to sitting on the side of a flat bed without using bedrails: A little  Moving to and from bed to chair (including a wheelchair): A little  Standing up from a chair using your arms (e.g. wheelchair or bedside chair): A little  To walk in hospital room: A little  Climbing 3-5 steps with railing: A little  Basic Mobility - Total Score: 18      Education Documentation  Precautions, taught by Nelson Reed PT at 1/16/2025 11:04 AM.  Learner: Patient  Readiness: Acceptance  Method: Explanation  Response: Needs Reinforcement  Comment: Confusion    Body Mechanics, taught by Nelson Reed PT at 1/16/2025 11:04 AM.  Learner: Patient  Readiness: Acceptance  Method: Explanation  Response: Needs Reinforcement  Comment: Confusion    Mobility Training, taught by Nelson Reed PT at 1/16/2025  11:04 AM.  Learner: Patient  Readiness: Acceptance  Method: Explanation  Response: Needs Reinforcement  Comment: Confusion    Education Comments  No comments found.          OP EDUCATION:       Encounter Problems       Encounter Problems (Active)       PT Problem       Patient will complete supine to sit and sit to supine Supervision  (Progressing)       Start:  01/13/25    Expected End:  01/27/25            Patient will perform sit<>stand transfer with LRAD, and Supervision  (Progressing)       Start:  01/13/25    Expected End:  01/27/25            Patient will ambulate >100' with LRAD and Supervision  (Progressing)       Start:  01/13/25    Expected End:  01/27/25            Patient will ascend/descend 3 steps with Right Rail Ascending and supervision  (Progressing)       Start:  01/13/25    Expected End:  01/27/25               Pain - Adult              Assessment: Patient is progressing Well with therapy this date.  Patient able to complete multiple standing and gait trials this date.  No LOB with mobility but required continued verbal, visual and tactile cues for all activity.  Patient remains appropriate for MOD intensity therapy when medically appropriate for discharge from acute stay.  Will continue to follow.      01/16/25 at 11:11 AM   Nelson Reed, PT   Rehab Office: [phone]

## 2025-01-16 NOTE — DISCHARGE SUMMARY
Discharge Diagnosis  SDH (subdural hematoma) (Multi)    Issues Requiring Follow-Up  Follow up with neurosurgery clinic  Follow up with ortho-spine clinic    Test Results Pending At Discharge  Pending Labs       No current pending labs.            Hospital Course  MECHANISM OF INJURY / CHIEF COMPLAINT:   Pt is an 89 YOF who arrived as a transfer after being found on her hands and knees at home with presumed unwitnessed fall. Per patient family she has a history of dementia and at best is alert to self only. Trauma scans at outside hospital revealed an acute on chronic SDH and T9 compression deformity. [place] [facility] [place] and and NSGY consult.  LOC (yes/no?): Unknown  Anticoagulant / Anti-platelet Rx? (for what dx?): None  Referring Facility Name (N/A for scene EMR run): [facility]     INJURIES:   SDH Acute on Chronic  T9 Inferior Endplate Deformity     OTHER MEDICAL PROBLEMS:  Dementia  HTN  HLD  Hypothyroidism  GERD  PVD     INCIDENTAL FINDINGS:  Small hiatal hernia  Scattered colonic diverticula   4 mm LLL nodule    Patient was non-op management. Did well during hospital stay. Was seen by geriatrics who helped manage inpatient medications. Worked with PT/OT who recommended SNF. On day of discharge, patient was hemodynamically normal, without pain, nausea. Having bowel function. All questions answered.    Pertinent Physical Exam At Time of Discharge  Physical Exam  Physical Exam  HENT:      Head: Normocephalic.      Right Ear: External ear normal.      Left Ear: External ear normal.      Nose: Nose normal.   Eyes:      General:         Right eye: No discharge.         Left eye: No discharge.   Cardiovascular:      Rate and Rhythm: Normal rate.   Pulmonary:      Effort: Pulmonary effort is normal.   Abdominal:      General: There is no distension.      Palpations: Abdomen is soft.   Musculoskeletal:         General: Normal range of motion.      Cervical back: Normal range of motion.   Skin:      General: Skin is warm and dry.   Neurological:      Mental Status: She is alert. Mental status is at baseline.   Home Medications     Medication List      START taking these medications     acetaminophen 325 mg tablet; Commonly known as: Tylenol; Take 3 tablets   (975 mg) by mouth every 8 hours.   enoxaparin 30 mg/0.3 mL syringe; Commonly known as: Lovenox; Inject 0.3   mL (30 mg) under the skin 2 times a day.   metoprolol tartrate 25 mg tablet; Commonly known as: Lopressor; Take 0.5   tablets (12.5 mg) by mouth 2 times a day.; Replaces: metoprolol succinate   XL 25 mg 24 hr tablet   pantoprazole 40 mg EC tablet; Commonly known as: ProtoNix; Take 1 tablet   (40 mg) by mouth once daily in the morning. Take before meals. Do not   crush, chew, or split.; Start taking on: January 17, 2025; Replaces:   omeprazole 20 mg DR capsule   polyethylene glycol 17 gram packet; Commonly known as: Glycolax,   Miralax; Take 17 g by mouth 2 times a day.   sennosides 8.6 mg tablet; Commonly known as: Senokot; Take 1 tablet (8.6   mg) by mouth once daily at bedtime.     CHANGE how you take these medications     amLODIPine 5 mg tablet; Commonly known as: Norvasc; Take 0.5 tablets   (2.5 mg) by mouth once daily. as directed; What changed: how much to take   citalopram 20 mg tablet; Commonly known as: CeleXA; Take 1 tablet (20   mg) by mouth once daily.; Start taking on: January 17, 2025; What changed:   medication strength, how much to take   levothyroxine 75 mcg tablet; Commonly known as: Synthroid, Levoxyl; Take   1 tablet (75 mcg) by mouth once daily. Take on an empty stomach at the   same time each day, either 30 to 60 minutes prior to breakfast; Start   taking on: January 17, 2025; What changed: additional instructions     CONTINUE taking these medications     calcium carbonate-vitamin D3 600 mg-20 mcg (800 unit) tablet   cholecalciferol 50 mcg (2,000 unit) capsule; Commonly known as: Vitamin   D-3   donepezil 10 mg tablet; Commonly  known as: Aricept; Take 1 tablet (10   mg) by mouth once daily at bedtime.   omega-3 fatty acids-fish oil 300-1,000 mg capsule   simvastatin 20 mg tablet; Commonly known as: Zocor; Take 1 tablet (20   mg) by mouth once daily.   tolterodine LA 2 mg 24 hr capsule; Commonly known as: Detrol LA; Take 1   capsule (2 mg) by mouth once daily.     STOP taking these medications     docusate sodium 100 mg capsule; Commonly known as: Colace   LORazepam 0.5 mg tablet; Commonly known as: Ativan   metoprolol succinate XL 25 mg 24 hr tablet; Commonly known as:   Toprol-XL; Replaced by: metoprolol tartrate 25 mg tablet   omeprazole 20 mg DR capsule; Commonly known as: PriLOSEC; Replaced by:   pantoprazole 40 mg EC tablet   sulfamethoxazole-trimethoprim 800-160 mg tablet; Commonly known as:   Bactrim DS       Outpatient Follow-Up  Future Appointments   Date Time Provider Department Center   1/28/2025  1:30 PM POR [id number] CT [id number] [facility]   2/4/2025  1:30 PM Peg Edwards, APRN-CNP [id number] [facility]   4/8/2025 11:20 AM Allyn Solis MD [id number] [facility]       Edyta Valenzuela MD

## 2025-01-18 NOTE — DOCUMENTATION CLARIFICATION NOTE
"    PATIENT:               KAITLYNN HYATT  ACCT #:                  [id number]  MRN:                       [id number]  :                       1935  ADMIT DATE:       2025 9:49 PM  DISCH DATE:        2025 6:47 PM  RESPONDING PROVIDER #:        [id number]          PROVIDER RESPONSE TEXT:    I agree with dietician diagnosis of moderate malnutrition on 2025    CDI QUERY TEXT:    Clarification        Instruction:    Based on your assessment of the patient and the clinical information, please provide the requested documentation by clicking on the appropriate radio button and enter any additional information if prompted.    Question: Please further clarify this patient nutritional status as    When answering this query, please exercise your independent professional judgment. The fact that a question is being asked, does not imply that any particular answer is desired or expected.    The patient's clinical indicators include:  Clinical Information: 2025 DPN:\"89 YOF who arrived as a transfer after being found on her hands and knees at home with presumed unwitnessed fall. Per patient family she has a history of dementia and at best is alert to self only. Trauma scans at outside hospital revealed an acute on chronic SDH and T9 compression deformity. [place] [facility] [place] and and NSGY consult.\"    Clinical Indicators:2025 Nutrition consult:\"Patient has Malnutrition Diagnosis: Yes  Diagnosis Status: New  Malnutrition Diagnosis: Moderate malnutrition related to chronic disease or condition  As Evidenced by: mild to moderate muscle loss and subcutaneous fat loss.\"  BMI 22.26  2024 59kg   2025  51kg      Treatment :Nutrition consult ,Continue with regular diet , Gelatein Plus supplement BID (160 kcal, 20 g protein each    Risk Factors: advanced age, dementia  Options provided:  -- I agree with dietician diagnosis of moderate malnutrition on 2025  -- Other - I will " add my own diagnosis  -- Refer to Clinical Documentation Reviewer    Query created by: Vickie Mandel on 1/14/2025 8:33 AM      Electronically signed by:  JOSH KAUR MD 1/18/2025 11:54 AM

## 2025-01-25 LAB
ATRIAL RATE: 63 BPM
P AXIS: 67 DEGREES
PR INTERVAL: 175 MS
Q ONSET: 249 MS
QRS COUNT: 11 BEATS
QRS DURATION: 86 MS
QT INTERVAL: 468 MS
QTC CALCULATION(BAZETT): 480 MS
QTC FREDERICIA: 475 MS
R AXIS: 44 DEGREES
T AXIS: 64 DEGREES
T OFFSET: 483 MS
VENTRICULAR RATE: 63 BPM

## 2025-01-28 ENCOUNTER — APPOINTMENT (OUTPATIENT)
Dept: RADIOLOGY | Facility: CLINIC | Age: 89
End: 2025-01-28
Payer: MEDICARE

## 2025-02-04 ENCOUNTER — APPOINTMENT (OUTPATIENT)
Dept: NEUROSURGERY | Facility: HOSPITAL | Age: 89
End: 2025-02-04
Payer: MEDICARE

## 2025-02-07 ENCOUNTER — APPOINTMENT (OUTPATIENT)
Dept: RADIOLOGY | Facility: CLINIC | Age: 89
End: 2025-02-07
Payer: MEDICARE

## 2025-02-11 ENCOUNTER — HOSPITAL ENCOUNTER (OUTPATIENT)
Dept: RADIOLOGY | Facility: CLINIC | Age: 89
Discharge: HOME | End: 2025-02-11
Payer: MEDICARE

## 2025-02-11 DIAGNOSIS — S06.5XAA SDH (SUBDURAL HEMATOMA) (MULTI): ICD-10-CM

## 2025-02-11 PROCEDURE — 70450 CT HEAD/BRAIN W/O DYE: CPT

## 2025-02-18 ENCOUNTER — APPOINTMENT (OUTPATIENT)
Dept: NEUROSURGERY | Facility: HOSPITAL | Age: 89
End: 2025-02-18
Payer: MEDICARE

## 2025-02-24 ENCOUNTER — LAB REQUISITION (OUTPATIENT)
Dept: LAB | Facility: HOSPITAL | Age: 89
End: 2025-02-24
Payer: MEDICARE

## 2025-02-24 DIAGNOSIS — Z13.228 ENCOUNTER FOR SCREENING FOR OTHER METABOLIC DISORDERS: ICD-10-CM

## 2025-02-24 LAB
ANION GAP SERPL CALC-SCNC: 15 MMOL/L (ref 10–20)
BUN SERPL-MCNC: 23 MG/DL (ref 6–23)
CALCIUM SERPL-MCNC: 9.2 MG/DL (ref 8.6–10.3)
CHLORIDE SERPL-SCNC: 103 MMOL/L (ref 98–107)
CO2 SERPL-SCNC: 27 MMOL/L (ref 21–32)
CREAT SERPL-MCNC: 0.7 MG/DL (ref 0.5–1.05)
EGFRCR SERPLBLD CKD-EPI 2021: 84 ML/MIN/1.73M*2
GLUCOSE SERPL-MCNC: 73 MG/DL (ref 74–99)
POTASSIUM SERPL-SCNC: 4.1 MMOL/L (ref 3.5–5.3)
SODIUM SERPL-SCNC: 141 MMOL/L (ref 136–145)

## 2025-02-24 PROCEDURE — 80048 BASIC METABOLIC PNL TOTAL CA: CPT | Mod: OUT | Performed by: INTERNAL MEDICINE

## 2025-02-24 PROCEDURE — 36415 COLL VENOUS BLD VENIPUNCTURE: CPT | Mod: OUT | Performed by: INTERNAL MEDICINE

## 2025-03-19 ENCOUNTER — LAB REQUISITION (OUTPATIENT)
Dept: LAB | Facility: HOSPITAL | Age: 89
End: 2025-03-19
Payer: MEDICARE

## 2025-03-19 DIAGNOSIS — N39.0 URINARY TRACT INFECTION, SITE NOT SPECIFIED: ICD-10-CM

## 2025-03-19 LAB
AMORPH CRY #/AREA UR COMP ASSIST: ABNORMAL /HPF
APPEARANCE UR: CLEAR
BACTERIA #/AREA URNS AUTO: ABNORMAL /HPF
BILIRUB UR STRIP.AUTO-MCNC: NEGATIVE MG/DL
COLOR UR: ABNORMAL
GLUCOSE UR STRIP.AUTO-MCNC: NORMAL MG/DL
KETONES UR STRIP.AUTO-MCNC: NEGATIVE MG/DL
LEUKOCYTE ESTERASE UR QL STRIP.AUTO: ABNORMAL
MUCOUS THREADS #/AREA URNS AUTO: ABNORMAL /LPF
NITRITE UR QL STRIP.AUTO: NEGATIVE
PH UR STRIP.AUTO: 6.5 [PH]
PROT UR STRIP.AUTO-MCNC: NEGATIVE MG/DL
RBC # UR STRIP.AUTO: NEGATIVE MG/DL
RBC #/AREA URNS AUTO: ABNORMAL /HPF
SP GR UR STRIP.AUTO: 1.02
SQUAMOUS #/AREA URNS AUTO: ABNORMAL /HPF
UROBILINOGEN UR STRIP.AUTO-MCNC: NORMAL MG/DL
WBC #/AREA URNS AUTO: ABNORMAL /HPF

## 2025-03-19 PROCEDURE — 87086 URINE CULTURE/COLONY COUNT: CPT | Mod: OUT,PORLAB | Performed by: INTERNAL MEDICINE

## 2025-03-19 PROCEDURE — 81001 URINALYSIS AUTO W/SCOPE: CPT | Mod: OUT | Performed by: INTERNAL MEDICINE

## 2025-03-20 LAB — BACTERIA UR CULT: NORMAL

## 2025-03-21 ENCOUNTER — LAB REQUISITION (OUTPATIENT)
Dept: LAB | Facility: HOSPITAL | Age: 89
End: 2025-03-21
Payer: MEDICARE

## 2025-03-21 DIAGNOSIS — N39.0 URINARY TRACT INFECTION, SITE NOT SPECIFIED: ICD-10-CM

## 2025-03-21 LAB
APPEARANCE UR: CLEAR
BILIRUB UR STRIP.AUTO-MCNC: NEGATIVE MG/DL
COLOR UR: ABNORMAL
GLUCOSE UR STRIP.AUTO-MCNC: NORMAL MG/DL
KETONES UR STRIP.AUTO-MCNC: NEGATIVE MG/DL
LEUKOCYTE ESTERASE UR QL STRIP.AUTO: ABNORMAL
MUCOUS THREADS #/AREA URNS AUTO: ABNORMAL /LPF
NITRITE UR QL STRIP.AUTO: NEGATIVE
PH UR STRIP.AUTO: 6.5 [PH]
PROT UR STRIP.AUTO-MCNC: ABNORMAL MG/DL
RBC # UR STRIP.AUTO: ABNORMAL MG/DL
RBC #/AREA URNS AUTO: ABNORMAL /HPF
SP GR UR STRIP.AUTO: 1.03
SQUAMOUS #/AREA URNS AUTO: ABNORMAL /HPF
UROBILINOGEN UR STRIP.AUTO-MCNC: NORMAL MG/DL
WBC #/AREA URNS AUTO: ABNORMAL /HPF

## 2025-03-21 PROCEDURE — 87086 URINE CULTURE/COLONY COUNT: CPT | Mod: OUT,PORLAB | Performed by: INTERNAL MEDICINE

## 2025-03-21 PROCEDURE — 81001 URINALYSIS AUTO W/SCOPE: CPT | Mod: OUT | Performed by: INTERNAL MEDICINE

## 2025-03-21 PROCEDURE — 3000000012 STAT CHARGE BILL (LAB USE ONLY): Mod: OUT | Performed by: INTERNAL MEDICINE

## 2025-03-23 LAB — BACTERIA UR CULT: NORMAL

## 2025-04-08 ENCOUNTER — APPOINTMENT (OUTPATIENT)
Dept: PRIMARY CARE | Facility: CLINIC | Age: 89
End: 2025-04-08
Payer: MEDICARE

## 2025-05-20 ENCOUNTER — LAB REQUISITION (OUTPATIENT)
Dept: LAB | Facility: HOSPITAL | Age: 89
End: 2025-05-20
Payer: MEDICARE

## 2025-05-20 DIAGNOSIS — R79.9 ABNORMAL FINDING OF BLOOD CHEMISTRY, UNSPECIFIED: ICD-10-CM

## 2025-05-20 DIAGNOSIS — R79.1 ABNORMAL COAGULATION PROFILE: ICD-10-CM

## 2025-05-20 LAB
APTT PPP: 30 SECONDS (ref 26–36)
ERYTHROCYTE [DISTWIDTH] IN BLOOD BY AUTOMATED COUNT: 13.3 % (ref 11.5–14.5)
HCT VFR BLD AUTO: 37.3 % (ref 36–46)
HGB BLD-MCNC: 12.8 G/DL (ref 12–16)
INR PPP: 1.1 (ref 0.9–1.1)
MCH RBC QN AUTO: 32.2 PG (ref 26–34)
MCHC RBC AUTO-ENTMCNC: 34.3 G/DL (ref 32–36)
MCV RBC AUTO: 94 FL (ref 80–100)
NRBC BLD-RTO: 0 /100 WBCS (ref 0–0)
PLATELET # BLD AUTO: 197 X10*3/UL (ref 150–450)
PROTHROMBIN TIME: 11.7 SECONDS (ref 9.8–12.4)
RBC # BLD AUTO: 3.98 X10*6/UL (ref 4–5.2)
WBC # BLD AUTO: 8.3 X10*3/UL (ref 4.4–11.3)

## 2025-05-20 PROCEDURE — 85027 COMPLETE CBC AUTOMATED: CPT | Mod: OUT | Performed by: INTERNAL MEDICINE

## 2025-05-20 PROCEDURE — 85610 PROTHROMBIN TIME: CPT | Mod: OUT | Performed by: INTERNAL MEDICINE

## 2025-05-27 ENCOUNTER — LAB REQUISITION (OUTPATIENT)
Dept: LAB | Facility: HOSPITAL | Age: 89
End: 2025-05-27
Payer: MEDICARE

## 2025-05-27 DIAGNOSIS — R94.6 ABNORMAL RESULTS OF THYROID FUNCTION STUDIES: ICD-10-CM

## 2025-05-27 LAB
HOLD SPECIMEN: NORMAL
TSH SERPL-ACNC: 20.69 MIU/L (ref 0.44–3.98)

## 2025-05-27 PROCEDURE — 36415 COLL VENOUS BLD VENIPUNCTURE: CPT | Mod: OUT | Performed by: INTERNAL MEDICINE

## 2025-05-27 PROCEDURE — 84443 ASSAY THYROID STIM HORMONE: CPT | Mod: OUT | Performed by: INTERNAL MEDICINE

## 2025-06-09 ENCOUNTER — LAB REQUISITION (OUTPATIENT)
Dept: LAB | Facility: HOSPITAL | Age: 89
End: 2025-06-09
Payer: MEDICARE

## 2025-06-09 DIAGNOSIS — I10 ESSENTIAL (PRIMARY) HYPERTENSION: ICD-10-CM

## 2025-06-09 LAB
ALBUMIN SERPL BCP-MCNC: 3.6 G/DL (ref 3.4–5)
ALP SERPL-CCNC: 95 U/L (ref 33–136)
ALT SERPL W P-5'-P-CCNC: 17 U/L (ref 7–45)
ANION GAP SERPL CALC-SCNC: 17 MMOL/L (ref 10–20)
AST SERPL W P-5'-P-CCNC: 21 U/L (ref 9–39)
BASOPHILS # BLD AUTO: 0.03 X10*3/UL (ref 0–0.1)
BASOPHILS NFR BLD AUTO: 0.3 %
BILIRUB SERPL-MCNC: 0.6 MG/DL (ref 0–1.2)
BUN SERPL-MCNC: 23 MG/DL (ref 6–23)
CALCIUM SERPL-MCNC: 9 MG/DL (ref 8.6–10.3)
CHLORIDE SERPL-SCNC: 102 MMOL/L (ref 98–107)
CO2 SERPL-SCNC: 24 MMOL/L (ref 21–32)
CREAT SERPL-MCNC: 0.66 MG/DL (ref 0.5–1.05)
EGFRCR SERPLBLD CKD-EPI 2021: 85 ML/MIN/1.73M*2
EOSINOPHIL # BLD AUTO: 0.08 X10*3/UL (ref 0–0.4)
EOSINOPHIL NFR BLD AUTO: 0.7 %
ERYTHROCYTE [DISTWIDTH] IN BLOOD BY AUTOMATED COUNT: 13.7 % (ref 11.5–14.5)
GLUCOSE SERPL-MCNC: 96 MG/DL (ref 74–99)
HCT VFR BLD AUTO: 37.8 % (ref 36–46)
HGB BLD-MCNC: 12.6 G/DL (ref 12–16)
IMM GRANULOCYTES # BLD AUTO: 0.04 X10*3/UL (ref 0–0.5)
IMM GRANULOCYTES NFR BLD AUTO: 0.4 % (ref 0–0.9)
LYMPHOCYTES # BLD AUTO: 1.16 X10*3/UL (ref 0.8–3)
LYMPHOCYTES NFR BLD AUTO: 10.4 %
MCH RBC QN AUTO: 32.2 PG (ref 26–34)
MCHC RBC AUTO-ENTMCNC: 33.3 G/DL (ref 32–36)
MCV RBC AUTO: 97 FL (ref 80–100)
MONOCYTES # BLD AUTO: 1.42 X10*3/UL (ref 0.05–0.8)
MONOCYTES NFR BLD AUTO: 12.7 %
NEUTROPHILS # BLD AUTO: 8.42 X10*3/UL (ref 1.6–5.5)
NEUTROPHILS NFR BLD AUTO: 75.5 %
NRBC BLD-RTO: 0 /100 WBCS (ref 0–0)
PLATELET # BLD AUTO: 225 X10*3/UL (ref 150–450)
POTASSIUM SERPL-SCNC: 3.8 MMOL/L (ref 3.5–5.3)
PROT SERPL-MCNC: 6.3 G/DL (ref 6.4–8.2)
RBC # BLD AUTO: 3.91 X10*6/UL (ref 4–5.2)
SODIUM SERPL-SCNC: 139 MMOL/L (ref 136–145)
TSH SERPL-ACNC: 19.39 MIU/L (ref 0.44–3.98)
WBC # BLD AUTO: 11.2 X10*3/UL (ref 4.4–11.3)

## 2025-06-09 PROCEDURE — 80053 COMPREHEN METABOLIC PANEL: CPT | Mod: OUT | Performed by: INTERNAL MEDICINE

## 2025-06-09 PROCEDURE — 85025 COMPLETE CBC W/AUTO DIFF WBC: CPT | Mod: OUT | Performed by: INTERNAL MEDICINE

## 2025-06-09 PROCEDURE — 84443 ASSAY THYROID STIM HORMONE: CPT | Mod: OUT | Performed by: INTERNAL MEDICINE

## 2025-07-05 ENCOUNTER — LAB REQUISITION (OUTPATIENT)
Dept: LAB | Facility: HOSPITAL | Age: 89
End: 2025-07-05
Payer: MEDICARE

## 2025-07-05 DIAGNOSIS — N39.0 URINARY TRACT INFECTION, SITE NOT SPECIFIED: ICD-10-CM

## 2025-07-05 LAB
APPEARANCE UR: CLEAR
BILIRUB UR STRIP.AUTO-MCNC: NEGATIVE MG/DL
COLOR UR: YELLOW
GLUCOSE UR STRIP.AUTO-MCNC: NORMAL MG/DL
KETONES UR STRIP.AUTO-MCNC: NEGATIVE MG/DL
LEUKOCYTE ESTERASE UR QL STRIP.AUTO: NEGATIVE
NITRITE UR QL STRIP.AUTO: NEGATIVE
PH UR STRIP.AUTO: 5.5 [PH]
PROT UR STRIP.AUTO-MCNC: NEGATIVE MG/DL
RBC # UR STRIP.AUTO: NEGATIVE MG/DL
SP GR UR STRIP.AUTO: 1.03
UROBILINOGEN UR STRIP.AUTO-MCNC: NORMAL MG/DL

## 2025-07-05 PROCEDURE — 87086 URINE CULTURE/COLONY COUNT: CPT | Mod: OUT,PORLAB | Performed by: INTERNAL MEDICINE

## 2025-07-05 PROCEDURE — 81003 URINALYSIS AUTO W/O SCOPE: CPT | Mod: OUT | Performed by: INTERNAL MEDICINE

## 2025-07-06 LAB — BACTERIA UR CULT: NO GROWTH

## 2025-07-17 ENCOUNTER — LAB REQUISITION (OUTPATIENT)
Dept: LAB | Facility: HOSPITAL | Age: 89
End: 2025-07-17
Payer: MEDICARE

## 2025-07-17 DIAGNOSIS — E55.9 VITAMIN D DEFICIENCY, UNSPECIFIED: ICD-10-CM

## 2025-07-17 DIAGNOSIS — Z13.29 ENCOUNTER FOR SCREENING FOR OTHER SUSPECTED ENDOCRINE DISORDER: ICD-10-CM

## 2025-07-17 LAB
25(OH)D3 SERPL-MCNC: 75 NG/ML (ref 30–100)
TSH SERPL-ACNC: 20.58 MIU/L (ref 0.44–3.98)

## 2025-07-17 PROCEDURE — 82306 VITAMIN D 25 HYDROXY: CPT | Mod: OUT,PORLAB | Performed by: INTERNAL MEDICINE

## 2025-07-17 PROCEDURE — 84443 ASSAY THYROID STIM HORMONE: CPT | Mod: OUT | Performed by: INTERNAL MEDICINE

## 2025-07-17 PROCEDURE — 36415 COLL VENOUS BLD VENIPUNCTURE: CPT | Mod: OUT | Performed by: INTERNAL MEDICINE

## 2025-07-26 ENCOUNTER — HOSPITAL ENCOUNTER (EMERGENCY)
Facility: HOSPITAL | Age: 89
Discharge: NURSING, REHAB OR LONG-TERM CARE | End: 2025-07-26
Attending: EMERGENCY MEDICINE
Payer: MEDICARE

## 2025-07-26 ENCOUNTER — APPOINTMENT (OUTPATIENT)
Dept: RADIOLOGY | Facility: HOSPITAL | Age: 89
End: 2025-07-26
Payer: MEDICARE

## 2025-07-26 VITALS
HEIGHT: 65 IN | RESPIRATION RATE: 16 BRPM | WEIGHT: 119.8 LBS | TEMPERATURE: 98 F | OXYGEN SATURATION: 98 % | HEART RATE: 76 BPM | DIASTOLIC BLOOD PRESSURE: 86 MMHG | SYSTOLIC BLOOD PRESSURE: 138 MMHG | BODY MASS INDEX: 19.96 KG/M2

## 2025-07-26 DIAGNOSIS — R11.11 VOMITING WITHOUT NAUSEA, UNSPECIFIED VOMITING TYPE: Primary | ICD-10-CM

## 2025-07-26 LAB
ALBUMIN SERPL BCP-MCNC: 3.9 G/DL (ref 3.4–5)
ALP SERPL-CCNC: 82 U/L (ref 33–136)
ALT SERPL W P-5'-P-CCNC: 26 U/L (ref 7–45)
ANION GAP SERPL CALC-SCNC: 13 MMOL/L (ref 10–20)
AST SERPL W P-5'-P-CCNC: 27 U/L (ref 9–39)
BASOPHILS # BLD AUTO: 0.03 X10*3/UL (ref 0–0.1)
BASOPHILS NFR BLD AUTO: 0.5 %
BILIRUB SERPL-MCNC: 0.5 MG/DL (ref 0–1.2)
BUN SERPL-MCNC: 29 MG/DL (ref 6–23)
CALCIUM SERPL-MCNC: 9.2 MG/DL (ref 8.6–10.3)
CHLORIDE SERPL-SCNC: 105 MMOL/L (ref 98–107)
CO2 SERPL-SCNC: 23 MMOL/L (ref 21–32)
CREAT SERPL-MCNC: 0.77 MG/DL (ref 0.5–1.05)
EGFRCR SERPLBLD CKD-EPI 2021: 74 ML/MIN/1.73M*2
EOSINOPHIL # BLD AUTO: 0.09 X10*3/UL (ref 0–0.4)
EOSINOPHIL NFR BLD AUTO: 1.4 %
ERYTHROCYTE [DISTWIDTH] IN BLOOD BY AUTOMATED COUNT: 14.3 % (ref 11.5–14.5)
GLUCOSE SERPL-MCNC: 87 MG/DL (ref 74–99)
HCT VFR BLD AUTO: 39.9 % (ref 36–46)
HGB BLD-MCNC: 13.5 G/DL (ref 12–16)
IMM GRANULOCYTES # BLD AUTO: 0.03 X10*3/UL (ref 0–0.5)
IMM GRANULOCYTES NFR BLD AUTO: 0.5 % (ref 0–0.9)
LYMPHOCYTES # BLD AUTO: 1.16 X10*3/UL (ref 0.8–3)
LYMPHOCYTES NFR BLD AUTO: 17.9 %
MCH RBC QN AUTO: 32.1 PG (ref 26–34)
MCHC RBC AUTO-ENTMCNC: 33.8 G/DL (ref 32–36)
MCV RBC AUTO: 95 FL (ref 80–100)
MONOCYTES # BLD AUTO: 0.43 X10*3/UL (ref 0.05–0.8)
MONOCYTES NFR BLD AUTO: 6.6 %
NEUTROPHILS # BLD AUTO: 4.75 X10*3/UL (ref 1.6–5.5)
NEUTROPHILS NFR BLD AUTO: 73.1 %
NRBC BLD-RTO: 0 /100 WBCS (ref 0–0)
PLATELET # BLD AUTO: 244 X10*3/UL (ref 150–450)
POTASSIUM SERPL-SCNC: 4.1 MMOL/L (ref 3.5–5.3)
PROT SERPL-MCNC: 7.4 G/DL (ref 6.4–8.2)
RBC # BLD AUTO: 4.21 X10*6/UL (ref 4–5.2)
SODIUM SERPL-SCNC: 137 MMOL/L (ref 136–145)
WBC # BLD AUTO: 6.5 X10*3/UL (ref 4.4–11.3)

## 2025-07-26 PROCEDURE — 99284 EMERGENCY DEPT VISIT MOD MDM: CPT | Mod: 25 | Performed by: EMERGENCY MEDICINE

## 2025-07-26 PROCEDURE — 85025 COMPLETE CBC W/AUTO DIFF WBC: CPT | Performed by: EMERGENCY MEDICINE

## 2025-07-26 PROCEDURE — 36415 COLL VENOUS BLD VENIPUNCTURE: CPT | Performed by: EMERGENCY MEDICINE

## 2025-07-26 PROCEDURE — 71045 X-RAY EXAM CHEST 1 VIEW: CPT | Performed by: RADIOLOGY

## 2025-07-26 PROCEDURE — 71045 X-RAY EXAM CHEST 1 VIEW: CPT

## 2025-07-26 PROCEDURE — 84075 ASSAY ALKALINE PHOSPHATASE: CPT | Performed by: EMERGENCY MEDICINE

## 2025-07-26 ASSESSMENT — PAIN SCALES - GENERAL: PAINLEVEL_OUTOF10: 0 - NO PAIN

## 2025-07-26 ASSESSMENT — PAIN - FUNCTIONAL ASSESSMENT: PAIN_FUNCTIONAL_ASSESSMENT: 0-10

## 2025-07-26 NOTE — ED PROVIDER NOTES
HPI   Chief Complaint   Patient presents with    concern for aspiration       Patient presents to the emergency department secondary to concern for possible aspiration.  Patient had vomiting at her skilled nursing facility earlier today.  Paramedics were called because of a low oxygen saturation.  It appears written on her paperwork that her O2 saturation was 88%.  During transport and while here her oxygen saturation is in the normal range.  She is 9596%.  Patient is baseline confused.  She is alert and oriented to person only.  She has no complaints.  She denies pain.  No shortness of breath.  No abdominal pain.  No nausea or vomiting currently.  No other complaints at this time.                          [place] Coma Scale Score: 14                  Patient History   Medical History[1]  Surgical History[2]  Family History[3]  Social History[4]    Physical Exam   ED Triage Vitals [07/26/25 1619]   Temperature Heart Rate Respirations BP   36.7 °C (98 °F) 65 18 164/88      Pulse Ox Temp Source Heart Rate Source Patient Position   98 % Tympanic Monitor --      BP Location FiO2 (%)     -- --       Physical Exam  Vitals and nursing note reviewed.   Constitutional:       Appearance: Normal appearance.   HENT:      Head: Normocephalic and atraumatic.      Nose: Nose normal.      Mouth/Throat:      Mouth: Mucous membranes are moist.     Eyes:      Extraocular Movements: Extraocular movements intact.      Pupils: Pupils are equal, round, and reactive to light.       Cardiovascular:      Rate and Rhythm: Normal rate and regular rhythm.      Pulses: Normal pulses.      Heart sounds: Normal heart sounds.   Pulmonary:      Effort: Pulmonary effort is normal.      Breath sounds: Normal breath sounds.   Abdominal:      General: Abdomen is flat. Bowel sounds are normal.      Palpations: Abdomen is soft.      Tenderness: There is no abdominal tenderness. There is no guarding or rebound.     Musculoskeletal:         General: Normal  range of motion.      Cervical back: Normal range of motion.      Right lower leg: No edema.      Left lower leg: No edema.     Skin:     General: Skin is warm and dry.      Capillary Refill: Capillary refill takes less than 2 seconds.     Neurological:      General: No focal deficit present.      Mental Status: She is alert. Mental status is at baseline. She is disoriented.     Psychiatric:         Mood and Affect: Mood normal.         Behavior: Behavior normal.       Labs Reviewed   CBC WITH AUTO DIFFERENTIAL   COMPREHENSIVE METABOLIC PANEL     Pain Management Panel          Latest Ref Rng & Units 5/7/2018   Pain Management Panel   Amphetamine Screen, Urine NEGATIVE NEGATIVE    Barbiturate Screen, Urine NEGATIVE NEGATIVE    Codeine ng/mL <20    Hydromorphone Urine ng/mL <20    Methadone Screen, Urine NEGATIVE NEGATIVE    Morphine  ng/mL <20      XR chest 1 view    (Results Pending)     ED Course & MDM   Diagnoses as of 07/26/25 1957   Vomiting without nausea, unspecified vomiting type       Medical Decision Making  Patient presents to the emergency department secondary to concern for possible aspiration.  Patient's vital signs are stable at this time.  She is 95 to 96% on room air without hypoxia.  Patient is evaluated using chest x-ray CBC and CMP.  Patient's chest x-ray shows no acute disease.  Laboratory workup shows a mildly elevated BUN of 29.  No acute electrolyte abnormality.  No leukocytosis.  Patient has been able to maintain her oxygen saturations throughout her stay in the emergency department.  At this time she does not meet admission criteria or criteria for further testing in the emergency department.  Patient is hemodynamically stable.  She can be discharged back to her skilled nursing facility.        Procedure  Procedures       [1]   Past Medical History:  Diagnosis Date    Other specified postprocedural states     History of colonoscopy    Personal history of other diseases of the digestive  system 04/27/2016    History of constipation   [2]   Past Surgical History:  Procedure Laterality Date    COLONOSCOPY  04/27/2016    Complete Colonoscopy    HERNIA REPAIR  04/27/2016    Hernia Repair   [3] No family history on file.  [4]   Social History  Tobacco Use    Smoking status: Never    Smokeless tobacco: Never   Vaping Use    Vaping status: Never Used   Substance Use Topics    Alcohol use: Never    Drug use: Never        Luana Mcclure MD  07/26/25 1958

## 2025-07-26 NOTE — ED TRIAGE NOTES
Pt to ED via EMS c/o concern for aspiration. EMS [facility] sent pt in because she was vomiting and they are concerned she aspirated. Pt alert to self, confused to place, time and event. Pt NSR on arrival, 98% on RA, afebrile, no respiratory distress. Pt has history of alzheimer's and dementia.

## 2025-08-26 ENCOUNTER — APPOINTMENT (OUTPATIENT)
Dept: RADIOLOGY | Facility: HOSPITAL | Age: 89
End: 2025-08-26
Payer: MEDICARE

## 2025-08-26 ENCOUNTER — HOSPITAL ENCOUNTER (EMERGENCY)
Facility: HOSPITAL | Age: 89
Discharge: HOME | End: 2025-08-26
Payer: MEDICARE

## 2025-08-26 VITALS
TEMPERATURE: 99.7 F | RESPIRATION RATE: 18 BRPM | SYSTOLIC BLOOD PRESSURE: 109 MMHG | HEART RATE: 95 BPM | OXYGEN SATURATION: 98 % | DIASTOLIC BLOOD PRESSURE: 66 MMHG

## 2025-08-26 DIAGNOSIS — S09.90XA HEAD INJURY, INITIAL ENCOUNTER: ICD-10-CM
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
## 2025-08-26 PROCEDURE — 70450 CT HEAD/BRAIN W/O DYE: CPT

## 2025-08-26 PROCEDURE — 70450 CT HEAD/BRAIN W/O DYE: CPT | Performed by: STUDENT IN AN ORGANIZED HEALTH CARE EDUCATION/TRAINING PROGRAM

## 2025-08-26 PROCEDURE — 70486 CT MAXILLOFACIAL W/O DYE: CPT

## 2025-08-26 PROCEDURE — 76377 3D RENDER W/INTRP POSTPROCES: CPT

## 2025-08-26 PROCEDURE — 99284 EMERGENCY DEPT VISIT MOD MDM: CPT | Mod: 25

## 2025-08-26 PROCEDURE — 70486 CT MAXILLOFACIAL W/O DYE: CPT | Performed by: STUDENT IN AN ORGANIZED HEALTH CARE EDUCATION/TRAINING PROGRAM

## 2025-08-26 PROCEDURE — 72125 CT NECK SPINE W/O DYE: CPT

## 2025-08-26 PROCEDURE — 72125 CT NECK SPINE W/O DYE: CPT | Performed by: STUDENT IN AN ORGANIZED HEALTH CARE EDUCATION/TRAINING PROGRAM

## 2025-08-26 PROCEDURE — 76377 3D RENDER W/INTRP POSTPROCES: CPT | Performed by: STUDENT IN AN ORGANIZED HEALTH CARE EDUCATION/TRAINING PROGRAM

## 2025-08-26 ASSESSMENT — PAIN - FUNCTIONAL ASSESSMENT: PAIN_FUNCTIONAL_ASSESSMENT: 0-10

## 2025-08-26 ASSESSMENT — PAIN SCALES - GENERAL: PAINLEVEL_OUTOF10: 0 - NO PAIN

## 2025-09-02 ENCOUNTER — LAB REQUISITION (OUTPATIENT)
Dept: LAB | Facility: HOSPITAL | Age: 89
End: 2025-09-02
Payer: MEDICARE

## 2025-09-02 DIAGNOSIS — E03.9 HYPOTHYROIDISM, UNSPECIFIED: ICD-10-CM

## 2025-09-02 LAB — TSH SERPL-ACNC: 17.23 MIU/L (ref 0.44–3.98)

## 2025-09-02 PROCEDURE — 84443 ASSAY THYROID STIM HORMONE: CPT | Mod: OUT | Performed by: INTERNAL MEDICINE

## 2025-09-02 PROCEDURE — 36415 COLL VENOUS BLD VENIPUNCTURE: CPT | Mod: OUT | Performed by: INTERNAL MEDICINE

## (undated) DEVICE — SUTURE, ETHILON, 4-0, BLK, MONO, PS-2 18

## (undated) DEVICE — TOWEL PACK, STERILE, 4/PACK, BLUE

## (undated) DEVICE — GLOVE, PROTEXIS PI CLASSIC, SZ-8.0, PF, PF, LF

## (undated) DEVICE — FRAZIER SUCTION, 10 FR.

## (undated) DEVICE — SPLINT, FAST SETTING, 4 X 15 IN, PLASTER

## (undated) DEVICE — TUBING, NON COND, 6MM X 20

## (undated) DEVICE — Device

## (undated) DEVICE — DRESSING, GAUZE, SPONGE, VERSALON, ALL PURPOSE, 4 X 4 IN, SOFT

## (undated) DEVICE — DRESSING, GAUZE, PETROLATUM, STRIP, XEROFORM, 1 X 8 IN, STERILE

## (undated) DEVICE — CUFF, TOURNIQUET, 18 X 4, DUAL PORT/SNGL BLADDER, DISP, LF

## (undated) DEVICE — DRAPE, C-ARM, FLUROSCAN, MINI

## (undated) DEVICE — APPLICATOR, CHLORAPREP, W/ORANGE TINT, 26ML

## (undated) DEVICE — PAD, GROUNDING, ELECTROSURGICAL, W/9 FT CABLE, POLYHESIVE II, ADULT, LF

## (undated) DEVICE — CAUTERY, PENCIL, PUSH BUTTON, SMOKE EVAC, 70MM

## (undated) DEVICE — BANDAGE, ELASTIC, PREMIUM, SELF-CLOSE, 3 IN X 5 YD, STERILE

## (undated) DEVICE — KIT, UPPER EXTREMITY, CUSTOM, PORTAGE

## (undated) DEVICE — BIT, DRILL, QUICK-CONNECT, 2 X 125 MM, STAINLESS STEEL

## (undated) DEVICE — DRILL BIT, 1.8MM W/DEPTH MARKER/QC/110MM

## (undated) DEVICE — SOLUTION, IRRIGATION, SODIUM CHLORIDE 0.9%, 1000 ML, POUR BOTTLE

## (undated) DEVICE — COVER HANDLE LIGHT, STERIS, BLUE, STERILE

## (undated) DEVICE — SLING, ARM, CRADLE, DEMIN, W/PAD, MEDIUM

## (undated) DEVICE — PADDING, CAST, SOFT, 3 X 4YDS

## (undated) DEVICE — SUTURE, VICRYL, 4-0, 18 IN, UNDYED BR PS-2